# Patient Record
Sex: FEMALE | Race: WHITE | NOT HISPANIC OR LATINO | Employment: OTHER | URBAN - METROPOLITAN AREA
[De-identification: names, ages, dates, MRNs, and addresses within clinical notes are randomized per-mention and may not be internally consistent; named-entity substitution may affect disease eponyms.]

---

## 2017-03-05 ENCOUNTER — APPOINTMENT (OUTPATIENT)
Dept: CT IMAGING | Facility: HOSPITAL | Age: 77
End: 2017-03-05

## 2017-03-05 ENCOUNTER — HOSPITAL ENCOUNTER (EMERGENCY)
Facility: HOSPITAL | Age: 77
Discharge: HOME | End: 2017-03-06
Attending: EMERGENCY MEDICINE | Admitting: EMERGENCY MEDICINE

## 2017-03-05 ENCOUNTER — APPOINTMENT (OUTPATIENT)
Dept: GENERAL RADIOLOGY | Facility: HOSPITAL | Age: 77
End: 2017-03-05

## 2017-03-05 DIAGNOSIS — R41.82 ALTERED MENTAL STATUS, UNSPECIFIED: Primary | ICD-10-CM

## 2017-03-05 DIAGNOSIS — R53.83 FATIGUE, UNSPECIFIED TYPE: ICD-10-CM

## 2017-03-05 LAB
ALT SERPL W P-5'-P-CCNC: 41 U/L (ref 7–40)
APTT PPP: <24 SECONDS (ref 24–31)
AST SERPL-CCNC: 39 U/L (ref 0–33)
BASOPHILS # BLD AUTO: 0.03 10*3/MM3 (ref 0–0.2)
BASOPHILS NFR BLD AUTO: 0.5 % (ref 0–1)
BUN BLDA-MCNC: 21 MG/DL (ref 8–26)
CA-I BLDA-SCNC: 1.33 MMOL/L (ref 1.2–1.32)
CHLORIDE BLDA-SCNC: 103 MMOL/L (ref 98–109)
CO2 BLDA-SCNC: 28 MMOL/L (ref 24–29)
CREAT BLDA-MCNC: 0.9 MG/DL (ref 0.6–1.3)
D-LACTATE SERPL-SCNC: 0.6 MMOL/L (ref 0.5–2)
DEPRECATED RDW RBC AUTO: 46.5 FL (ref 37–54)
EOSINOPHIL # BLD AUTO: 0.11 10*3/MM3 (ref 0.1–0.3)
EOSINOPHIL NFR BLD AUTO: 1.7 % (ref 0–3)
ERYTHROCYTE [DISTWIDTH] IN BLOOD BY AUTOMATED COUNT: 13.8 % (ref 11.3–14.5)
GLUCOSE BLDC GLUCOMTR-MCNC: 107 MG/DL (ref 70–130)
HCT VFR BLD AUTO: 38.4 % (ref 34.5–44)
HCT VFR BLDA CALC: 38 % (ref 38–51)
HGB BLD-MCNC: 12.6 G/DL (ref 11.5–15.5)
HGB BLDA-MCNC: 12.9 G/DL (ref 12–17)
IMM GRANULOCYTES # BLD: 0.03 10*3/MM3 (ref 0–0.03)
IMM GRANULOCYTES NFR BLD: 0.5 % (ref 0–0.6)
INR PPP: 1 (ref 0.8–1.2)
LYMPHOCYTES # BLD AUTO: 1.87 10*3/MM3 (ref 0.6–4.8)
LYMPHOCYTES NFR BLD AUTO: 28.8 % (ref 24–44)
MCH RBC QN AUTO: 30.2 PG (ref 27–31)
MCHC RBC AUTO-ENTMCNC: 32.8 G/DL (ref 32–36)
MCV RBC AUTO: 92.1 FL (ref 80–99)
MONOCYTES # BLD AUTO: 0.38 10*3/MM3 (ref 0–1)
MONOCYTES NFR BLD AUTO: 5.9 % (ref 0–12)
NEUTROPHILS # BLD AUTO: 4.07 10*3/MM3 (ref 1.5–8.3)
NEUTROPHILS NFR BLD AUTO: 62.6 % (ref 41–71)
PLATELET # BLD AUTO: 56 10*3/MM3 (ref 150–450)
PMV BLD AUTO: 12.6 FL (ref 6–12)
POTASSIUM BLDA-SCNC: 3.9 MMOL/L (ref 3.5–4.9)
PROTHROMBIN TIME: 12.2 SECONDS (ref 12.8–15.2)
RBC # BLD AUTO: 4.17 10*6/MM3 (ref 3.89–5.14)
SODIUM BLDA-SCNC: 141 MMOL/L (ref 138–146)
TROPONIN I SERPL-MCNC: 0.01 NG/ML (ref 0–0.07)
WBC NRBC COR # BLD: 6.49 10*3/MM3 (ref 3.5–10.8)

## 2017-03-05 PROCEDURE — 70450 CT HEAD/BRAIN W/O DYE: CPT

## 2017-03-05 PROCEDURE — 83605 ASSAY OF LACTIC ACID: CPT | Performed by: EMERGENCY MEDICINE

## 2017-03-05 PROCEDURE — 85610 PROTHROMBIN TIME: CPT

## 2017-03-05 PROCEDURE — 99285 EMERGENCY DEPT VISIT HI MDM: CPT

## 2017-03-05 PROCEDURE — 84450 TRANSFERASE (AST) (SGOT): CPT | Performed by: EMERGENCY MEDICINE

## 2017-03-05 PROCEDURE — 71010 HC CHEST PA OR AP: CPT

## 2017-03-05 PROCEDURE — 0042T HC CT CEREBRAL PERFUSION W/WO CONTRAST: CPT

## 2017-03-05 PROCEDURE — 85014 HEMATOCRIT: CPT

## 2017-03-05 PROCEDURE — 80047 BASIC METABLC PNL IONIZED CA: CPT

## 2017-03-05 PROCEDURE — 85025 COMPLETE CBC W/AUTO DIFF WBC: CPT | Performed by: EMERGENCY MEDICINE

## 2017-03-05 PROCEDURE — 93005 ELECTROCARDIOGRAM TRACING: CPT | Performed by: EMERGENCY MEDICINE

## 2017-03-05 PROCEDURE — 85730 THROMBOPLASTIN TIME PARTIAL: CPT | Performed by: EMERGENCY MEDICINE

## 2017-03-05 PROCEDURE — 0 IOPAMIDOL PER 1 ML: Performed by: EMERGENCY MEDICINE

## 2017-03-05 PROCEDURE — 87040 BLOOD CULTURE FOR BACTERIA: CPT | Performed by: EMERGENCY MEDICINE

## 2017-03-05 PROCEDURE — 84460 ALANINE AMINO (ALT) (SGPT): CPT | Performed by: EMERGENCY MEDICINE

## 2017-03-05 PROCEDURE — 84484 ASSAY OF TROPONIN QUANT: CPT

## 2017-03-05 RX ORDER — SODIUM CHLORIDE 0.9 % (FLUSH) 0.9 %
10 SYRINGE (ML) INJECTION AS NEEDED
Status: DISCONTINUED | OUTPATIENT
Start: 2017-03-05 | End: 2017-03-06 | Stop reason: HOSPADM

## 2017-03-05 RX ADMIN — IOPAMIDOL 40 ML: 755 INJECTION, SOLUTION INTRAVENOUS at 22:26

## 2017-03-06 ENCOUNTER — APPOINTMENT (OUTPATIENT)
Dept: GENERAL RADIOLOGY | Facility: HOSPITAL | Age: 77
End: 2017-03-06

## 2017-03-06 ENCOUNTER — HOSPITAL ENCOUNTER (OUTPATIENT)
Facility: HOSPITAL | Age: 77
Setting detail: OBSERVATION
LOS: 1 days | Discharge: NURSING, REHAB OR LONG-TERM CARE | End: 2017-03-14
Attending: EMERGENCY MEDICINE | Admitting: INTERNAL MEDICINE

## 2017-03-06 ENCOUNTER — APPOINTMENT (OUTPATIENT)
Dept: MRI IMAGING | Facility: HOSPITAL | Age: 77
End: 2017-03-06

## 2017-03-06 VITALS
WEIGHT: 130 LBS | OXYGEN SATURATION: 95 % | DIASTOLIC BLOOD PRESSURE: 67 MMHG | TEMPERATURE: 98.5 F | RESPIRATION RATE: 16 BRPM | HEART RATE: 72 BPM | SYSTOLIC BLOOD PRESSURE: 150 MMHG | BODY MASS INDEX: 28.05 KG/M2 | HEIGHT: 57 IN

## 2017-03-06 DIAGNOSIS — Z78.9 IMPAIRED MOBILITY AND ADLS: ICD-10-CM

## 2017-03-06 DIAGNOSIS — R53.1 WEAKNESS: ICD-10-CM

## 2017-03-06 DIAGNOSIS — R62.7 FTT (FAILURE TO THRIVE) IN ADULT: ICD-10-CM

## 2017-03-06 DIAGNOSIS — R41.82 ALTERED MENTAL STATUS, UNSPECIFIED: Primary | ICD-10-CM

## 2017-03-06 DIAGNOSIS — Z74.09 IMPAIRED MOBILITY AND ADLS: ICD-10-CM

## 2017-03-06 DIAGNOSIS — Z74.09 IMPAIRED FUNCTIONAL MOBILITY, BALANCE, GAIT, AND ENDURANCE: ICD-10-CM

## 2017-03-06 LAB
ALBUMIN SERPL-MCNC: 4.4 G/DL (ref 3.2–4.8)
ALBUMIN/GLOB SERPL: 1.7 G/DL (ref 1.5–2.5)
ALP SERPL-CCNC: 76 U/L (ref 25–100)
ALT SERPL W P-5'-P-CCNC: 39 U/L (ref 7–40)
ANION GAP SERPL CALCULATED.3IONS-SCNC: 7 MMOL/L (ref 3–11)
AST SERPL-CCNC: 29 U/L (ref 0–33)
BACTERIA UR QL AUTO: ABNORMAL /HPF
BASOPHILS # BLD AUTO: 0.02 10*3/MM3 (ref 0–0.2)
BASOPHILS NFR BLD AUTO: 0.3 % (ref 0–1)
BILIRUB SERPL-MCNC: 0.2 MG/DL (ref 0.3–1.2)
BILIRUB UR QL STRIP: NEGATIVE
BUN BLD-MCNC: 16 MG/DL (ref 9–23)
BUN/CREAT SERPL: 22.9 (ref 7–25)
CALCIUM SPEC-SCNC: 10.9 MG/DL (ref 8.7–10.4)
CHLORIDE SERPL-SCNC: 104 MMOL/L (ref 99–109)
CLARITY UR: CLEAR
CO2 SERPL-SCNC: 28 MMOL/L (ref 20–31)
COLOR UR: YELLOW
CREAT BLD-MCNC: 0.7 MG/DL (ref 0.6–1.3)
DEPRECATED RDW RBC AUTO: 45.8 FL (ref 37–54)
EOSINOPHIL # BLD AUTO: 0.13 10*3/MM3 (ref 0.1–0.3)
EOSINOPHIL NFR BLD AUTO: 2.2 % (ref 0–3)
ERYTHROCYTE [DISTWIDTH] IN BLOOD BY AUTOMATED COUNT: 13.8 % (ref 11.3–14.5)
FLUAV AG NPH QL: NEGATIVE
FLUBV AG NPH QL IA: NEGATIVE
FOLATE SERPL-MCNC: 14.91 NG/ML (ref 3.2–20)
GFR SERPL CREATININE-BSD FRML MDRD: 81 ML/MIN/1.73
GLOBULIN UR ELPH-MCNC: 2.6 GM/DL
GLUCOSE BLD-MCNC: 100 MG/DL (ref 70–100)
GLUCOSE BLDC GLUCOMTR-MCNC: 106 MG/DL (ref 70–130)
GLUCOSE UR STRIP-MCNC: NEGATIVE MG/DL
HBA1C MFR BLD: 6.3 % (ref 4.8–5.6)
HCT VFR BLD AUTO: 36.6 % (ref 34.5–44)
HGB BLD-MCNC: 11.8 G/DL (ref 11.5–15.5)
HGB UR QL STRIP.AUTO: NEGATIVE
HOLD SPECIMEN: NORMAL
HYALINE CASTS UR QL AUTO: ABNORMAL /LPF
IMM GRANULOCYTES # BLD: 0.01 10*3/MM3 (ref 0–0.03)
IMM GRANULOCYTES NFR BLD: 0.2 % (ref 0–0.6)
KETONES UR QL STRIP: NEGATIVE
LEUKOCYTE ESTERASE UR QL STRIP.AUTO: NEGATIVE
LYMPHOCYTES # BLD AUTO: 1.81 10*3/MM3 (ref 0.6–4.8)
LYMPHOCYTES NFR BLD AUTO: 30.9 % (ref 24–44)
MAGNESIUM SERPL-MCNC: 2.1 MG/DL (ref 1.3–2.7)
MCH RBC QN AUTO: 29.4 PG (ref 27–31)
MCHC RBC AUTO-ENTMCNC: 32.2 G/DL (ref 32–36)
MCV RBC AUTO: 91.3 FL (ref 80–99)
MONOCYTES # BLD AUTO: 0.46 10*3/MM3 (ref 0–1)
MONOCYTES NFR BLD AUTO: 7.8 % (ref 0–12)
NEUTROPHILS # BLD AUTO: 3.43 10*3/MM3 (ref 1.5–8.3)
NEUTROPHILS NFR BLD AUTO: 58.6 % (ref 41–71)
NITRITE UR QL STRIP: NEGATIVE
PH UR STRIP.AUTO: 6 [PH] (ref 5–8)
PLATELET # BLD AUTO: 205 10*3/MM3 (ref 150–450)
PMV BLD AUTO: 11.5 FL (ref 6–12)
POTASSIUM BLD-SCNC: 3.8 MMOL/L (ref 3.5–5.5)
PROT SERPL-MCNC: 7 G/DL (ref 5.7–8.2)
PROT UR QL STRIP: ABNORMAL
RBC # BLD AUTO: 4.01 10*6/MM3 (ref 3.89–5.14)
RBC # UR: ABNORMAL /HPF
REF LAB TEST METHOD: ABNORMAL
SODIUM BLD-SCNC: 139 MMOL/L (ref 132–146)
SP GR UR STRIP: >=1.03 (ref 1–1.03)
SQUAMOUS #/AREA URNS HPF: ABNORMAL /HPF
TROPONIN I SERPL-MCNC: 0 NG/ML (ref 0–0.07)
TROPONIN I SERPL-MCNC: 0 NG/ML (ref 0–0.07)
TSH SERPL DL<=0.05 MIU/L-ACNC: 2.02 MIU/ML (ref 0.35–5.35)
UROBILINOGEN UR QL STRIP: ABNORMAL
VIT B12 BLD-MCNC: 585 PG/ML (ref 211–911)
WBC NRBC COR # BLD: 5.86 10*3/MM3 (ref 3.5–10.8)
WBC UR QL AUTO: ABNORMAL /HPF
WHOLE BLOOD HOLD SPECIMEN: NORMAL

## 2017-03-06 PROCEDURE — 81001 URINALYSIS AUTO W/SCOPE: CPT | Performed by: EMERGENCY MEDICINE

## 2017-03-06 PROCEDURE — 70553 MRI BRAIN STEM W/O & W/DYE: CPT

## 2017-03-06 PROCEDURE — 25010000002 HYDRALAZINE PER 20 MG: Performed by: NURSE PRACTITIONER

## 2017-03-06 PROCEDURE — 93005 ELECTROCARDIOGRAM TRACING: CPT | Performed by: EMERGENCY MEDICINE

## 2017-03-06 PROCEDURE — A9577 INJ MULTIHANCE: HCPCS | Performed by: EMERGENCY MEDICINE

## 2017-03-06 PROCEDURE — 87804 INFLUENZA ASSAY W/OPTIC: CPT | Performed by: EMERGENCY MEDICINE

## 2017-03-06 PROCEDURE — 82962 GLUCOSE BLOOD TEST: CPT

## 2017-03-06 PROCEDURE — 96376 TX/PRO/DX INJ SAME DRUG ADON: CPT

## 2017-03-06 PROCEDURE — 83036 HEMOGLOBIN GLYCOSYLATED A1C: CPT | Performed by: NURSE PRACTITIONER

## 2017-03-06 PROCEDURE — 99220 PR INITIAL OBSERVATION CARE/DAY 70 MINUTES: CPT | Performed by: INTERNAL MEDICINE

## 2017-03-06 PROCEDURE — 83735 ASSAY OF MAGNESIUM: CPT | Performed by: EMERGENCY MEDICINE

## 2017-03-06 PROCEDURE — 96374 THER/PROPH/DIAG INJ IV PUSH: CPT

## 2017-03-06 PROCEDURE — 96375 TX/PRO/DX INJ NEW DRUG ADDON: CPT

## 2017-03-06 PROCEDURE — 80053 COMPREHEN METABOLIC PANEL: CPT | Performed by: EMERGENCY MEDICINE

## 2017-03-06 PROCEDURE — 86592 SYPHILIS TEST NON-TREP QUAL: CPT | Performed by: INTERNAL MEDICINE

## 2017-03-06 PROCEDURE — G0378 HOSPITAL OBSERVATION PER HR: HCPCS

## 2017-03-06 PROCEDURE — 51702 INSERT TEMP BLADDER CATH: CPT

## 2017-03-06 PROCEDURE — 99285 EMERGENCY DEPT VISIT HI MDM: CPT

## 2017-03-06 PROCEDURE — 25010000002 HALOPERIDOL LACTATE PER 5 MG

## 2017-03-06 PROCEDURE — 82746 ASSAY OF FOLIC ACID SERUM: CPT | Performed by: INTERNAL MEDICINE

## 2017-03-06 PROCEDURE — 85025 COMPLETE CBC W/AUTO DIFF WBC: CPT | Performed by: EMERGENCY MEDICINE

## 2017-03-06 PROCEDURE — 0 GADOBENATE DIMEGLUMINE 529 MG/ML SOLUTION: Performed by: EMERGENCY MEDICINE

## 2017-03-06 PROCEDURE — 84443 ASSAY THYROID STIM HORMONE: CPT | Performed by: NURSE PRACTITIONER

## 2017-03-06 PROCEDURE — 82607 VITAMIN B-12: CPT | Performed by: INTERNAL MEDICINE

## 2017-03-06 PROCEDURE — 84484 ASSAY OF TROPONIN QUANT: CPT

## 2017-03-06 PROCEDURE — 93005 ELECTROCARDIOGRAM TRACING: CPT

## 2017-03-06 PROCEDURE — 25010000002 HALOPERIDOL LACTATE PER 5 MG: Performed by: HOSPITALIST

## 2017-03-06 PROCEDURE — 25010000002 HALOPERIDOL LACTATE PER 5 MG: Performed by: INTERNAL MEDICINE

## 2017-03-06 PROCEDURE — 25010000002 LORAZEPAM PER 2 MG: Performed by: INTERNAL MEDICINE

## 2017-03-06 PROCEDURE — 71010 HC CHEST PA OR AP: CPT

## 2017-03-06 RX ORDER — DICYCLOMINE HCL 20 MG
20 TABLET ORAL EVERY 6 HOURS
COMMUNITY
End: 2017-03-14 | Stop reason: HOSPADM

## 2017-03-06 RX ORDER — FERROUS SULFATE 325(65) MG
325 TABLET ORAL 2 TIMES DAILY
COMMUNITY

## 2017-03-06 RX ORDER — ACETAMINOPHEN 325 MG/1
650 TABLET ORAL EVERY 4 HOURS PRN
Status: DISCONTINUED | OUTPATIENT
Start: 2017-03-06 | End: 2017-03-14 | Stop reason: HOSPADM

## 2017-03-06 RX ORDER — HYDRALAZINE HYDROCHLORIDE 20 MG/ML
10 INJECTION INTRAMUSCULAR; INTRAVENOUS EVERY 6 HOURS PRN
Status: DISCONTINUED | OUTPATIENT
Start: 2017-03-06 | End: 2017-03-14 | Stop reason: HOSPADM

## 2017-03-06 RX ORDER — DILTIAZEM HYDROCHLORIDE 240 MG/1
240 CAPSULE, COATED, EXTENDED RELEASE ORAL NIGHTLY
Status: DISCONTINUED | OUTPATIENT
Start: 2017-03-06 | End: 2017-03-14 | Stop reason: HOSPADM

## 2017-03-06 RX ORDER — MIRTAZAPINE 15 MG/1
15 TABLET, FILM COATED ORAL NIGHTLY
COMMUNITY
End: 2017-03-14 | Stop reason: HOSPADM

## 2017-03-06 RX ORDER — POTASSIUM CHLORIDE 7.45 MG/ML
10 INJECTION INTRAVENOUS
Status: DISCONTINUED | OUTPATIENT
Start: 2017-03-06 | End: 2017-03-14 | Stop reason: HOSPADM

## 2017-03-06 RX ORDER — HALOPERIDOL 5 MG/ML
2 INJECTION INTRAMUSCULAR ONCE
Status: COMPLETED | OUTPATIENT
Start: 2017-03-06 | End: 2017-03-06

## 2017-03-06 RX ORDER — ONDANSETRON 2 MG/ML
4 INJECTION INTRAMUSCULAR; INTRAVENOUS EVERY 6 HOURS PRN
Status: DISCONTINUED | OUTPATIENT
Start: 2017-03-06 | End: 2017-03-14 | Stop reason: HOSPADM

## 2017-03-06 RX ORDER — LORAZEPAM 2 MG/ML
0.5 INJECTION INTRAMUSCULAR EVERY 6 HOURS PRN
Status: DISCONTINUED | OUTPATIENT
Start: 2017-03-06 | End: 2017-03-14 | Stop reason: HOSPADM

## 2017-03-06 RX ORDER — ATORVASTATIN CALCIUM 40 MG/1
80 TABLET, FILM COATED ORAL NIGHTLY
Status: DISCONTINUED | OUTPATIENT
Start: 2017-03-06 | End: 2017-03-14 | Stop reason: HOSPADM

## 2017-03-06 RX ORDER — TRAMADOL HYDROCHLORIDE 50 MG/1
50 TABLET ORAL 2 TIMES DAILY
COMMUNITY
End: 2017-03-14 | Stop reason: HOSPADM

## 2017-03-06 RX ORDER — SENNA AND DOCUSATE SODIUM 50; 8.6 MG/1; MG/1
2 TABLET, FILM COATED ORAL 2 TIMES DAILY PRN
Status: DISCONTINUED | OUTPATIENT
Start: 2017-03-06 | End: 2017-03-14 | Stop reason: HOSPADM

## 2017-03-06 RX ORDER — FUROSEMIDE 20 MG/1
20 TABLET ORAL EVERY OTHER DAY
COMMUNITY
End: 2017-03-14 | Stop reason: HOSPADM

## 2017-03-06 RX ORDER — FLUOXETINE HYDROCHLORIDE 20 MG/1
20 CAPSULE ORAL DAILY
Status: DISCONTINUED | OUTPATIENT
Start: 2017-03-07 | End: 2017-03-14 | Stop reason: HOSPADM

## 2017-03-06 RX ORDER — DOXAZOSIN MESYLATE 1 MG/1
2 TABLET ORAL NIGHTLY
Status: DISCONTINUED | OUTPATIENT
Start: 2017-03-06 | End: 2017-03-14 | Stop reason: HOSPADM

## 2017-03-06 RX ORDER — HALOPERIDOL 5 MG/ML
2 INJECTION INTRAMUSCULAR EVERY 6 HOURS PRN
Status: DISCONTINUED | OUTPATIENT
Start: 2017-03-06 | End: 2017-03-14 | Stop reason: HOSPADM

## 2017-03-06 RX ORDER — SODIUM CHLORIDE 0.9 % (FLUSH) 0.9 %
1-10 SYRINGE (ML) INJECTION AS NEEDED
Status: DISCONTINUED | OUTPATIENT
Start: 2017-03-06 | End: 2017-03-14 | Stop reason: HOSPADM

## 2017-03-06 RX ORDER — ATENOLOL 50 MG/1
50 TABLET ORAL EVERY 12 HOURS SCHEDULED
Status: DISCONTINUED | OUTPATIENT
Start: 2017-03-06 | End: 2017-03-14 | Stop reason: HOSPADM

## 2017-03-06 RX ORDER — CETIRIZINE HYDROCHLORIDE 10 MG/1
10 TABLET ORAL DAILY
COMMUNITY
End: 2017-03-14 | Stop reason: HOSPADM

## 2017-03-06 RX ORDER — ASPIRIN 325 MG
325 TABLET ORAL DAILY
Status: DISCONTINUED | OUTPATIENT
Start: 2017-03-07 | End: 2017-03-14 | Stop reason: HOSPADM

## 2017-03-06 RX ORDER — METHYLPHENIDATE HYDROCHLORIDE 5 MG/1
5 TABLET ORAL DAILY
Status: DISCONTINUED | OUTPATIENT
Start: 2017-03-07 | End: 2017-03-14 | Stop reason: HOSPADM

## 2017-03-06 RX ORDER — PANTOPRAZOLE SODIUM 40 MG/1
40 TABLET, DELAYED RELEASE ORAL DAILY
Status: DISCONTINUED | OUTPATIENT
Start: 2017-03-07 | End: 2017-03-14 | Stop reason: HOSPADM

## 2017-03-06 RX ORDER — CALCIUM CARBONATE 200(500)MG
2 TABLET,CHEWABLE ORAL 2 TIMES DAILY PRN
Status: DISCONTINUED | OUTPATIENT
Start: 2017-03-06 | End: 2017-03-14 | Stop reason: HOSPADM

## 2017-03-06 RX ORDER — ASPIRIN 300 MG/1
300 SUPPOSITORY RECTAL DAILY
Status: DISCONTINUED | OUTPATIENT
Start: 2017-03-07 | End: 2017-03-14 | Stop reason: HOSPADM

## 2017-03-06 RX ORDER — ATORVASTATIN CALCIUM 20 MG/1
20 TABLET, FILM COATED ORAL DAILY
COMMUNITY
End: 2017-03-14 | Stop reason: HOSPADM

## 2017-03-06 RX ORDER — ONDANSETRON 4 MG/1
4 TABLET, FILM COATED ORAL EVERY 6 HOURS PRN
Status: DISCONTINUED | OUTPATIENT
Start: 2017-03-06 | End: 2017-03-14 | Stop reason: HOSPADM

## 2017-03-06 RX ORDER — POTASSIUM CHLORIDE 750 MG/1
40 CAPSULE, EXTENDED RELEASE ORAL AS NEEDED
Status: DISCONTINUED | OUTPATIENT
Start: 2017-03-06 | End: 2017-03-14 | Stop reason: HOSPADM

## 2017-03-06 RX ORDER — CLOPIDOGREL BISULFATE 75 MG/1
75 TABLET ORAL DAILY
Status: DISCONTINUED | OUTPATIENT
Start: 2017-03-06 | End: 2017-03-14 | Stop reason: HOSPADM

## 2017-03-06 RX ORDER — FLUTICASONE PROPIONATE 50 MCG
2 SPRAY, SUSPENSION (ML) NASAL DAILY
Status: ON HOLD | COMMUNITY
End: 2020-04-04

## 2017-03-06 RX ORDER — MULTIVIT WITH MINERALS/LUTEIN
250 TABLET ORAL DAILY
COMMUNITY

## 2017-03-06 RX ORDER — DILTIAZEM HYDROCHLORIDE 300 MG/1
300 CAPSULE, COATED, EXTENDED RELEASE ORAL DAILY
COMMUNITY
End: 2017-03-14 | Stop reason: HOSPADM

## 2017-03-06 RX ORDER — POTASSIUM CHLORIDE 1.5 G/1.77G
40 POWDER, FOR SOLUTION ORAL AS NEEDED
Status: DISCONTINUED | OUTPATIENT
Start: 2017-03-06 | End: 2017-03-14 | Stop reason: HOSPADM

## 2017-03-06 RX ORDER — BISACODYL 10 MG
10 SUPPOSITORY, RECTAL RECTAL DAILY PRN
Status: DISCONTINUED | OUTPATIENT
Start: 2017-03-06 | End: 2017-03-14 | Stop reason: HOSPADM

## 2017-03-06 RX ORDER — DICLOFENAC SODIUM 75 MG/1
75 TABLET, DELAYED RELEASE ORAL 2 TIMES DAILY
COMMUNITY
End: 2017-03-14 | Stop reason: HOSPADM

## 2017-03-06 RX ORDER — SODIUM CHLORIDE 0.9 % (FLUSH) 0.9 %
10 SYRINGE (ML) INJECTION AS NEEDED
Status: DISCONTINUED | OUTPATIENT
Start: 2017-03-06 | End: 2017-03-06

## 2017-03-06 RX ADMIN — LORAZEPAM 0.5 MG: 2 INJECTION INTRAMUSCULAR; INTRAVENOUS at 20:18

## 2017-03-06 RX ADMIN — HYDRALAZINE HYDROCHLORIDE 10 MG: 20 INJECTION INTRAMUSCULAR; INTRAVENOUS at 18:19

## 2017-03-06 RX ADMIN — GADOBENATE DIMEGLUMINE 10 ML: 529 INJECTION, SOLUTION INTRAVENOUS at 01:04

## 2017-03-06 RX ADMIN — HALOPERIDOL LACTATE 2 MG: 5 INJECTION, SOLUTION INTRAMUSCULAR at 20:26

## 2017-03-06 RX ADMIN — HALOPERIDOL LACTATE 2 MG: 5 INJECTION, SOLUTION INTRAMUSCULAR at 21:18

## 2017-03-06 NOTE — ED PROVIDER NOTES
Subjective   Patient is a 77 y.o. female presenting with altered mental status.   History provided by:  Relative  History limited by:  Mental status change  Altered Mental Status   Presenting symptoms: behavior changes, confusion, disorientation and lethargy    Severity:  Severe  Most recent episode:  Today  Episode history:  Continuous  Duration:  2 days  Timing:  Constant  Progression:  Worsening  Chronicity:  New  Context: not alcohol use and not head injury    Context comment:  Was discharged from [facility] approx 2 weeks ago. Daughter states that there she was walking 600ft. daily and was oriented and was preforming ADLs. Reports that this confusion and weakness began 2 days ago .   Associated symptoms: agitation    Associated symptoms: no abdominal pain, no difficulty breathing, no fever and no vomiting      Has been getting home PT/OT since discharge from [facility]. Daughter states that the Physical Therapist commented on the drastic change she was in the strength and mentation of the pt.     Review of Systems   Unable to perform ROS: Mental status change   Constitutional: Negative for fever.   Gastrointestinal: Negative for abdominal pain and vomiting.   Psychiatric/Behavioral: Positive for agitation and confusion.       Past Medical History   Diagnosis Date   • Arthritis    • Carotid stenosis    • Cataracts, bilateral    • Coronary artery disease    • CVA (cerebral vascular accident)    • Hx of migraines    • Hypertension        Allergies   Allergen Reactions   • Hydrocodone Hives   • Oxycodone Hives   • Penicillins Hives   • Sulfa Antibiotics Hives   • Doxycycline    • Lyrica [Pregabalin]        Past Surgical History   Procedure Laterality Date   • Cholecystectomy     • Hysterectomy     • Abdominal surgery         Family History   Problem Relation Age of Onset   • Arthritis Mother    • Early death Mother    • Heart disease Father    • Hypertension Father    • Hyperlipidemia Father     • Diabetes Father    • Hypertension Daughter        Social History     Social History   • Marital status:      Spouse name: N/A   • Number of children: N/A   • Years of education: N/A     Social History Main Topics   • Smoking status: Former Smoker     Packs/day: 1.00     Years: 15.00     Quit date: 9/21/2016   • Smokeless tobacco: Never Used   • Alcohol use No   • Drug use: No   • Sexual activity: No     Other Topics Concern   • None     Social History Narrative           Objective   Physical Exam   Constitutional: She appears well-developed and well-nourished.   HENT:   Head: Normocephalic and atraumatic.   Right Ear: External ear normal.   Left Ear: External ear normal.   Mouth/Throat: Oropharynx is clear and moist.   Eyes: EOM are normal. Pupils are equal, round, and reactive to light.   Neck: Normal range of motion. Neck supple.   Cardiovascular: Normal rate and regular rhythm.    Pulmonary/Chest: Effort normal and breath sounds normal.   Abdominal: Soft. Bowel sounds are normal. She exhibits no distension. There is no tenderness. There is no guarding.   Lymphadenopathy:     She has no cervical adenopathy.   Neurological: She is alert. She is disoriented.   Skin: Skin is warm.   Psychiatric: She has a normal mood and affect. Her speech is normal and behavior is normal.   Vitals reviewed.      Procedures         ED Course  ED Course      Recent Results (from the past 24 hour(s))   POC CHEM 8    Collection Time: 03/05/17 10:23 PM   Result Value Ref Range    Glucose 107 70 - 130 mg/dL    BUN, Arterial 21 8 - 26 mg/dL    Creatinine 0.90 0.60 - 1.30 mg/dL    Sodium, Arterial 141 138 - 146 mmol/L    Potassium, Arterial 3.9 3.5 - 4.9 mmol/L    Chloride, Arterial 103 98 - 109 mmol/L    Total CO2 28 24 - 29 mmol/L    Hemoglobin 12.9 12.0 - 17.0 g/dL    Hematocrit 38 38 - 51 %    Ionized Calcium, Arterial 1.33 (H) 1.20 - 1.32 mmol/L   POC Protime / INR    Collection Time: 03/05/17 10:31 PM   Result Value Ref  Range    Protime 12.2 (L) 12.8 - 15.2 seconds    INR 1.0 0.8 - 1.2   aPTT    Collection Time: 03/05/17 10:34 PM   Result Value Ref Range    PTT <24.0 (L) 24.0 - 31.0 seconds   AST    Collection Time: 03/05/17 10:34 PM   Result Value Ref Range    AST (SGOT) 39 (H) 0 - 33 U/L   ALT    Collection Time: 03/05/17 10:34 PM   Result Value Ref Range    ALT (SGPT) 41 (H) 7 - 40 U/L   Light Blue Top    Collection Time: 03/05/17 10:34 PM   Result Value Ref Range    Extra Tube hold for add-on    Green Top (Gel)    Collection Time: 03/05/17 10:34 PM   Result Value Ref Range    Extra Tube Hold for add-ons.    Lavender Top    Collection Time: 03/05/17 10:34 PM   Result Value Ref Range    Extra Tube hold for add-on    Gold Top - SST    Collection Time: 03/05/17 10:34 PM   Result Value Ref Range    Extra Tube Hold for add-ons.    CBC Auto Differential    Collection Time: 03/05/17 10:34 PM   Result Value Ref Range    WBC 6.49 3.50 - 10.80 10*3/mm3    RBC 4.17 3.89 - 5.14 10*6/mm3    Hemoglobin 12.6 11.5 - 15.5 g/dL    Hematocrit 38.4 34.5 - 44.0 %    MCV 92.1 80.0 - 99.0 fL    MCH 30.2 27.0 - 31.0 pg    MCHC 32.8 32.0 - 36.0 g/dL    RDW 13.8 11.3 - 14.5 %    RDW-SD 46.5 37.0 - 54.0 fl    MPV 12.6 (H) 6.0 - 12.0 fL    Platelets 56 (L) 150 - 450 10*3/mm3    Neutrophil % 62.6 41.0 - 71.0 %    Lymphocyte % 28.8 24.0 - 44.0 %    Monocyte % 5.9 0.0 - 12.0 %    Eosinophil % 1.7 0.0 - 3.0 %    Basophil % 0.5 0.0 - 1.0 %    Immature Grans % 0.5 0.0 - 0.6 %    Neutrophils, Absolute 4.07 1.50 - 8.30 10*3/mm3    Lymphocytes, Absolute 1.87 0.60 - 4.80 10*3/mm3    Monocytes, Absolute 0.38 0.00 - 1.00 10*3/mm3    Eosinophils, Absolute 0.11 0.10 - 0.30 10*3/mm3    Basophils, Absolute 0.03 0.00 - 0.20 10*3/mm3    Immature Grans, Absolute 0.03 0.00 - 0.03 10*3/mm3   POC Troponin, Rapid    Collection Time: 03/05/17 10:34 PM   Result Value Ref Range    Troponin I 0.01 0.00 - 0.07 ng/mL   Blood Culture    Collection Time: 03/05/17 11:00 PM   Result Value  Ref Range    Blood Culture No growth at less than 24 hours    Blood Culture    Collection Time: 03/05/17 11:00 PM   Result Value Ref Range    Blood Culture No growth at less than 24 hours    Lactic Acid, Plasma    Collection Time: 03/05/17 11:00 PM   Result Value Ref Range    Lactate 0.6 0.5 - 2.0 mmol/L   Influenza Antigen    Collection Time: 03/06/17 12:08 AM   Result Value Ref Range    Influenza A Ag, EIA Negative Negative    Influenza B Ag, EIA Negative Negative   POC Glucose Fingerstick    Collection Time: 03/06/17  1:03 PM   Result Value Ref Range    Glucose 106 70 - 130 mg/dL   Comprehensive Metabolic Panel    Collection Time: 03/06/17  2:05 PM   Result Value Ref Range    Glucose 100 70 - 100 mg/dL    BUN 16 9 - 23 mg/dL    Creatinine 0.70 0.60 - 1.30 mg/dL    Sodium 139 132 - 146 mmol/L    Potassium 3.8 3.5 - 5.5 mmol/L    Chloride 104 99 - 109 mmol/L    CO2 28.0 20.0 - 31.0 mmol/L    Calcium 10.9 (H) 8.7 - 10.4 mg/dL    Total Protein 7.0 5.7 - 8.2 g/dL    Albumin 4.40 3.20 - 4.80 g/dL    ALT (SGPT) 39 7 - 40 U/L    AST (SGOT) 29 0 - 33 U/L    Alkaline Phosphatase 76 25 - 100 U/L    Total Bilirubin 0.2 (L) 0.3 - 1.2 mg/dL    eGFR Non African Amer 81 >60 mL/min/1.73    Globulin 2.6 gm/dL    A/G Ratio 1.7 1.5 - 2.5 g/dL    BUN/Creatinine Ratio 22.9 7.0 - 25.0    Anion Gap 7.0 3.0 - 11.0 mmol/L   Magnesium    Collection Time: 03/06/17  2:05 PM   Result Value Ref Range    Magnesium 2.1 1.3 - 2.7 mg/dL   CBC Auto Differential    Collection Time: 03/06/17  2:05 PM   Result Value Ref Range    WBC 5.86 3.50 - 10.80 10*3/mm3    RBC 4.01 3.89 - 5.14 10*6/mm3    Hemoglobin 11.8 11.5 - 15.5 g/dL    Hematocrit 36.6 34.5 - 44.0 %    MCV 91.3 80.0 - 99.0 fL    MCH 29.4 27.0 - 31.0 pg    MCHC 32.2 32.0 - 36.0 g/dL    RDW 13.8 11.3 - 14.5 %    RDW-SD 45.8 37.0 - 54.0 fl    MPV 11.5 6.0 - 12.0 fL    Platelets 205 150 - 450 10*3/mm3    Neutrophil % 58.6 41.0 - 71.0 %    Lymphocyte % 30.9 24.0 - 44.0 %    Monocyte % 7.8 0.0 -  12.0 %    Eosinophil % 2.2 0.0 - 3.0 %    Basophil % 0.3 0.0 - 1.0 %    Immature Grans % 0.2 0.0 - 0.6 %    Neutrophils, Absolute 3.43 1.50 - 8.30 10*3/mm3    Lymphocytes, Absolute 1.81 0.60 - 4.80 10*3/mm3    Monocytes, Absolute 0.46 0.00 - 1.00 10*3/mm3    Eosinophils, Absolute 0.13 0.10 - 0.30 10*3/mm3    Basophils, Absolute 0.02 0.00 - 0.20 10*3/mm3    Immature Grans, Absolute 0.01 0.00 - 0.03 10*3/mm3   POC Troponin, Rapid    Collection Time: 03/06/17  2:08 PM   Result Value Ref Range    Troponin I 0.00 0.00 - 0.07 ng/mL   Urinalysis With / Culture If Indicated    Collection Time: 03/06/17  2:16 PM   Result Value Ref Range    Color, UA Yellow Yellow, Straw    Appearance, UA Clear Clear    pH, UA 6.0 5.0 - 8.0    Specific Gravity, UA >=1.030 1.001 - 1.030    Glucose, UA Negative Negative    Ketones, UA Negative Negative    Bilirubin, UA Negative Negative    Blood, UA Negative Negative    Protein, UA 30 mg/dL (1+) (A) Negative    Leuk Esterase, UA Negative Negative    Nitrite, UA Negative Negative    Urobilinogen, UA 0.2 E.U./dL 0.2 - 1.0 E.U./dL   Urinalysis, Microscopic Only    Collection Time: 03/06/17  2:16 PM   Result Value Ref Range    RBC, UA 0-2 None Seen, 0-2 /HPF    WBC, UA 0-2 (A) None Seen /HPF    Bacteria, UA None Seen None Seen, Trace /HPF    Squamous Epithelial Cells, UA 0-2 None Seen, 0-2 /HPF    Hyaline Casts, UA 0-6 0 - 6 /LPF    Methodology Automated Microscopy    POC Troponin, Rapid    Collection Time: 03/06/17  4:15 PM   Result Value Ref Range    Troponin I 0.00 0.00 - 0.07 ng/mL     Note: In addition to lab results from this visit, the labs listed above may include labs taken at another facility or during a different encounter within the last 24 hours. Please correlate lab times with ED admission and discharge times for further clarification of the services performed during this visit.    XR Chest 1 View   Preliminary Result   Chronic changes seen within the lung fields with no evidence   of  "acute parenchymal disease.       D:  03/06/2017   E:  03/06/2017                    Vitals:    03/06/17 1239 03/06/17 1330 03/06/17 1400 03/06/17 1430   BP: (!) 200/84 153/65 159/76 173/73   BP Location: Left arm      Patient Position: Sitting      Pulse: 67 69 80 62   Resp: 20      Temp: 97.7 °F (36.5 °C)      TempSrc: Oral      SpO2: 96% 93% 93% 93%   Weight: 130 lb (59 kg)      Height: 57.01\" (144.8 cm)        Medications   sodium chloride 0.9 % flush 10 mL (not administered)     ECG/EMG Results (last 24 hours)     Procedure Component Value Units Date/Time    ECG 12 Lead [[id number]] Collected:  03/06/17 1258     Updated:  03/06/17 1359    Narrative:       Test Reason : Weak/Dizzy/AMS protocol  Blood Pressure : **/** mmHG  Vent. Rate : 064 BPM     Atrial Rate : 064 BPM     P-R Int : 150 ms          QRS Dur : 084 ms      QT Int : 380 ms       P-R-T Axes : 019 -22 178 degrees     QTc Int : 392 ms    Normal sinus rhythm  Left ventricular hypertrophy with repolarization abnormality  Abnormal ECG  When compared with ECG of 05-MAR-2017 22:40,  No significant change was found  Confirmed by GAYLE BAUTISTA MD ([id number]) on 3/6/2017 1:59:41 PM    Referred By:  EDMD           Confirmed By:GAYLE BAUTISTA MD    ECG 12 Lead [[id number]] Collected:  03/06/17 1612     Updated:  03/06/17 1612        Spoke with Dr. Guerra who will admit the pt to telemetry. Daughter agrees with the admission.               MDM    Final diagnoses:   Altered mental status, unspecified   Weakness   FTT (failure to thrive) in adult            Lesly Geller, GABRIELLE  03/06/17 1647       GABRIELLE Post  03/06/17 1703    "

## 2017-03-06 NOTE — ED PROVIDER NOTES
Subjective   HPI Comments: 77 y.o. female presents to the ED w/ c/o difficulty of speech starting about 2 hours PTA. Pt seen here for CVA in September with residual deficits. She was placed in a 100-day rehabilitation program, discharged home 2 weeks ago. Family tells me that, over the last week, they have noticed a gradual an progressive decline, and note specifically that the patient is having difficulty performing the tasks taught to her during rehab. Tonight around 2100 the family noticed that the patient was unable to speak to them, prompting them to call EMS. On EMS evaluation they report possible facial droop and right-sided deficit.    Pt is not on blood thinners.    Patient is a 77 y.o. female presenting with neurologic complaint.   History provided by:  EMS personnel  Neurologic Problem   The patient's primary symptoms include an altered mental status, focal weakness, slurred speech and weakness. This is a new problem. The current episode started today (2100). The problem is unchanged. There was right-sided focality noted. Associated symptoms include confusion. Past treatments include nothing. Her past medical history is significant for a CVA.       Review of Systems   Neurological: Positive for focal weakness, facial asymmetry, speech difficulty and weakness.   Psychiatric/Behavioral: Positive for confusion.   All other systems reviewed and are negative.      Past Medical History   Diagnosis Date   • Arthritis    • Carotid stenosis    • Cataracts, bilateral    • Coronary artery disease    • CVA (cerebral vascular accident)    • Hx of migraines    • Hypertension        Allergies   Allergen Reactions   • Hydrocodone Hives   • Oxycodone Hives   • Penicillins Hives   • Sulfa Antibiotics Hives   • Doxycycline    • Lyrica [Pregabalin]        Past Surgical History   Procedure Laterality Date   • Cholecystectomy     • Hysterectomy     • Abdominal surgery         Family History   Problem Relation Age of Onset   •  Arthritis Mother    • Early death Mother    • Heart disease Father    • Hypertension Father    • Hyperlipidemia Father    • Diabetes Father    • Hypertension Daughter        Social History     Social History   • Marital status:      Spouse name: N/A   • Number of children: N/A   • Years of education: N/A     Social History Main Topics   • Smoking status: Former Smoker     Packs/day: 1.00     Years: 15.00     Quit date: 9/21/2016   • Smokeless tobacco: Never Used   • Alcohol use No   • Drug use: No   • Sexual activity: No     Other Topics Concern   • None     Social History Narrative         Objective   Physical Exam   Constitutional: She is oriented to person, place, and time. She appears well-developed and well-nourished.  Non-toxic appearance. No distress.   HENT:   Head: Normocephalic and atraumatic.   Right Ear: External ear normal.   Left Ear: External ear normal.   Nose: Nose normal.   Eyes: Conjunctivae, EOM and lids are normal. Pupils are equal, round, and reactive to light.   Neck: Normal range of motion. Neck supple. No tracheal deviation present.   Cardiovascular: Normal rate, regular rhythm and normal heart sounds.  Exam reveals no gallop, no friction rub and no decreased pulses.    No murmur heard.  Pulmonary/Chest: Effort normal and breath sounds normal. No respiratory distress. She has no decreased breath sounds. She has no wheezes. She has no rhonchi. She has no rales. She exhibits no tenderness.   Abdominal: Soft. Normal appearance and bowel sounds are normal. There is no tenderness. There is no rebound and no guarding.   Musculoskeletal: Normal range of motion. She exhibits no edema or deformity.   Lymphadenopathy:     She has no cervical adenopathy.   Neurological: She is alert and oriented to person, place, and time. She has normal strength. No cranial nerve deficit or sensory deficit. Coordination normal.   Alert, A&Ox3 on repeat evaluation. Speaking without difficulty. Words seem  well-formed. No dysarthria, or dysphagia, GCS 15.   Skin: Skin is warm and dry. No rash noted. She is not diaphoretic.   Psychiatric: She has a normal mood and affect. Her speech is normal and behavior is normal. Judgment and thought content normal. Cognition and memory are normal.   Nursing note and vitals reviewed.      Procedures         ED Course  ED Course       Recent Results (from the past 24 hour(s))   POC CHEM 8    Collection Time: 03/05/17 10:23 PM   Result Value Ref Range    Glucose 107 70 - 130 mg/dL    BUN, Arterial 21 8 - 26 mg/dL    Creatinine 0.90 0.60 - 1.30 mg/dL    Sodium, Arterial 141 138 - 146 mmol/L    Potassium, Arterial 3.9 3.5 - 4.9 mmol/L    Chloride, Arterial 103 98 - 109 mmol/L    Total CO2 28 24 - 29 mmol/L    Hemoglobin 12.9 12.0 - 17.0 g/dL    Hematocrit 38 38 - 51 %    Ionized Calcium, Arterial 1.33 (H) 1.20 - 1.32 mmol/L   POC Protime / INR    Collection Time: 03/05/17 10:31 PM   Result Value Ref Range    Protime 12.2 (L) 12.8 - 15.2 seconds    INR 1.0 0.8 - 1.2   aPTT    Collection Time: 03/05/17 10:34 PM   Result Value Ref Range    PTT <24.0 (L) 24.0 - 31.0 seconds   AST    Collection Time: 03/05/17 10:34 PM   Result Value Ref Range    AST (SGOT) 39 (H) 0 - 33 U/L   ALT    Collection Time: 03/05/17 10:34 PM   Result Value Ref Range    ALT (SGPT) 41 (H) 7 - 40 U/L   Light Blue Top    Collection Time: 03/05/17 10:34 PM   Result Value Ref Range    Extra Tube hold for add-on    Green Top (Gel)    Collection Time: 03/05/17 10:34 PM   Result Value Ref Range    Extra Tube Hold for add-ons.    Lavender Top    Collection Time: 03/05/17 10:34 PM   Result Value Ref Range    Extra Tube hold for add-on    Gold Top - SST    Collection Time: 03/05/17 10:34 PM   Result Value Ref Range    Extra Tube Hold for add-ons.    CBC Auto Differential    Collection Time: 03/05/17 10:34 PM   Result Value Ref Range    WBC 6.49 3.50 - 10.80 10*3/mm3    RBC 4.17 3.89 - 5.14 10*6/mm3    Hemoglobin 12.6 11.5 -  15.5 g/dL    Hematocrit 38.4 34.5 - 44.0 %    MCV 92.1 80.0 - 99.0 fL    MCH 30.2 27.0 - 31.0 pg    MCHC 32.8 32.0 - 36.0 g/dL    RDW 13.8 11.3 - 14.5 %    RDW-SD 46.5 37.0 - 54.0 fl    MPV 12.6 (H) 6.0 - 12.0 fL    Platelets 56 (L) 150 - 450 10*3/mm3    Neutrophil % 62.6 41.0 - 71.0 %    Lymphocyte % 28.8 24.0 - 44.0 %    Monocyte % 5.9 0.0 - 12.0 %    Eosinophil % 1.7 0.0 - 3.0 %    Basophil % 0.5 0.0 - 1.0 %    Immature Grans % 0.5 0.0 - 0.6 %    Neutrophils, Absolute 4.07 1.50 - 8.30 10*3/mm3    Lymphocytes, Absolute 1.87 0.60 - 4.80 10*3/mm3    Monocytes, Absolute 0.38 0.00 - 1.00 10*3/mm3    Eosinophils, Absolute 0.11 0.10 - 0.30 10*3/mm3    Basophils, Absolute 0.03 0.00 - 0.20 10*3/mm3    Immature Grans, Absolute 0.03 0.00 - 0.03 10*3/mm3   POC Troponin, Rapid    Collection Time: 03/05/17 10:34 PM   Result Value Ref Range    Troponin I 0.01 0.00 - 0.07 ng/mL   Lactic Acid, Plasma    Collection Time: 03/05/17 11:00 PM   Result Value Ref Range    Lactate 0.6 0.5 - 2.0 mmol/L   Influenza Antigen    Collection Time: 03/06/17 12:08 AM   Result Value Ref Range    Influenza A Ag, EIA Negative Negative    Influenza B Ag, EIA Negative Negative     Note: In addition to lab results from this visit, the labs listed above may include labs taken at another facility or during a different encounter within the last 24 hours. Please correlate lab times with ED admission and discharge times for further clarification of the services performed during this visit.    MRI Brain With & Without Contrast   Final Result   Abnormal     Presumed microvascular changes in the supratentorial white matter, with    multiple old deep gray and white matter lacunar infarcts.     Atrophic changes.     No acute infarction, acute hemorrhage, enhancing lesion or extra-axial    collection.            THIS DOCUMENT HAS BEEN ELECTRONICALLY SIGNED BY KALIE CHILDRESS MD      CT Cerebral Perfusion With & Without Contrast   Final Result   Abnormal   No  perfusion evidence of acute ischemia or infarction.      Presumed microvascular changes in the supratentorial white matter, with small    old deep gray lacunar infarcts.      No acute hemorrhage or extra-axial collection.      THIS DOCUMENT HAS BEEN ELECTRONICALLY SIGNED BY KALIE CHILDRESS MD      CT Head Without Contrast Stroke Protocol   Final Result   Abnormal      1. No acute findings.      2. Non-acute findings are described above.         THIS DOCUMENT HAS BEEN ELECTRONICALLY SIGNED BY LG WARD MD      XR Chest 1 View    (Results Pending)     Vitals:    03/05/17 2345 03/06/17 0015 03/06/17 0140 03/06/17 0230   BP: 174/66 175/96  150/67   BP Location:       Patient Position:       Pulse: 79 72     Resp:       Temp:       TempSrc:       SpO2: 93% 96% 97% 95%   Weight:       Height:         Medications   sodium chloride 0.9 % flush 10 mL (not administered)   iopamidol (ISOVUE-370) 76 % injection 50 mL (40 mL Intravenous Given 3/5/17 2226)   gadobenate dimeglumine (MULTIHANCE) injection 10 mL (10 mL Intravenous Given 3/6/17 0104)     ECG/EMG Results (last 24 hours)     ** No results found for the last 24 hours. **                      MDM  Number of Diagnoses or Management Options  Altered mental status, unspecified: new and requires workup  Fatigue, unspecified type: new and requires workup  Diagnosis management comments: All symptoms resolved on re-evaluation at 0000. The family tells me that the patient is speaking better now, but that her speech does seem to be louder and coarser than usual.    CT head and CT perfusion of head showed no acute abnormalities.     Due to sudden onset of loss of speech, that has now resolved, TIA is a concern.     MRI of brain performed and no acute abnormalities identified.     Patient is afebrile with normal WBC count. No source of infection identified for slowly progressing weakness/loss of function over last week. Patient refuses to give urine, and refuses a urinary cath, and  is awake and alert with normal mentation, GCS 15 on repeat evaluation.     Had long discussion with patient and daughter about admission.     Patient refused admission, daughter desire patient to be admitted, but due to patients insistance did agree to take patient home.     Advised patient to return with any further concerns.        Amount and/or Complexity of Data Reviewed  Clinical lab tests: reviewed and ordered  Tests in the radiology section of CPT®: ordered and reviewed  Obtain history from someone other than the patient: yes  Review and summarize past medical records: yes  Independent visualization of images, tracings, or specimens: yes    Risk of Complications, Morbidity, and/or Mortality  Presenting problems: high  Diagnostic procedures: high  Management options: high    Critical Care  Total time providing critical care: 30-74 minutes      Final diagnoses:   Altered mental status, unspecified   Fatigue, unspecified type       Documentation assistance provided by pilar Hull.  Information recorded by the pilar was done at my direction and has been verified and validated by me.     Edu Hull  03/05/17 2220       Edu Hull  03/06/17 0012       Keon Red MD  03/06/17 [id number]

## 2017-03-06 NOTE — ED NOTES
Pt in rm with family member yelling out wants to go home. Pt stated wants to go home and pulling off bp cuff. Tried calming Pt down but Pt just kept stating wants to go home. Family member at bedside. Dr. Red alerted to what was going on.      Ana Ozuna  03/06/17 4928

## 2017-03-06 NOTE — H&P
[facility] Medicine Services  HISTORY AND PHYSICAL    Primary Care Physician: No Known Provider  Subjective   Chief Complaint:  confusion    History of Present Illness:   Ms Walker is a 77-year-old female who presented to [facility] ED on 3/6/17 with altered mental status and increased weakness that started 2 days ago according to the patient's daughter.  Patient was discharged from [facility] approximately 2 weeks ago and was walking 600 feet a day and was oriented and performing her own ADLs.  Patient daughter states she has no other complaints at this time.  Patient is agitated, but can be caused easily.  All labs are negative, x-rays are within normal limits, but patient will be admitted to the hospitalist service for further evaluation and treatment.    Review of Systems   Unable to assess due to mental status change    Past Medical History   Diagnosis Date   • Arthritis    • Carotid stenosis    • Cataracts, bilateral    • Coronary artery disease    • CVA (cerebral vascular accident)    • Hx of migraines    • Hypertension      Past Surgical History   Procedure Laterality Date   • Cholecystectomy     • Hysterectomy     • Abdominal surgery       Family History   Problem Relation Age of Onset   • Arthritis Mother    • Early death Mother    • Heart disease Father    • Hypertension Father    • Hyperlipidemia Father    • Diabetes Father    • Hypertension Daughter      Social History     Social History   • Marital status:      Spouse name: N/A   • Number of children: N/A   • Years of education: N/A     Occupational History   • Not on file.     Social History Main Topics   • Smoking status: Former Smoker     Packs/day: 1.00     Years: 15.00     Quit date: 9/21/2016   • Smokeless tobacco: Never Used   • Alcohol use No   • Drug use: No   • Sexual activity: No     Other Topics Concern   • Not on file     Social History Narrative     Medications:  Current Facility-Administered  "Medications on File Prior to Encounter   Medication   • [COMPLETED] gadobenate dimeglumine (MULTIHANCE) injection 10 mL   • [COMPLETED] iopamidol (ISOVUE-370) 76 % injection 50 mL   • [DISCONTINUED] sodium chloride 0.9 % flush 10 mL     Current Outpatient Prescriptions on File Prior to Encounter   Medication Sig   • ALPRAZolam (XANAX) 0.5 MG tablet Take 0.25 mg by mouth 2 (Two) Times a Day As Needed for anxiety.   • atenolol (TENORMIN) 50 MG tablet Take 50 mg by mouth 2 (Two) Times a Day.   • clopidogrel (PLAVIX) 75 MG tablet Take 75 mg by mouth Daily.   • doxazosin (CARDURA) 2 MG tablet Take 2 mg by mouth Every Night.   • magnesium oxide (MAGOX) 400 (241.3 MG) MG tablet tablet Take 400 mg by mouth 2 (Two) Times a Day.   • pantoprazole (PROTONIX) 40 MG EC tablet Take 40 mg by mouth Daily.   • potassium chloride (K-DUR) 10 MEQ CR tablet Take 10 mEq by mouth Daily.   • [DISCONTINUED] atorvastatin (LIPITOR) 80 MG tablet Take 1 tablet by mouth Every Night.   • [DISCONTINUED] diltiazem CD (CARDIZEM CD) 240 MG 24 hr capsule Take 240 mg by mouth Daily.   • [DISCONTINUED] FLUoxetine (PROzac) 20 MG capsule Take 20 mg by mouth Daily.   • [DISCONTINUED] methylphenidate (RITALIN) 5 MG tablet Take 5 mg by mouth Daily.     Allergies:  Allergies   Allergen Reactions   • Hydrocodone Hives   • Oxycodone Hives   • Penicillins Hives   • Sulfa Antibiotics Hives   • Doxycycline    • Lyrica [Pregabalin]      Objective   Physical Exam:  Vital Signs:   Visit Vitals   • /73   • Pulse 62   • Temp 97.7 °F (36.5 °C) (Oral)   • Resp 20   • Ht 57.01\" (144.8 cm)   • Wt 130 lb (59 kg)   • LMP  (LMP Unknown)   • SpO2 93%   • BMI 28.12 kg/m2     Physical Exam   Constitutional: She appears well-developed and well-nourished. No distress.   HENT:   Head: Normocephalic and atraumatic.   Mouth/Throat: Oropharynx is clear and moist.   Eyes: Conjunctivae and EOM are normal. Pupils are equal, round, and reactive to light. No scleral icterus.   Neck: " Normal range of motion. Neck supple. No JVD present. No tracheal deviation present.   Cardiovascular: Normal rate, regular rhythm, normal heart sounds and intact distal pulses.  Exam reveals no gallop and no friction rub.    No murmur heard.  Pulmonary/Chest: Effort normal and breath sounds normal. No stridor. No respiratory distress. She has no wheezes. She has no rales. She exhibits no tenderness.   Abdominal: Soft. Bowel sounds are normal. She exhibits no distension. There is no tenderness. There is no rebound and no guarding.   Musculoskeletal: Normal range of motion.   No E/C/C   Neurological: She is alert.   Oriented to person, place (hospital) and year.  Speech is clear, follows all commands but slowly, recent remote memory intact. Lower extremities weak but equal bilaterally.  Able to move legs, but cannot lift off of bed.  Left arm weaker than right. Mild left facial droop.     Skin: Skin is warm and dry. No rash noted. She is not diaphoretic. No erythema. There is pallor.   Psychiatric: She has a normal mood and affect. Her behavior is normal. Judgment and thought content normal.   Pleasant and cooperative   Nursing note and vitals reviewed.    Results Reviewed:    Results from last 7 days  Lab Units 03/06/17  1405   WBC 10*3/mm3 5.86   HEMOGLOBIN g/dL 11.8   PLATELETS 10*3/mm3 205       Results from last 7 days  Lab Units 03/06/17  1405   SODIUM mmol/L 139   POTASSIUM mmol/L 3.8   TOTAL CO2 mmol/L 28.0   CREATININE mg/dL 0.70   GLUCOSE mg/dL 100   CALCIUM mg/dL 10.9*     I have personally reviewed and interpreted available lab data, radiology studies and ECG obtained at time of admission.   Assessment / Plan   Problem List:   Principal Problem:    Altered mental status, unspecified  Active Problems:    Hypertension    Carotid stenosis    Cerebrovascular accident (CVA)    Assessment&Plan  -Admit Dr Guerra/Telemetry OBS/Stable  -Neurology consult  -TSH and A1c pending  -NIHSS Qshift  -Neuro checks every 4  hours  -IV hydralazine PRN  -Home meds    DVT prophylaxis:  SHABBIR/SCDs  Code Status:  Full  Admission Status: Patient will be admitted to (LEXOBS or LEXINPT)     Brianda MarcialmarcoGABRIELLE 03/06/17 5:01 PM      Brief Attending Note       I have seen and examined the patient, performing an independent face-to-face diagnostic evaluation with plan of care reviewed and developed with the advanced practice clinician (APC).      Brief Summary Statement/HPI:   77 year old female brought in by EMS for AMS. Patient was seen early this am and found to be negative for CVA, however patient continued to do poorly at home which caused her family to have her brought back in this afternoon. No family at bedside. Patient slightly agitate, but answers some questions. No complaints.    Attending Physical Exam:  Gen-no acute distress, comfortable  HEENT-atraumatic, normocephalic, PERRLA, EOMI, moist mucous membranes present  CV-RRR, S1 S2 normal, no m/r/g  Resp-CTAB, no wheezes or rales; normal respiratory effort  Abd-soft, NT, ND, +BS; no rebound or guarding  Ext-no edema or clubbing  Skin-warm, dry, no rashes or lesions noted  Neuro-Alert and oriented to year. Moves all extremities.  Psych-slightly agitated but pleasant.    CT head personally reviewed and negative for acute disease. Agree with interpretation.    Brief Assessment/Plan :    AMS  --No obvious cause. Wonder if this is hypertensive encephalopathy? Vs progression of some underlying dementia.  --BP control with IV hydralazine.  --TSH, B12, folate, RPR  --Neuro consult in am.    See above for further detailed assessment and plan developed with APC which I have reviewed and/or edited.    I believe this patient meets OBSERVATION status, however if further evaluation or treatment plans warrant, status may change.  Based upon current information, I predict patient's care encounter to be less than or equal to 2 midnights.      Jaimie Guerra II, DO  03/06/17  6:33 PM

## 2017-03-07 PROBLEM — I16.1 HYPERTENSIVE EMERGENCY: Status: ACTIVE | Noted: 2017-03-07

## 2017-03-07 LAB
ANION GAP SERPL CALCULATED.3IONS-SCNC: 10 MMOL/L (ref 3–11)
ARTICHOKE IGE QN: 90 MG/DL (ref 0–130)
BUN BLD-MCNC: 14 MG/DL (ref 9–23)
BUN/CREAT SERPL: 23.3 (ref 7–25)
C DIFF TOX GENS STL QL NAA+PROBE: NOT DETECTED
CALCIUM SPEC-SCNC: 10.7 MG/DL (ref 8.7–10.4)
CHLORIDE SERPL-SCNC: 104 MMOL/L (ref 99–109)
CHOLEST SERPL-MCNC: 164 MG/DL (ref 0–200)
CO2 SERPL-SCNC: 25 MMOL/L (ref 20–31)
CREAT BLD-MCNC: 0.6 MG/DL (ref 0.6–1.3)
DEPRECATED RDW RBC AUTO: 48.3 FL (ref 37–54)
ERYTHROCYTE [DISTWIDTH] IN BLOOD BY AUTOMATED COUNT: 14.2 % (ref 11.3–14.5)
GFR SERPL CREATININE-BSD FRML MDRD: 97 ML/MIN/1.73
GLUCOSE BLD-MCNC: 122 MG/DL (ref 70–100)
GLUCOSE BLDC GLUCOMTR-MCNC: 116 MG/DL (ref 70–130)
GLUCOSE BLDC GLUCOMTR-MCNC: 136 MG/DL (ref 70–130)
HCT VFR BLD AUTO: 38.7 % (ref 34.5–44)
HDLC SERPL-MCNC: 40 MG/DL (ref 40–60)
HGB BLD-MCNC: 12.5 G/DL (ref 11.5–15.5)
MCH RBC QN AUTO: 29.8 PG (ref 27–31)
MCHC RBC AUTO-ENTMCNC: 32.3 G/DL (ref 32–36)
MCV RBC AUTO: 92.4 FL (ref 80–99)
PLATELET # BLD AUTO: 254 10*3/MM3 (ref 150–450)
PMV BLD AUTO: 12.4 FL (ref 6–12)
POTASSIUM BLD-SCNC: 3.6 MMOL/L (ref 3.5–5.5)
POTASSIUM BLD-SCNC: 4.3 MMOL/L (ref 3.5–5.5)
RBC # BLD AUTO: 4.19 10*6/MM3 (ref 3.89–5.14)
SODIUM BLD-SCNC: 139 MMOL/L (ref 132–146)
TRIGL SERPL-MCNC: 150 MG/DL (ref 0–150)
WBC NRBC COR # BLD: 10.23 10*3/MM3 (ref 3.5–10.8)

## 2017-03-07 PROCEDURE — 97530 THERAPEUTIC ACTIVITIES: CPT

## 2017-03-07 PROCEDURE — 80048 BASIC METABOLIC PNL TOTAL CA: CPT | Performed by: NURSE PRACTITIONER

## 2017-03-07 PROCEDURE — G8978 MOBILITY CURRENT STATUS: HCPCS

## 2017-03-07 PROCEDURE — 84132 ASSAY OF SERUM POTASSIUM: CPT | Performed by: FAMILY MEDICINE

## 2017-03-07 PROCEDURE — 25010000002 LORAZEPAM PER 2 MG: Performed by: INTERNAL MEDICINE

## 2017-03-07 PROCEDURE — G8988 SELF CARE GOAL STATUS: HCPCS

## 2017-03-07 PROCEDURE — 82962 GLUCOSE BLOOD TEST: CPT

## 2017-03-07 PROCEDURE — 85027 COMPLETE CBC AUTOMATED: CPT | Performed by: NURSE PRACTITIONER

## 2017-03-07 PROCEDURE — 99225 PR SBSQ OBSERVATION CARE/DAY 25 MINUTES: CPT | Performed by: FAMILY MEDICINE

## 2017-03-07 PROCEDURE — G9163 LANG EXPRESS GOAL STATUS: HCPCS

## 2017-03-07 PROCEDURE — G8987 SELF CARE CURRENT STATUS: HCPCS

## 2017-03-07 PROCEDURE — 25010000002 HALOPERIDOL LACTATE PER 5 MG: Performed by: INTERNAL MEDICINE

## 2017-03-07 PROCEDURE — G8979 MOBILITY GOAL STATUS: HCPCS

## 2017-03-07 PROCEDURE — 25010000002 HALOPERIDOL LACTATE PER 5 MG

## 2017-03-07 PROCEDURE — 97166 OT EVAL MOD COMPLEX 45 MIN: CPT

## 2017-03-07 PROCEDURE — 97162 PT EVAL MOD COMPLEX 30 MIN: CPT

## 2017-03-07 PROCEDURE — G0378 HOSPITAL OBSERVATION PER HR: HCPCS

## 2017-03-07 PROCEDURE — 97110 THERAPEUTIC EXERCISES: CPT

## 2017-03-07 PROCEDURE — 96376 TX/PRO/DX INJ SAME DRUG ADON: CPT

## 2017-03-07 PROCEDURE — 99204 OFFICE O/P NEW MOD 45 MIN: CPT | Performed by: PSYCHIATRY & NEUROLOGY

## 2017-03-07 PROCEDURE — 80061 LIPID PANEL: CPT | Performed by: NURSE PRACTITIONER

## 2017-03-07 PROCEDURE — G9162 LANG EXPRESS CURRENT STATUS: HCPCS

## 2017-03-07 PROCEDURE — 87493 C DIFF AMPLIFIED PROBE: CPT | Performed by: FAMILY MEDICINE

## 2017-03-07 PROCEDURE — 92523 SPEECH SOUND LANG COMPREHEN: CPT

## 2017-03-07 PROCEDURE — 25010000002 HYDRALAZINE PER 20 MG: Performed by: NURSE PRACTITIONER

## 2017-03-07 RX ORDER — RIVASTIGMINE 4.6 MG/24H
1 PATCH, EXTENDED RELEASE TRANSDERMAL DAILY
Status: DISCONTINUED | OUTPATIENT
Start: 2017-03-07 | End: 2017-03-14 | Stop reason: HOSPADM

## 2017-03-07 RX ADMIN — PANTOPRAZOLE SODIUM 40 MG: 40 TABLET, DELAYED RELEASE ORAL at 08:23

## 2017-03-07 RX ADMIN — ATENOLOL 50 MG: 50 TABLET ORAL at 08:22

## 2017-03-07 RX ADMIN — METHYLPHENIDATE HYDROCHLORIDE 5 MG: 5 TABLET ORAL at 08:23

## 2017-03-07 RX ADMIN — LORAZEPAM 0.5 MG: 2 INJECTION INTRAMUSCULAR; INTRAVENOUS at 04:42

## 2017-03-07 RX ADMIN — CLOPIDOGREL BISULFATE 75 MG: 75 TABLET, FILM COATED ORAL at 08:22

## 2017-03-07 RX ADMIN — ASPIRIN 325 MG ORAL TABLET 325 MG: 325 PILL ORAL at 15:54

## 2017-03-07 RX ADMIN — HYDRALAZINE HYDROCHLORIDE 10 MG: 20 INJECTION INTRAMUSCULAR; INTRAVENOUS at 00:26

## 2017-03-07 RX ADMIN — ATORVASTATIN CALCIUM 80 MG: 40 TABLET, FILM COATED ORAL at 20:16

## 2017-03-07 RX ADMIN — FLUOXETINE HYDROCHLORIDE 20 MG: 20 CAPSULE ORAL at 08:23

## 2017-03-07 RX ADMIN — DILTIAZEM HYDROCHLORIDE 240 MG: 240 CAPSULE, COATED, EXTENDED RELEASE ORAL at 20:16

## 2017-03-07 RX ADMIN — Medication 400 MG: at 18:00

## 2017-03-07 RX ADMIN — POTASSIUM CHLORIDE 40 MEQ: 750 CAPSULE, EXTENDED RELEASE ORAL at 08:22

## 2017-03-07 RX ADMIN — DOXAZOSIN MESYLATE 2 MG: 1 TABLET ORAL at 20:16

## 2017-03-07 RX ADMIN — POTASSIUM CHLORIDE 40 MEQ: 750 CAPSULE, EXTENDED RELEASE ORAL at 12:35

## 2017-03-07 RX ADMIN — RIVASTIGMINE TRANSDERMAL SYSTEM 1 PATCH: 4.6 PATCH, EXTENDED RELEASE TRANSDERMAL at 15:54

## 2017-03-07 RX ADMIN — HYDRALAZINE HYDROCHLORIDE 10 MG: 20 INJECTION INTRAMUSCULAR; INTRAVENOUS at 05:03

## 2017-03-07 RX ADMIN — Medication 400 MG: at 08:22

## 2017-03-07 RX ADMIN — Medication 10 ML: at 08:22

## 2017-03-07 RX ADMIN — LORAZEPAM 0.5 MG: 2 INJECTION INTRAMUSCULAR; INTRAVENOUS at 12:44

## 2017-03-07 RX ADMIN — HALOPERIDOL LACTATE 2 MG: 5 INJECTION, SOLUTION INTRAMUSCULAR at 18:06

## 2017-03-07 RX ADMIN — ATENOLOL 50 MG: 50 TABLET ORAL at 20:16

## 2017-03-07 RX ADMIN — HYDRALAZINE HYDROCHLORIDE 10 MG: 20 INJECTION INTRAMUSCULAR; INTRAVENOUS at 12:42

## 2017-03-07 NOTE — PROGRESS NOTES
Continued Stay Note  [facility]     Patient Name: Loni Walker  MRN: [id number]  Today's Date: 3/7/2017    Admit Date: 3/6/2017          Discharge Plan       03/07/17 1505    Case Management/Social Work Plan    Plan Spoke with Mr. Walker's daughter Mitali López and explained the process of applying for Medicaid after Mrs. Walker were accepted to a skilled nursing home and in the nursing home for 30 days.  Social explained the information that is required to be taken to the Medicaid office.    Patient/Family In Agreement With Plan yes      03/07/17 1402    Case Management/Social Work Plan    Plan LTC    Patient/Family In Agreement With Plan yes    Additional Comments Spoke at length with daughter, Maribel (POA) regarding discharge plan.  Pt lives with daughter and was recently discharged from [facility] approximately 16 days ago.  Pt has declined since discharge from [facility], pt was at the skilled facility for 100 days.  Pt is mostly dependent with all ADL's and was currently HH services with [facility].  CM informed daughter that patient is currently here under observation status and due to not having a 3 midnight inpatient qualifying stay, patient could not transfer to a skilled facility.  Daughter wants to look for long term placement and apply patient for Medicaid.  CM explained Medicaid application process to daughter and she verbalized understanding.  CM will begin seeking long term placement in [place] and surrounding [facility], Medicaid pending.  SW consulted to discuss Medicaid options with daughter.  CM will continue to follow for discharge placement needs.        03/07/17 1401    Case Management/Social Work Plan    Plan Social work spoke with Mitali López, [phone]     Patient/Family In Agreement With Plan yes              Discharge Codes     None        Expected Discharge Date and Time     Expected Discharge Date Expected Discharge Time    Mar 9, 2017             RAMONE Guillen

## 2017-03-07 NOTE — PLAN OF CARE
Problem: Patient Care Overview (Adult)  Goal: Plan of Care Review  Outcome: Ongoing (interventions implemented as appropriate)    03/07/17 1344   Coping/Psychosocial Response Interventions   Plan Of Care Reviewed With patient   Outcome Evaluation   Outcome Summary/Follow up Plan Cog/comm eval this am: Patient with expresssive and receptive language difficulties at this time. rec therapy to address          Problem: Inpatient SLP  Goal: Expressive-Patient will improve expressive language skills to allow optimal participation in care  Outcome: Ongoing (interventions implemented as appropriate)    03/07/17 1344   Expressive- Optimal Participation in Care   Expressive Optimal Participation in Care- SLP, Date Established 03/07/17   Expressive Optimal Participation in Care- SLP, Time to Achieve by discharge   Expressive Optimal Participation in Care- SLP, Activity Level Patient will improve word retrieval skills;Patient will improve ability to construct phrase and sentence level responses;Patient will improve connected speech to express thoughts       Goal: Receptive Language-Patient will improve receptive language skills to allow optimal participation in care  Outcome: Ongoing (interventions implemented as appropriate)    03/07/17 1344   Receptive Language- Optimal Participation in Care   Receptive Language Optimal Participation in Care- SLP, Date Established 03/07/17   Receptive Language Optimal Participation in Care- SLP, Time to Achieve by discharge   Receptive Language Optimal Participation in Care- SLP, Activity Level Patient will improve ability to comprehend words/phrases/sentences;Patient will improve ability to follow directions;Patient will improve ability to comprehend questions

## 2017-03-07 NOTE — PLAN OF CARE
Problem: Inpatient Physical Therapy  Goal: Bed Mobility Goal LTG- PT  Outcome: Ongoing (interventions implemented as appropriate)    03/07/17 1008   Bed Mobility PT LTG   Bed Mobility PT LTG, Time to Achieve 2 wks   Bed Mobility PT LTG, Activity Type all bed mobility   Bed Mobility PT LTG, [place] Level minimum assist (75% patient effort)       Goal: Transfer Training Goal 1 LTG- PT  Outcome: Ongoing (interventions implemented as appropriate)    03/07/17 1008   Transfer Training PT LTG   Transfer Training PT LTG, Time to Achieve 2 wks   Transfer Training PT LTG, Activity Type all transfers   Transfer Training PT LTG, [place] Level minimum assist (75% patient effort)   Transfer Training PT LTG, Assist Device walker, rolling       Goal: Gait Training Goal LTG- PT  Outcome: Ongoing (interventions implemented as appropriate)    03/07/17 1008   Gait Training PT LTG   Gait Training Goal PT LTG, Time to Achieve 2 wks   Gait Training Goal PT LTG, [place] Level moderate assist (50% patient effort)   Gait Training Goal PT LTG, Assist Device walker, rolling   Gait Training Goal PT LTG, Distance to Achieve 150

## 2017-03-07 NOTE — PROGRESS NOTES
Acute Care - Occupational Therapy Initial Evaluation  [facility]     Patient Name: Loni Walker  : 1940  MRN: [id number]  Today's Date: 3/7/2017  Onset of Illness/Injury or Date of Surgery Date: 17  Date of Referral to OT: 17  Referring Physician: GABRIELLE FOUNTAIN    Admit Date: 3/6/2017       ICD-10-CM ICD-9-CM   1. Altered mental status, unspecified R41.82 780.97   2. Weakness R53.1 780.79   3. FTT (failure to thrive) in adult R62.7 783.7   4. Impaired mobility and ADLs Z74.09 799.89   5. Impaired functional mobility, balance, gait, and endurance Z74.09 V49.89     Patient Active Problem List   Diagnosis   • Hypertension   • CVA (cerebral vascular accident)   • Carotid stenosis   • Constipation   • Cerebrovascular accident (CVA)   • Altered mental status, unspecified     Past Medical History   Diagnosis Date   • Arthritis    • Carotid stenosis    • Cataracts, bilateral    • Coronary artery disease    • CVA (cerebral vascular accident)    • Hx of migraines    • Hypertension      Past Surgical History   Procedure Laterality Date   • Cholecystectomy     • Hysterectomy     • Abdominal surgery            OT ASSESSMENT FLOWSHEET (last 72 hours)      OT Evaluation       17 0822 17 0821 17 1841          Rehab Evaluation    Document Type evaluation  -CD evaluation  -AN       Subjective Information no complaints;agree to therapy  -CD no complaints;agree to therapy  -AN       Symptoms Noted During/After Treatment --   Noted eyes to be red, watering and glassy- nsg notified.   -CD        General Information    Patient Profile Review yes  -CD yes  -AN       Onset of Illness/Injury or Date of Surgery Date 17  -CD 17  -AN       Referring Physician GABRIELLE FOUNTAIN  -CD GABRIELLE Fountain  -AN       General Observations PT SUPINE UPON ARRIVAL ON RA. NSG IN GIVING MEDS. DTR PRESENT.   -CD Pt supine in bed getting meds from nursing, Daughter present  -AN       Pertinent History Of Current Problem  Pt. admitted with speech deficits, R sided weakness, AMS. Pt home 2 wks from SNF rehab following stroke rehab from September..Daughter reports progressive decline, fall, increase residual R weakness. MRI, CT (-) for acute injury.  -CD Pt. admitted with speech deficits, R sided weakness, AMS. Pt home 2 wks from SNF rehab following stroke rehab from September..Daughter reports progressive decline, fall, increase residual R weakness. MRI, CT (-) for acute injury.  -AN       Precautions/Limitations fall precautions   CONFUSION. NEEDS EXIT ALARM.   -CD fall precautions   exit alarm; confusion  -AN       Prior Level of Function independent:;feeding;min assist:;all household mobility;bed mobility;transfer;gait  -CD independent:;feeding;min assist:;all household mobility;transfer;bed mobility;grooming;dressing;bathing;dependent:;home management;driving   daughter arranges meds  -AN       Equipment Currently Used at Home wheelchair;shower chair;walker, rolling  -CD wheelchair;shower chair  -AN shower chair;wheelchair  -EB      Plans/Goals Discussed With patient and family;agreed upon  -CD patient and family;agreed upon  -AN       Risks Reviewed patient and family:;LOB;dizziness;increased discomfort;change in vital signs  -CD patient and family:;LOB;dizziness;increased discomfort;change in vital signs  -AN       Benefits Reviewed patient and family:;improve function;increase independence;increase strength;increase balance;increase knowledge  -CD improve function;patient and family:;increase independence;increase strength;increase balance  -AN       Barriers to Rehab previous functional deficit;cognitive status;medically complex  -CD cognitive status;previous functional deficit;medically complex  -AN       Living Environment    Lives With child(rolando), adult   DAUGHTER  -CD child(rolando), adult   daugher  -AN child(rolando), adult  -EB      Living Arrangements house  -CD house  -AN house  -EB      Home Accessibility bed and bath on  same level  -CD        Stair Railings at Home none  -CD none  -AN none  -EB      Type of Financial/Environmental Concern   none  -EB      Transportation Available   car  -EB      Living Environment Comment LIVES WITH DTR WHO ASSISTS WITH CARE.   -CD  DTR LIVES WITH PT  -EB      Clinical Impression    Date of Referral to OT  03/06/17  -AN       OT Diagnosis  Decreased ADL I  -AN       Impairments Found (describe specific impairments)  gait, locomotion, and balance;motor function;muscle performance  -AN       Patient/Family Goals Statement  Pt and family agreed to OT; daughter reports she does not know if she can continue to care for pt 24/7 daily  -AN       Criteria for Skilled Therapeutic Interventions Met  yes;treatment indicated  -AN       Rehab Potential  good, to achieve stated therapy goals  -AN       Therapy Frequency  daily  -AN       Anticipated Equipment Needs At Discharge  --   TBD  -AN       Anticipated Discharge Disposition  skilled nursing facility  -AN       Functional Level Prior    Ambulation   3-->assistive equipment and person  -EB      Transferring   3-->assistive equipment and person  -EB      Toileting   4-->completely dependent  -EB      Bathing   2-->assistive person  -EB      Dressing   2-->assistive person  -EB      Eating   2-->assistive person  -EB      Communication   0-->understands/communicates without difficulty  -EB      Swallowing   0-->swallows foods/liquids without difficulty  -EB      Prior Functional Level Comment   16  -EB      Vital Signs    Pre Systolic BP Rehab --   PER O.T.   -  -AN       Pre Treatment Diastolic BP  67  -AN       Intra Systolic BP Rehab  177  -AN       Intra Treatment Diastolic BP  91  -AN       Post Systolic BP Rehab  155  -AN       Post Treatment Diastolic BP  82  -AN       Pretreatment Heart Rate (beats/min)  118  -AN       Intratreatment Heart Rate (beats/min)  120  -AN       Posttreatment Heart Rate (beats/min)  93  -AN       Pre Patient Position   Supine  -AN       Intra Patient Position  Sitting  -AN       Post Patient Position  Sitting  -AN       Pain Assessment    Pain Assessment Pierce-Vieyra FACES  -CD Pierce-Baker FACES  -AN       Pierce-Vieyra FACES Pain Rating 2  -CD 2  -AN       Pain Location Leg  -CD Leg  -AN       Pain Orientation Right  -CD Right  -AN       Vision Assessment/Intervention    Visual Impairment unable/difficult to assess   PT WEARS GLASSES.   -CD unable/difficult to assess   wears glasses  -AN       Cognitive Assessment/Intervention    Current Cognitive/Communication Assessment impaired  -CD impaired  -AN       Orientation Status oriented to;person  -CD oriented to;person  -AN       Follows Commands/Answers Questions 50% of the time;needs cueing;needs increased time;needs repetition  -CD 50% of the time;needs repetition;needs increased time;needs cueing;75% of the time  -AN       Personal Safety moderate impairment;decreased awareness, need for assist;decreased awareness, need for safety;decreased insight to deficits  -CD moderate impairment;decreased insight to deficits  -AN       Personal Safety Interventions fall prevention program maintained;elopement precautions initiated;gait belt;muscle strengthening facilitated;nonskid shoes/slippers when out of bed;supervised activity  -CD fall prevention program maintained  -AN       Short/Long Term Memory moderate impairment, short term memory  -CD moderate impairment, short term memory  -AN       ROM (Range of Motion)    General ROM lower extremity range of motion deficits identified   HYPERTONICITY- REQUIRES AAROM FOR FULL AVAILABLE RANGE.   -CD upper extremity range of motion deficits identified  -AN       General ROM Detail  shoulder flex AROM R to 90,AAROM over 140; L UE and R elbow, wrist, hand WFL; cues to flex R hand  -AN       MMT (Manual Muscle Testing)    General MMT Assessment lower extremity strength deficits identified   UNABLE TO FOLLOW COMMANDS FOR MMT- GROSSLY 2-3/5 R, 3-4/5 L.   -CD        General MMT Assessment Detail  --   Difficult to test with cognitive deficits; R hand shoulder 4  -AN       Muscle Tone Assessment    Muscle Tone Assessment Bilateral Lower Extremities   hypertonic, R >L.   -CD        Bed Mobility, Assessment/Treatment    Bed Mobility, Assistive Device head of bed elevated  -CD head of bed elevated  -AN       Bed Mobility, Scoot/Bridge, [place] moderate assist (50% patient effort);verbal cues required  -CD moderate assist (50% patient effort)  -AN       Bed Mob, Supine to Sit, [place]  moderate assist (50% patient effort);2 person assist required  -AN       Transfer Assessment/Treatment    Transfers, Sit-Stand [place] moderate assist (50% patient effort);2 person assist required;verbal cues required  -CD moderate assist (50% patient effort);2 person assist required;verbal cues required  -AN       Transfers, Stand-Sit [place] moderate assist (50% patient effort);2 person assist required;verbal cues required  -CD moderate assist (50% patient effort);2 person assist required;verbal cues required  -AN       Transfers, Sit-Stand-Sit, Assist Device rolling walker  -CD        Toilet Transfer, [place] moderate assist (50% patient effort);2 person assist required;verbal cues required  -CD moderate assist (50% patient effort);2 person assist required  -AN       Toilet Transfer, Assistive Device bedside commode without drop arms  -CD bedside commode without drop arms  -AN       Transfer, Safety Issues sequencing ability decreased;step length decreased;weight-shifting ability decreased;balance decreased during turns  -CD sequencing ability decreased;balance decreased during turns;step length decreased;weight-shifting ability decreased  -AN       Transfer, Impairments ROM decreased;strength decreased;impaired balance;coordination impaired  -CD ROM decreased;decreased flexibility;strength decreased;impaired balance;coordination impaired  -AN       Functional  Mobility    Functional Mobility- Ind. Level  moderate assist (50% patient effort);2 person assist required  -AN       Functional Mobility-Distance (Feet)  5  -AN       Functional Mobility- Safety Issues  sequencing ability decreased;balance decreased during turns;weight-shifting ability decreased;loses balance backward  -AN       Upper Body Dressing Assessment/Training    UB Dressing Assess/Train, Clothing Type  donning:;hospital gown  -AN       UB Dressing Assess/Train, Position  sitting  -AN       UB Dressing Assess/Train, [place]  moderate assist (50% patient effort)  -AN       UB Dressing Assess/Train, Impairments  ROM decreased;strength decreased;impaired balance  -AN       Lower Body Dressing Assessment/Training    LB Dressing Assess/Train, Clothing Type  donning:;socks  -AN       LB Dressing Assess/Train, Position  sitting  -AN       LB Dressing Assess/Train, [place]  maximum assist (25% patient effort)  -AN       Toileting Assessment/Training    Toileting Assess/Train, Assistive Device  bedside commode  -AN       Toileting Assess/Train, Position  sitting  -AN       Toileting Assess/Train, Comment  max assist hygiene, clothing mgmt  -AN       Grooming Assessment/Training    Grooming Assess/Train, Position  sitting  -AN       Grooming Assess/Train, Indepen Level  verbal cues required;moderate assist (50% patient effort)  -AN       Grooming Assess/Train, Comment  comb hair  -AN       Motor Skills/Interventions    Additional Documentation  Balance Skills Training (Group);Fine Motor Coordination Training (Group);Gross Motor Coordination Training (Group)  -AN       Balance Skills Training    Sitting-Level of Assistance  Close supervision  -AN       Sitting-Balance Support  Left upper extremity supported  -AN       Sitting-Balance Activities  Forward lean;Reaching for objects  -AN       Sitting # of Minutes  8  -AN       Standing-Level of Assistance  Moderate assistance;x2  -AN       Static Standing  Balance Support  Right upper extremity supported;Left upper extremity supported  -AN       Standing-Balance Activities  Weight Shift R-L  -AN       Standing Balance # of Minutes  1  -AN       Gross Motor Coordination Training    Gross Motor Skill, Impairments Detail  impaired  -AN       Fine Motor Coordination Training    Opposition  Right:;Left:;impaired  -AN       Positioning and Restraints    Pre-Treatment Position in bed  -CD in bed  -AN       Post Treatment Position chair  -CD chair  -AN       In Chair reclined;call light within reach;notified nsg;encouraged to call for assist;exit alarm on;with family/caregiver;legs elevated;RUE elevated;LUE elevated  -CD notified nsg;reclined;call light within reach;encouraged to call for assist;exit alarm on;with family/caregiver  -AN         User Key  (r) = Recorded By, (t) = Taken By, (c) = Cosigned By    Initials Name Effective Dates    CD Sheri Aleman, PT 06/19/15 -     KAR Mullen, OT 06/22/15 -     ELÍAS Petersen, LPN 03/14/16 -            Occupational Therapy Education     Title: PT OT SLP Therapies (Done)     Topic: Occupational Therapy (Done)     Point: ADL training (Done)    Description: Instruct learner(s) on proper safety adaptation and remediation techniques during self care or transfers.   Instruct in proper use of assistive devices.    Learning Progress Summary    Learner Readiness Method Response Comment Documented by Status   Patient Acceptance E VU,NR Educated pt and fm on POC, need for assist with ADl's at this time. AN 03/07/17 1018 Done   Family Acceptance E VU,NR Educated pt and fm on POC, need for assist with ADl's at this time. AN 03/07/17 1018 Done               Point: Home exercise program (Done)    Description: Instruct learner(s) on appropriate technique for monitoring, assisting and/or progressing therapeutic exercises/activities.    Learning Progress Summary    Learner Readiness Method Response Comment Documented by Status   Patient  Acceptance E VU,NR Educated pt and fm on POC, need for assist with ADl's at this time. AN 03/07/17 1018 Done   Family Acceptance E VU,NR Educated pt and fm on POC, need for assist with ADl's at this time. AN 03/07/17 1018 Done                      User Key     Initials Effective Dates Name Provider Type Discipline    AN 06/22/15 -  Shauna Mullen, OT Occupational Therapist OT                  OT Recommendation and Plan  Anticipated Equipment Needs At Discharge:  (TBD)  Anticipated Discharge Disposition: skilled nursing facility  Therapy Frequency: daily  Plan of Care Review  Plan Of Care Reviewed With: patient, daughter  Outcome Summary/Follow up Plan: Continue OT to address deficits with functional ROM/coordination, ADL I and balance/txfrs. Recommend SNF at discharge and long tern planning due to daughter's full responsibility for pt.          OT Goals       03/07/17 1019          Bed Mobility OT LTG    Bed Mobility OT LTG, Date Established 03/07/17  -AN      Bed Mobility OT LTG, Time to Achieve 1 wk  -AN      Bed Mobility OT LTG, Activity Type supine to sit/sit to supine  -AN      Bed Mobility OT LTG, [place] Level minimum assist (75% patient effort)  -AN      Transfer Training 2 OT STG    Transfer Training 2 OT STG, Date Established 03/07/17  -AN      Transfer Training 2 OT STG, Time to Achieve 1 wk  -AN      Transfer Training 2 OT STG, Activity Type toilet  -AN      Transfer Training 2 OT STG, [place] Level minimum assist (75% patient effort)  -AN      Toileting OT LTG    Toileting Goal OT LTG, Date Established 03/07/17  -AN      Toileting Goal OT LTG, Time to Achieve 1 wk  -AN      Toileting Goal OT LTG, [place] Level minimum assist (75% patient effort)  -AN      Coordination OT LTG    Coordination OT LTG, Date Established 03/07/17  -AN      Coordination OT LTG, Time to Achieve 1 wk  -AN      Coordination OT LTG, Activity Type FM task;GM task  -AN      Coordination OT LTG, [place] Level  standby assist  -AN        User Key  (r) = Recorded By, (t) = Taken By, (c) = Cosigned By    Initials Name Provider Type    AN Shauna Mullen OT Occupational Therapist                Outcome Measures       03/07/17 0822 03/07/17 0821       How much help from another person do you currently need...    Turning from your back to your side while in flat bed without using bedrails? 2  -CD      Moving from lying on back to sitting on the side of a flat bed without bedrails? 2  -CD      Moving to and from a bed to a chair (including a wheelchair)? 2  -CD      Standing up from a chair using your arms (e.g., wheelchair, bedside chair)? 2  -CD      Climbing 3-5 steps with a railing? 1  -CD      To walk in hospital room? 2  -CD      AM-PAC 6 Clicks Score 11  -CD      How much help from another is currently needed...    Putting on and taking off regular lower body clothing?  2  -AN     Bathing (including washing, rinsing, and drying)  2  -AN     Toileting (which includes using toilet bed pan or urinal)  2  -AN     Putting on and taking off regular upper body clothing  2  -AN     Taking care of personal grooming (such as brushing teeth)  2  -AN     Eating meals  2  -AN     Score  12  -AN     Modified [place] Scale    Modified [place] Scale  4 - Moderately severe disability.  Unable to walk without assistance, and unable to attend to own bodily needs without assistance.  -AN     Functional Assessment    Outcome Measure Options AM-PAC 6 Clicks Basic Mobility (PT)  -CD AM-PAC 6 Clicks Daily Activity (OT);Modified [place]  -AN       User Key  (r) = Recorded By, (t) = Taken By, (c) = Cosigned By    Initials Name Provider Type    MELISSA Aleman, PT Physical Therapist    KAR Mullen OT Occupational Therapist          Time Calculation:   OT Start Time: 0821    Therapy Charges for Today     Code Description Service Date Service Provider Modifiers Qty    [id number]  OT SELFCARE CURRENT 3/7/2017 Shauna Mullen OT GO, CL 1     [id number]  OT SELFCARE PROJECTED 3/7/2017 Shauna Mullen OT GO, CK 1    06170299307 HC OT THER SUPP EA 15 MIN 3/7/2017 Shauna Mullen OT GO 1    [id number] HC OT THERAPEUTIC ACT EA 15 MIN 3/7/2017 Shauna Mullen OT GO 1    [id number]  OT EVAL MOD COMPLEXITY 4 3/7/2017 Shauna Mullen OT GO 1          OT G-codes  OT Functional Scales Options: AM-PAC 6 Clicks Daily Activity (OT)  Score: 12  Functional Limitation: Self care  Self Care Current Status (): At least 60 percent but less than 80 percent impaired, limited or restricted  Self Care Goal Status (): At least 40 percent but less than 60 percent impaired, limited or restricted    hSauna Mullen OT  3/7/2017

## 2017-03-07 NOTE — PROGRESS NOTES
Acute Care - Speech Language Pathology Initial Evaluation  [facility]   Cognitive-Communication Evaluation       Patient Name: Loni Walker  : 1940  MRN: [id number]  Today's Date: 3/7/2017  Onset of Illness/Injury or Date of Surgery Date: 17  Date of Referral to SLP: 17         Admit Date: 3/6/2017     Visit Dx:    ICD-10-CM ICD-9-CM   1. Altered mental status, unspecified R41.82 780.97   2. Weakness R53.1 780.79   3. FTT (failure to thrive) in adult R62.7 783.7   4. Impaired mobility and ADLs Z74.09 799.89   5. Impaired functional mobility, balance, gait, and endurance Z74.09 V49.89     Patient Active Problem List   Diagnosis   • Hypertension   • CVA (cerebral vascular accident)   • Carotid stenosis   • Constipation   • Cerebrovascular accident (CVA)   • Altered mental status, unspecified     Past Medical History   Diagnosis Date   • Arthritis    • Carotid stenosis    • Cataracts, bilateral    • Coronary artery disease    • CVA (cerebral vascular accident)    • Hx of migraines    • Hypertension      Past Surgical History   Procedure Laterality Date   • Cholecystectomy     • Hysterectomy     • Abdominal surgery            SLP EVALUATION (last 72 hours)      SLP Evaluation       17 1030                Rehab Evaluation    Document Type evaluation  -AV        Subjective Information no complaints  -AV        General Information    Patient Profile Review yes  -AV        Onset of Illness/Injury 17  -AV        Subjective Patient Observations alert, cooperative  -AV        Pertinent History Of Current Problem CVA, HTN, CAD, chronic changes on chest x-ray   -AV        Current Diet Limitations thin liquids;regular solid  -AV        Precautions/Limitations, Vision WFL with corrective lenses  -AV        Precautions/Limitations, Hearing other (see comments)   functional for eval purposes  -AV        Prior Level of Function- Communication functional in all spheres  -AV        Prior Level of  Function- Swallowing no diet consistency restrictions  -AV        Plans/Goals Discussed With patient  -AV        Barriers to Rehab none identified  -AV        Living Environment    Lives With other (see comments)   family  -AV        Living Arrangements house  -AV        Clinical Impression    Date of Referral to SLP 03/06/17  -AV        SLP Diagnosis expressive and receptive difficulties   -AV        Prognosis good   -AV        Functional Level At Time Of Evaluation impaired  -AV        Patient's Goals For Discharge return home  -AV        Criteria for Skilled Therapeutic Interventions Met skilled criteria for speech language intervention met  -AV        Rehab Potential good, to achieve stated therapy goals  -AV        Therapy Frequency 5 times/wk  -AV        Anticipated Discharge Disposition home with assist  -AV        Pain Assessment    Pain Assessment No/denies pain  -AV        Cognitive Assessment/Intervention    Current Cognitive/Communication Assessment impaired  -AV        Orientation Status oriented to;person  -AV        Follows Commands/Answers Questions 25% of the time;able to follow single-step instructions;needs cueing  -AV        Short/Long Term Memory unable/difficult to assess  -AV        Auditory Comprehen Assess/Intervention    Auditory Comprehension mild impairment;moderate impairment  -AV        Auditory Comprehension Comment able to follow a few simple one step commands with cues   -AV        Auditory Comprehen Assess/Intervention    Able to Identify Objects moderate impairment  -AV        Answers Yes/No Questions successful, basic questions  -AV        Able to Follow Commands success, 1 step commands  -AV        Verbal Expression Assess/Intervention    Conversational Speech moderate impairment  -AV        Improve ability to comprehend words/phrases/sentences    Improve ability to comprehend words/phrases/sentences through: match objects, field of;match pictures, field of;identify objects,  field of;identify pictures, field of;80%  -AV        Status: Improve ability to comprehend words/phrases/sentences New  -AV        Ability to Comprehend Words/Phrases/Sentences Progress continue to address  -AV        Improve ability to follow directions    Improve ability to follow directions: one-step directions with objects;one-step directions without objects;demonstrate function of object;80%  -AV        Status: Improve ability to follow directions New  -AV        Ability to Follow Directions Progress continue to address  -AV        Improve ability to comprehend questions    Improve ability to comprehend questions: complex yes/no questions;simple general questions;80%  -AV        Status: Improve ability to comprehend questions New  -AV        Ability to Comprehend Questions Progress continue to address  -AV        Improve word retrieval skills    Improve word retrieval skills by: naming an object/pic;completing open ended structured sentence;completing open ended unstructured sentence;answer WH question with one word;80%  -AV        Status: Improve word retrieval skills New  -AV        Word Retrieval Skills Progress continue to address  -AV        Improve ability to construct phrase and sentence level responses    Improve ability to construct phrase and sentence level responses by: answering question with phrase;constructing a sentence with a key word;80%  -AV        Status: Improve ability to construct phrase and sentence level responses New  -AV        Constructing Phrase and Sentence Level Responses Progress continue to address  -AV          User Key  (r) = Recorded By, (t) = Taken By, (c) = Cosigned By    Initials Name Effective Dates    AV Emilia Coleman MS CCC-SLP 03/14/16 -            EDUCATION  The patient has been educated in the following areas:   Cognitive Impairment Communication Impairment.    SLP Recommendation and Plan  SLP Diagnosis: expressive and receptive difficulties   Prognosis: good    Rehab Potential: good, to achieve stated therapy goals  Criteria for Skilled Therapeutic Interventions Met: skilled criteria for speech language intervention met  Anticipated Discharge Disposition: home with assist     Therapy Frequency: 5 times/wk          Plan of Care Review  Plan Of Care Reviewed With: patient  Outcome Summary/Follow up Plan: Cog/comm eval this am:  Patient with expresssive and receptive language difficulties at this time.  rec therapy to address           IP SLP Goals       03/07/17 1344          Receptive Language- Optimal Participation in Care    Receptive Language Optimal Participation in Care- SLP, Date Established 03/07/17  -AV      Receptive Language Optimal Participation in Care- SLP, Time to Achieve by discharge  -AV      Receptive Language Optimal Participation in Care- SLP, Activity Level Patient will improve ability to comprehend words/phrases/sentences;Patient will improve ability to follow directions;Patient will improve ability to comprehend questions  -AV      Expressive- Optimal Participation in Care    Expressive Optimal Participation in Care- SLP, Date Established 03/07/17  -AV      Expressive Optimal Participation in Care- SLP, Time to Achieve by discharge  -AV      Expressive Optimal Participation in Care- SLP, Activity Level Patient will improve word retrieval skills;Patient will improve ability to construct phrase and sentence level responses;Patient will improve connected speech to express thoughts  -AV        User Key  (r) = Recorded By, (t) = Taken By, (c) = Cosigned By    Initials Name Provider Type    EVA Coleman MS CCC-SLP Speech and Language Pathologist              Time Calculation:         Time Calculation- SLP       03/07/17 1345          Time Calculation- SLP    SLP Start Time 1030  -AV      SLP Received On 03/07/17  -AV        User Key  (r) = Recorded By, (t) = Taken By, (c) = Cosigned By    Initials Name Provider Type    EVA Coleman MS  CCC-SLP Speech and Language Pathologist          Therapy Charges for Today     Code Description Service Date Service Provider Modifiers Qty    40386648697 HC ST SPOKEN LANG EXPRESS CURRENT 3/7/2017 Emilia Coleman MS CCC-SLP HONG, CK 1    41673522678 HC ST SPOKEN LANG EXPRESS PROJECTED 3/7/2017 Emilia Coleman MS CCC-DEREJE PITTS, CH 1    76145502089 HC ST EVAL SPEECH AND PROD W LANG  3 3/7/2017 Emilia Coleman MS CCC-SLP GN 1              SLP G-Codes  Functional Limitations: Spoken language expressive  Spoken Language Expression Current Status (): At least 40 percent but less than 60 percent impaired, limited or restricted  Spoken Language Expression Goal Status (): 0 percent impaired, limited or restricted      Emilia Coleman MS CCC-SLP  3/7/2017

## 2017-03-07 NOTE — CONSULTS
Diabetes Education    Patient Name:  oLni Walker  YOB: 1940  MRN: [id number]  Admit Date:  3/6/2017        Patient does not meet criteria for diabetes education order. Therefore, patient was not seen by diabetes education.       Electronically signed by:  Neha Jackson RN  03/07/17 8:08 AM

## 2017-03-07 NOTE — DISCHARGE PLACEMENT REQUEST
"Ivana Mcdonald, RN Case Manager [phone]    Seeking female long term Medicaid pending bed        Jimbo Walker (77 y.o. Female)     Date of Birth Social Security Number Address Home Phone MRN    1940  [id number] [id number] [phone] [id number]    Judaism Marital Status          None        Admission Date Admission Type Admitting Provider Attending Provider Department, Room/Bed    3/6/17 Emergency Maikol Arteaga MD Bansal, Pankaj, MD [facility] 4H, S483/1    Discharge Date Discharge Disposition Discharge Destination                      Attending Provider: Maikol Arteaga MD     Allergies:  Hydrocodone, Oxycodone, Penicillins, Sulfa Antibiotics, Doxycycline, Lyrica [Pregabalin]    Isolation:  None   Infection:  None   Code Status:  FULL    Ht:  57.01\" (144.8 cm)   Wt:  130 lb (59 kg)    Admission Cmt:  None   Principal Problem:  Altered mental status, unspecified [R41.82]                 Active Insurance as of 3/6/2017     Primary Coverage     Payor Plan Insurance Group Employer/Plan Group    MEDICARE MEDICARE A & B      Payor Plan Address Payor Plan Phone Number Effective From Effective To    [address] [phone] 1/1/2004     [place] [address]       Subscriber Name Subscriber Birth Date Member ID       JIMBO WALKER 1940 [id number]           Secondary Coverage     Payor Plan Insurance Group Employer/Plan Group    AARP MED SUPP AARP HEALTH CARE OPTIONS      Payor Plan Address Payor Plan Phone Number Effective From Effective To    [facility] [phone] 1/1/2016     [address]       [place] [address]       Subscriber Name Subscriber Birth Date Member ID       JIMBO WALKER 1940 [id number]                 Emergency Contacts      (Rel.) Home Phone Work Phone Mobile Phone    Maribel López (Daughter) -- -- [phone]               History & Physical      Jaimie Guerra II, DO at 3/6/2017  4:50 PM      "         [facility] Medicine Services  HISTORY AND PHYSICAL    Primary Care Physician: No Known Provider  Subjective   Chief Complaint:  confusion    History of Present Illness:   Ms Walker is a 77-year-old female who presented to [facility] ED on 3/6/17 with altered mental status and increased weakness that started 2 days ago according to the patient's daughter.  Patient was discharged from [facility] approximately 2 weeks ago and was walking 600 feet a day and was oriented and performing her own ADLs.  Patient daughter states she has no other complaints at this time.  Patient is agitated, but can be caused easily.  All labs are negative, x-rays are within normal limits, but patient will be admitted to the hospitalist service for further evaluation and treatment.    Review of Systems   Unable to assess due to mental status change    Past Medical History   Diagnosis Date   • Arthritis    • Carotid stenosis    • Cataracts, bilateral    • Coronary artery disease    • CVA (cerebral vascular accident)    • Hx of migraines    • Hypertension      Past Surgical History   Procedure Laterality Date   • Cholecystectomy     • Hysterectomy     • Abdominal surgery       Family History   Problem Relation Age of Onset   • Arthritis Mother    • Early death Mother    • Heart disease Father    • Hypertension Father    • Hyperlipidemia Father    • Diabetes Father    • Hypertension Daughter      Social History     Social History   • Marital status:      Spouse name: N/A   • Number of children: N/A   • Years of education: N/A     Occupational History   • Not on file.     Social History Main Topics   • Smoking status: Former Smoker     Packs/day: 1.00     Years: 15.00     Quit date: 9/21/2016   • Smokeless tobacco: Never Used   • Alcohol use No   • Drug use: No   • Sexual activity: No     Other Topics Concern   • Not on file     Social History Narrative     Medications:  Current Facility-Administered  "Medications on File Prior to Encounter   Medication   • [COMPLETED] gadobenate dimeglumine (MULTIHANCE) injection 10 mL   • [COMPLETED] iopamidol (ISOVUE-370) 76 % injection 50 mL   • [DISCONTINUED] sodium chloride 0.9 % flush 10 mL     Current Outpatient Prescriptions on File Prior to Encounter   Medication Sig   • ALPRAZolam (XANAX) 0.5 MG tablet Take 0.25 mg by mouth 2 (Two) Times a Day As Needed for anxiety.   • atenolol (TENORMIN) 50 MG tablet Take 50 mg by mouth 2 (Two) Times a Day.   • clopidogrel (PLAVIX) 75 MG tablet Take 75 mg by mouth Daily.   • doxazosin (CARDURA) 2 MG tablet Take 2 mg by mouth Every Night.   • magnesium oxide (MAGOX) 400 (241.3 MG) MG tablet tablet Take 400 mg by mouth 2 (Two) Times a Day.   • pantoprazole (PROTONIX) 40 MG EC tablet Take 40 mg by mouth Daily.   • potassium chloride (K-DUR) 10 MEQ CR tablet Take 10 mEq by mouth Daily.   • [DISCONTINUED] atorvastatin (LIPITOR) 80 MG tablet Take 1 tablet by mouth Every Night.   • [DISCONTINUED] diltiazem CD (CARDIZEM CD) 240 MG 24 hr capsule Take 240 mg by mouth Daily.   • [DISCONTINUED] FLUoxetine (PROzac) 20 MG capsule Take 20 mg by mouth Daily.   • [DISCONTINUED] methylphenidate (RITALIN) 5 MG tablet Take 5 mg by mouth Daily.     Allergies:  Allergies   Allergen Reactions   • Hydrocodone Hives   • Oxycodone Hives   • Penicillins Hives   • Sulfa Antibiotics Hives   • Doxycycline    • Lyrica [Pregabalin]      Objective   Physical Exam:  Vital Signs:   Visit Vitals   • /73   • Pulse 62   • Temp 97.7 °F (36.5 °C) (Oral)   • Resp 20   • Ht 57.01\" (144.8 cm)   • Wt 130 lb (59 kg)   • LMP  (LMP Unknown)   • SpO2 93%   • BMI 28.12 kg/m2     Physical Exam   Constitutional: She appears well-developed and well-nourished. No distress.   HENT:   Head: Normocephalic and atraumatic.   Mouth/Throat: Oropharynx is clear and moist.   Eyes: Conjunctivae and EOM are normal. Pupils are equal, round, and reactive to light. No scleral icterus.   Neck: " Normal range of motion. Neck supple. No JVD present. No tracheal deviation present.   Cardiovascular: Normal rate, regular rhythm, normal heart sounds and intact distal pulses.  Exam reveals no gallop and no friction rub.    No murmur heard.  Pulmonary/Chest: Effort normal and breath sounds normal. No stridor. No respiratory distress. She has no wheezes. She has no rales. She exhibits no tenderness.   Abdominal: Soft. Bowel sounds are normal. She exhibits no distension. There is no tenderness. There is no rebound and no guarding.   Musculoskeletal: Normal range of motion.   No E/C/C   Neurological: She is alert.   Oriented to person, place (hospital) and year.  Speech is clear, follows all commands but slowly, recent remote memory intact. Lower extremities weak but equal bilaterally.  Able to move legs, but cannot lift off of bed.  Left arm weaker than right. Mild left facial droop.     Skin: Skin is warm and dry. No rash noted. She is not diaphoretic. No erythema. There is pallor.   Psychiatric: She has a normal mood and affect. Her behavior is normal. Judgment and thought content normal.   Pleasant and cooperative   Nursing note and vitals reviewed.    Results Reviewed:    Results from last 7 days  Lab Units 03/06/17  1405   WBC 10*3/mm3 5.86   HEMOGLOBIN g/dL 11.8   PLATELETS 10*3/mm3 205       Results from last 7 days  Lab Units 03/06/17  1405   SODIUM mmol/L 139   POTASSIUM mmol/L 3.8   TOTAL CO2 mmol/L 28.0   CREATININE mg/dL 0.70   GLUCOSE mg/dL 100   CALCIUM mg/dL 10.9*     I have personally reviewed and interpreted available lab data, radiology studies and ECG obtained at time of admission.   Assessment / Plan   Problem List:   Principal Problem:    Altered mental status, unspecified  Active Problems:    Hypertension    Carotid stenosis    Cerebrovascular accident (CVA)    Assessment&Plan  -Admit Dr Guerra/Telemetry OBS/Stable  -Neurology consult  -TSH and A1c pending  -NIHSS Qshift  -Neuro checks every 4  hours  -IV hydralazine PRN  -Home meds    DVT prophylaxis:  SHABBIR/SCDs  Code Status:  Full  Admission Status: Patient will be admitted to (LEXOBS or LEXINPT)     Brianda MarcialmarcoGABRIELLE 03/06/17 5:01 PM      Brief Attending Note       I have seen and examined the patient, performing an independent face-to-face diagnostic evaluation with plan of care reviewed and developed with the advanced practice clinician (APC).      Brief Summary Statement/HPI:   77 year old female brought in by EMS for AMS. Patient was seen early this am and found to be negative for CVA, however patient continued to do poorly at home which caused her family to have her brought back in this afternoon. No family at bedside. Patient slightly agitate, but answers some questions. No complaints.    Attending Physical Exam:  Gen-no acute distress, comfortable  HEENT-atraumatic, normocephalic, PERRLA, EOMI, moist mucous membranes present  CV-RRR, S1 S2 normal, no m/r/g  Resp-CTAB, no wheezes or rales; normal respiratory effort  Abd-soft, NT, ND, +BS; no rebound or guarding  Ext-no edema or clubbing  Skin-warm, dry, no rashes or lesions noted  Neuro-Alert and oriented to year. Moves all extremities.  Psych-slightly agitated but pleasant.    CT head personally reviewed and negative for acute disease. Agree with interpretation.    Brief Assessment/Plan :    AMS  --No obvious cause. Wonder if this is hypertensive encephalopathy? Vs progression of some underlying dementia.  --BP control with IV hydralazine.  --TSH, B12, folate, RPR  --Neuro consult in am.    See above for further detailed assessment and plan developed with APC which I have reviewed and/or edited.    I believe this patient meets OBSERVATION status, however if further evaluation or treatment plans warrant, status may change.  Based upon current information, I predict patient's care encounter to be less than or equal to 2 midnights.      Jaimie Guerra II, DO  03/06/17  6:33 PM           Electronically signed by Jaimie Guerra II, DO at 3/6/2017  6:36 PM           Emergency Department Notes      Lesly Geller, APRN at 3/6/2017  4:33 PM          Subjective   Patient is a 77 y.o. female presenting with altered mental status.   History provided by:  Relative  History limited by:  Mental status change  Altered Mental Status   Presenting symptoms: behavior changes, confusion, disorientation and lethargy    Severity:  Severe  Most recent episode:  Today  Episode history:  Continuous  Duration:  2 days  Timing:  Constant  Progression:  Worsening  Chronicity:  New  Context: not alcohol use and not head injury    Context comment:  Was discharged from [facility] approx 2 weeks ago. Daughter states that there she was walking 600ft. daily and was oriented and was preforming ADLs. Reports that this confusion and weakness began 2 days ago .   Associated symptoms: agitation    Associated symptoms: no abdominal pain, no difficulty breathing, no fever and no vomiting    been getting home PT/OT since discharge from [facility]. Daughter states that the Physical Therapist commented on the drastic change she was in the strength and mentation of the pt.     Review of Systems   Unable to perform ROS: Mental status change   Constitutional: Negative for fever.   Gastrointestinal: Negative for abdominal pain and vomiting.   Psychiatric/Behavioral: Positive for agitation and confusion.       Past Medical History   Diagnosis Date   • Arthritis    • Carotid stenosis    • Cataracts, bilateral    • Coronary artery disease    • CVA (cerebral vascular accident)    • Hx of migraines    • Hypertension        Allergies   Allergen Reactions   • Hydrocodone Hives   • Oxycodone Hives   • Penicillins Hives   • Sulfa Antibiotics Hives   • Doxycycline    • Lyrica [Pregabalin]        Past Surgical History   Procedure Laterality Date   • Cholecystectomy     • Hysterectomy     • Abdominal surgery         Family History    Problem Relation Age of Onset   • Arthritis Mother    • Early death Mother    • Heart disease Father    • Hypertension Father    • Hyperlipidemia Father    • Diabetes Father    • Hypertension Daughter        Social History     Social History   • Marital status:      Spouse name: N/A   • Number of children: N/A   • Years of education: N/A     Social History Main Topics   • Smoking status: Former Smoker     Packs/day: 1.00     Years: 15.00     Quit date: 9/21/2016   • Smokeless tobacco: Never Used   • Alcohol use No   • Drug use: No   • Sexual activity: No     Other Topics Concern   • None     Social History Narrative           Objective   Physical Exam   Constitutional: She appears well-developed and well-nourished.   HENT:   Head: Normocephalic and atraumatic.   Right Ear: External ear normal.   Left Ear: External ear normal.   Mouth/Throat: Oropharynx is clear and moist.   Eyes: EOM are normal. Pupils are equal, round, and reactive to light.   Neck: Normal range of motion. Neck supple.   Cardiovascular: Normal rate and regular rhythm.    Pulmonary/Chest: Effort normal and breath sounds normal.   Abdominal: Soft. Bowel sounds are normal. She exhibits no distension. There is no tenderness. There is no guarding.   Lymphadenopathy:     She has no cervical adenopathy.   Neurological: She is alert. She is disoriented.   Skin: Skin is warm.   Psychiatric: She has a normal mood and affect. Her speech is normal and behavior is normal. reviewed.      Procedures        ED Course  ED Course      Recent Results (from the past 24 hour(s))   POC CHEM 8    Collection Time: 03/05/17 10:23 PM   Result Value Ref Range    Glucose 107 70 - 130 mg/dL    BUN, Arterial 21 8 - 26 mg/dL    Creatinine 0.90 0.60 - 1.30 mg/dL    Sodium, Arterial 141 138 - 146 mmol/L    Potassium, Arterial 3.9 3.5 - 4.9 mmol/L    Chloride, Arterial 103 98 - 109 mmol/L    Total CO2 28 24 - 29 mmol/L    Hemoglobin 12.9 12.0 - 17.0 g/dL    Hematocrit 38  38 - 51 %    Ionized Calcium, Arterial 1.33 (H) 1.20 - 1.32 mmol/L   POC Protime / INR    Collection Time: 03/05/17 10:31 PM   Result Value Ref Range    Protime 12.2 (L) 12.8 - 15.2 seconds    INR 1.0 0.8 - 1.2   aPTT    Collection Time: 03/05/17 10:34 PM   Result Value Ref Range    PTT <24.0 (L) 24.0 - 31.0 seconds   AST    Collection Time: 03/05/17 10:34 PM   Result Value Ref Range    AST (SGOT) 39 (H) 0 - 33 U/L   ALT    Collection Time: 03/05/17 10:34 PM   Result Value Ref Range    ALT (SGPT) 41 (H) 7 - 40 U/L   Light Blue Top    Collection Time: 03/05/17 10:34 PM   Result Value Ref Range    Extra Tube hold for add-on    Green Top (Gel)    Collection Time: 03/05/17 10:34 PM   Result Value Ref Range    Extra Tube Hold for add-ons.    Lavender Top    Collection Time: 03/05/17 10:34 PM   Result Value Ref Range    Extra Tube hold for add-on    Gold Top - SST    Collection Time: 03/05/17 10:34 PM   Result Value Ref Range    Extra Tube Hold for add-ons.    CBC Auto Differential    Collection Time: 03/05/17 10:34 PM   Result Value Ref Range    WBC 6.49 3.50 - 10.80 10*3/mm3    RBC 4.17 3.89 - 5.14 10*6/mm3    Hemoglobin 12.6 11.5 - 15.5 g/dL    Hematocrit 38.4 34.5 - 44.0 %    MCV 92.1 80.0 - 99.0 fL    MCH 30.2 27.0 - 31.0 pg    MCHC 32.8 32.0 - 36.0 g/dL    RDW 13.8 11.3 - 14.5 %    RDW-SD 46.5 37.0 - 54.0 fl    MPV 12.6 (H) 6.0 - 12.0 fL    Platelets 56 (L) 150 - 450 10*3/mm3    Neutrophil % 62.6 41.0 - 71.0 %    Lymphocyte % 28.8 24.0 - 44.0 %    Monocyte % 5.9 0.0 - 12.0 %    Eosinophil % 1.7 0.0 - 3.0 %    Basophil % 0.5 0.0 - 1.0 %    Immature Grans % 0.5 0.0 - 0.6 %    Neutrophils, Absolute 4.07 1.50 - 8.30 10*3/mm3    Lymphocytes, Absolute 1.87 0.60 - 4.80 10*3/mm3    Monocytes, Absolute 0.38 0.00 - 1.00 10*3/mm3    Eosinophils, Absolute 0.11 0.10 - 0.30 10*3/mm3    Basophils, Absolute 0.03 0.00 - 0.20 10*3/mm3    Immature Grans, Absolute 0.03 0.00 - 0.03 10*3/mm3   POC Troponin, Rapid    Collection Time:  03/05/17 10:34 PM   Result Value Ref Range    Troponin I 0.01 0.00 - 0.07 ng/mL   Blood Culture    Collection Time: 03/05/17 11:00 PM   Result Value Ref Range    Blood Culture No growth at less than 24 hours    Blood Culture    Collection Time: 03/05/17 11:00 PM   Result Value Ref Range    Blood Culture No growth at less than 24 hours    Lactic Acid, Plasma    Collection Time: 03/05/17 11:00 PM   Result Value Ref Range    Lactate 0.6 0.5 - 2.0 mmol/L   Influenza Antigen    Collection Time: 03/06/17 12:08 AM   Result Value Ref Range    Influenza A Ag, EIA Negative Negative    Influenza B Ag, EIA Negative Negative   POC Glucose Fingerstick    Collection Time: 03/06/17  1:03 PM   Result Value Ref Range    Glucose 106 70 - 130 mg/dL   Comprehensive Metabolic Panel    Collection Time: 03/06/17  2:05 PM   Result Value Ref Range    Glucose 100 70 - 100 mg/dL    BUN 16 9 - 23 mg/dL    Creatinine 0.70 0.60 - 1.30 mg/dL    Sodium 139 132 - 146 mmol/L    Potassium 3.8 3.5 - 5.5 mmol/L    Chloride 104 99 - 109 mmol/L    CO2 28.0 20.0 - 31.0 mmol/L    Calcium 10.9 (H) 8.7 - 10.4 mg/dL    Total Protein 7.0 5.7 - 8.2 g/dL    Albumin 4.40 3.20 - 4.80 g/dL    ALT (SGPT) 39 7 - 40 U/L    AST (SGOT) 29 0 - 33 U/L    Alkaline Phosphatase 76 25 - 100 U/L    Total Bilirubin 0.2 (L) 0.3 - 1.2 mg/dL    eGFR Non African Amer 81 >60 mL/min/1.73    Globulin 2.6 gm/dL    A/G Ratio 1.7 1.5 - 2.5 g/dL    BUN/Creatinine Ratio 22.9 7.0 - 25.0    Anion Gap 7.0 3.0 - 11.0 mmol/L   Magnesium    Collection Time: 03/06/17  2:05 PM   Result Value Ref Range    Magnesium 2.1 1.3 - 2.7 mg/dL   CBC Auto Differential    Collection Time: 03/06/17  2:05 PM   Result Value Ref Range    WBC 5.86 3.50 - 10.80 10*3/mm3    RBC 4.01 3.89 - 5.14 10*6/mm3    Hemoglobin 11.8 11.5 - 15.5 g/dL    Hematocrit 36.6 34.5 - 44.0 %    MCV 91.3 80.0 - 99.0 fL    MCH 29.4 27.0 - 31.0 pg    MCHC 32.2 32.0 - 36.0 g/dL    RDW 13.8 11.3 - 14.5 %    RDW-SD 45.8 37.0 - 54.0 fl    MPV  11.5 6.0 - 12.0 fL    Platelets 205 150 - 450 10*3/mm3    Neutrophil % 58.6 41.0 - 71.0 %    Lymphocyte % 30.9 24.0 - 44.0 %    Monocyte % 7.8 0.0 - 12.0 %    Eosinophil % 2.2 0.0 - 3.0 %    Basophil % 0.3 0.0 - 1.0 %    Immature Grans % 0.2 0.0 - 0.6 %    Neutrophils, Absolute 3.43 1.50 - 8.30 10*3/mm3    Lymphocytes, Absolute 1.81 0.60 - 4.80 10*3/mm3    Monocytes, Absolute 0.46 0.00 - 1.00 10*3/mm3    Eosinophils, Absolute 0.13 0.10 - 0.30 10*3/mm3    Basophils, Absolute 0.02 0.00 - 0.20 10*3/mm3    Immature Grans, Absolute 0.01 0.00 - 0.03 10*3/mm3   POC Troponin, Rapid    Collection Time: 03/06/17  2:08 PM   Result Value Ref Range    Troponin I 0.00 0.00 - 0.07 ng/mL   Urinalysis With / Culture If Indicated    Collection Time: 03/06/17  2:16 PM   Result Value Ref Range    Color, UA Yellow Yellow, Straw    Appearance, UA Clear Clear    pH, UA 6.0 5.0 - 8.0    Specific Gravity, UA >=1.030 1.001 - 1.030    Glucose, UA Negative Negative    Ketones, UA Negative Negative    Bilirubin, UA Negative Negative    Blood, UA Negative Negative    Protein, UA 30 mg/dL (1+) (A) Negative    Leuk Esterase, UA Negative Negative    Nitrite, UA Negative Negative    Urobilinogen, UA 0.2 E.U./dL 0.2 - 1.0 E.U./dL   Urinalysis, Microscopic Only    Collection Time: 03/06/17  2:16 PM   Result Value Ref Range    RBC, UA 0-2 None Seen, 0-2 /HPF    WBC, UA 0-2 (A) None Seen /HPF    Bacteria, UA None Seen None Seen, Trace /HPF    Squamous Epithelial Cells, UA 0-2 None Seen, 0-2 /HPF    Hyaline Casts, UA 0-6 0 - 6 /LPF    Methodology Automated Microscopy    POC Troponin, Rapid    Collection Time: 03/06/17  4:15 PM   Result Value Ref Range    Troponin I 0.00 0.00 - 0.07 ng/mL     Note: In addition to lab results from this visit, the labs listed above may include labs taken at another facility or during a different encounter within the last 24 hours. Please correlate lab times with ED admission and discharge times for further clarification of  "the services performed during this visit.    XR Chest 1 View   Preliminary Result   Chronic changes seen within the lung fields with no evidence   of acute parenchymal disease.       D:  03/06/2017   E:  03/06/2017                    Vitals:    03/06/17 1239 03/06/17 1330 03/06/17 1400 03/06/17 1430   BP: (!) 200/84 153/65 159/76 173/73   BP Location: Left arm      Patient Position: Sitting      Pulse: 67 69 80 62   Resp: 20      Temp: 97.7 °F (36.5 °C)      TempSrc: Oral      SpO2: 96% 93% 93% 93%   Weight: 130 lb (59 kg)      Height: 57.01\" (144.8 cm)        Medications   sodium chloride 0.9 % flush 10 mL (not administered)     ECG/EMG Results (last 24 hours)     Procedure Component Value Units Date/Time    ECG 12 Lead [[id number]] Collected:  03/06/17 1258     Updated:  03/06/17 1359    Narrative:       Test Reason : Weak/Dizzy/AMS protocol  Blood Pressure : **/** mmHG  Vent. Rate : 064 BPM     Atrial Rate : 064 BPM     P-R Int : 150 ms          QRS Dur : 084 ms      QT Int : 380 ms       P-R-T Axes : 019 -22 178 degrees     QTc Int : 392 ms    Normal sinus rhythm  Left ventricular hypertrophy with repolarization abnormality  Abnormal ECG  When compared with ECG of 05-MAR-2017 22:40,  No significant change was found  Confirmed by GAYLE BAUTISTA MD ([id number]) on 3/6/2017 1:59:41 PM    Referred By:  EDMD           Confirmed By:GAYLE BAUTISTA MD    ECG 12 Lead [[id number]] Collected:  03/06/17 1612     Updated:  03/06/17 1612        Spoke with Dr. Guerra who will admit the pt to telemetry. Daughter agrees with the admission.               MDM    Final diagnoses:   Altered mental status, unspecified   Weakness   FTT (failure to thrive) in adult           GABRIELLE Post  03/06/17 1647      GABRIELLE Post  03/06/17 1703       Electronically signed by GABRIELLE Post at 3/6/2017  5:03 PM        Hospital Medications (active)       Dose Frequency Start End    acetaminophen (TYLENOL) tablet 650 mg 650 mg Every 4 Hours " "PRN 3/6/2017     Sig - Route: Take 2 tablets by mouth Every 4 (Four) Hours As Needed for mild pain (1-3). - Oral    aspirin suppository 300 mg 300 mg Daily 3/7/2017     Sig - Route: Insert 1 suppository into the rectum Daily. - Rectal    Linked Group 1:  \"Or\" Linked Group Details        aspirin tablet 325 mg 325 mg Daily 3/7/2017     Sig - Route: Take 1 tablet by mouth Daily. - Oral    Linked Group 1:  \"Or\" Linked Group Details        atenolol (TENORMIN) tablet 50 mg 50 mg Every 12 Hours Scheduled 3/6/2017     Sig - Route: Take 1 tablet by mouth Every 12 (Twelve) Hours. - Oral    atorvastatin (LIPITOR) tablet 80 mg 80 mg Nightly 3/6/2017     Sig - Route: Take 2 tablets by mouth Every Night. - Oral    bisacodyl (DULCOLAX) suppository 10 mg 10 mg Daily PRN 3/6/2017     Sig - Route: Insert 1 suppository into the rectum Daily As Needed for constipation. - Rectal    calcium carbonate (TUMS) chewable tablet 500 mg (200 mg elemental) 2 tablet 2 Times Daily PRN 3/6/2017     Sig - Route: Chew 1,000 mg 2 (Two) Times a Day As Needed for heartburn. - Oral    clopidogrel (PLAVIX) tablet 75 mg 75 mg Daily 3/6/2017     Sig - Route: Take 1 tablet by mouth Daily. - Oral    diltiaZEM CD (CARDIZEM CD) 24 hr capsule 240 mg 240 mg Nightly 3/6/2017     Sig - Route: Take 1 capsule by mouth Every Night. - Oral    doxazosin (CARDURA) tablet 2 mg 2 mg Nightly 3/6/2017     Sig - Route: Take 2 tablets by mouth Every Night. - Oral    FLUoxetine (PROzac) capsule 20 mg 20 mg Daily 3/7/2017     Sig - Route: Take 1 capsule by mouth Daily. - Oral    haloperidol lactate (HALDOL) injection 2 mg 2 mg Every 6 Hours PRN 3/6/2017     Sig - Route: Infuse 0.4 mL into a venous catheter Every 6 (Six) Hours As Needed for agitation. - Intravenous    haloperidol lactate (HALDOL) injection 2 mg 2 mg Once 3/6/2017 3/6/2017    Sig - Route: Infuse 0.4 mL into a venous catheter 1 (One) Time. - Intravenous    hydrALAZINE (APRESOLINE) injection 10 mg 10 mg Every 6 " "Hours PRN 3/6/2017     Sig - Route: Infuse 0.5 mL into a venous catheter Every 6 (Six) Hours As Needed for high blood pressure. - Intravenous    LORazepam (ATIVAN) injection 0.5 mg 0.5 mg Every 6 Hours PRN 3/6/2017 3/16/2017    Sig - Route: Infuse 0.25 mL into a venous catheter Every 6 (Six) Hours As Needed for Anxiety. - Intravenous    magnesium oxide (MAGOX) tablet 400 mg 400 mg 2 Times Daily 3/6/2017     Sig - Route: Take 1 tablet by mouth 2 (Two) Times a Day. - Oral    magnesium sulfate 6 g in dextrose (D5W) 5 % 250 mL infusion 6 g As Needed 3/6/2017     Sig - Route: Infuse 6 g into a venous catheter As Needed (Mg 1.1-1.5 mg/dL). - Intravenous    Linked Group 2:  \"Or\" Linked Group Details        magnesium sulfate 8 g in dextrose (D5W) 5 % 250 mL infusion 8 g As Needed 3/6/2017     Sig - Route: Infuse 8 g into a venous catheter As Needed (Mg less than or equal to 1 mg/dL). - Intravenous    Linked Group 2:  \"Or\" Linked Group Details        magnesium sulfate in D5W 1g/100mL (PREMIX) IVPB 1 g 1 g As Needed 3/6/2017     Sig - Route: Infuse 100 mL into a venous catheter As Needed (Mg less than or equal 1 mg/dL). - Intravenous    Linked Group 2:  \"Or\" Linked Group Details        magnesium sulfate in D5W 1g/100mL (PREMIX) IVPB 1 g 1 g As Needed 3/6/2017     Sig - Route: Infuse 100 mL into a venous catheter As Needed (magnesium sulfate 1 g in D5W 100 mL IVPB - Mg 1.6 - 1.9 mg/dL). - Intravenous    Linked Group 2:  \"Or\" Linked Group Details        methylphenidate (RITALIN) tablet 5 mg 5 mg Daily 3/7/2017     Sig - Route: Take 1 tablet by mouth Daily. - Oral    ondansetron (ZOFRAN) injection 4 mg 4 mg Every 6 Hours PRN 3/6/2017     Sig - Route: Infuse 2 mL into a venous catheter Every 6 (Six) Hours As Needed for Nausea or Vomiting. - Intravenous    Linked Group 3:  \"Or\" Linked Group Details        ondansetron (ZOFRAN) tablet 4 mg 4 mg Every 6 Hours PRN 3/6/2017     Sig - Route: Take 1 tablet by mouth Every 6 (Six) Hours As " "Needed for Nausea or Vomiting. - Oral    Linked Group 3:  \"Or\" Linked Group Details        pantoprazole (PROTONIX) EC tablet 40 mg 40 mg Daily 3/7/2017     Sig - Route: Take 1 tablet by mouth Daily. - Oral    potassium chloride (KLOR-CON) packet 40 mEq 40 mEq As Needed 3/6/2017     Sig - Route: Take 40 mEq by mouth As Needed (potassium replacement, see admin instructions). - Oral    Linked Group 4:  \"Or\" Linked Group Details        potassium chloride (MICRO-K) CR capsule 40 mEq 40 mEq As Needed 3/6/2017     Sig - Route: Take 4 capsules by mouth As Needed (potassium replacement.  see admin instructions). - Oral    Linked Group 4:  \"Or\" Linked Group Details        potassium chloride 10 mEq in 100 mL IVPB 10 mEq Every 1 Hour PRN 3/6/2017     Sig - Route: Infuse 100 mL into a venous catheter Every 1 (One) Hour As Needed (potassium protocol PERIPHERAL - see admin instructions). - Intravenous    Linked Group 4:  \"Or\" Linked Group Details        rivastigmine (EXELON) 4.6 MG/24HR patch 1 patch 1 patch Daily 3/7/2017     Sig - Route: Place 1 patch on the skin Daily. - Transdermal    sennosides-docusate sodium (SENOKOT-S) 8.6-50 MG tablet 2 tablet 2 tablet 2 Times Daily PRN 3/6/2017     Sig - Route: Take 2 tablets by mouth 2 (Two) Times a Day As Needed for constipation. - Oral    sodium chloride 0.9 % flush 1-10 mL 1-10 mL As Needed 3/6/2017     Sig - Route: Infuse 1-10 mL into a venous catheter As Needed for line care. - Intravenous    sodium chloride 0.9 % flush 10 mL (Discontinued) 10 mL As Needed 3/6/2017 3/6/2017    Sig - Route: Infuse 10 mL into a venous catheter As Needed for line care. - Intravenous    Cosign for Ordering: Accepted by Wilmer Chan MD on 3/6/2017  4:25 PM          Operative/Procedure Notes (all)     No notes of this type exist for this encounter.        Physician Progress Notes (all)     No notes of this type exist for this encounter.           Consult Notes (all)      Jaimie Holguin MD at " 3/7/2017 12:22 PM  Version 1 of 1     Consult Orders:    1. Inpatient Consult to Neurology [[id number]] ordered by GABRIELLE Winter at 03/06/17 1650                Neurology    Referring Provider: GABRIELLE Winter    Reason for Consultation: encephalopathy      Chief complaint: Encephalopathy    Subjective .     History of present illness:  Ms. Walker is a pleasant 77-year-old female with past medical history significant for hypertension, CAD, and reported prior stroke who is admitted to the hospitalist service for confusion.  The history is obtained from chart review his family was not present at baseline and the patient is somewhat of a poor historian.  Per notes her symptoms began about 2 days prior to presentation where she has had gradually worsening confusion and increased generalized weakness.  She is a resident of [facility] and apparently has had difficulty performing her ADLs and ambulating.  On arrival to the ED here she was found to be extremely hypertensive with systolic BP's in the 200s.  BP has improved somewhat this morning but still elevated.  The patient was agitated on presentation she reports feeling much better this morning.    Review of Systems: Positive for confusion.  Otherwise unable to fully obtain given mental status    History  Past Medical History   Diagnosis Date   • Arthritis    • Carotid stenosis    • Cataracts, bilateral    • Coronary artery disease    • CVA (cerebral vascular accident)    • Hx of migraines    • Hypertension    ,   Past Surgical History   Procedure Laterality Date   • Cholecystectomy     • Hysterectomy     • Abdominal surgery     ,   Family History   Problem Relation Age of Onset   • Arthritis Mother    • Early death Mother    • Heart disease Father    • Hypertension Father    • Hyperlipidemia Father    • Diabetes Father    • Hypertension Daughter    ,   Social History   Substance Use Topics   • Smoking status: Former Smoker     Packs/day: 1.00      Years: 15.00     Quit date: 9/21/2016   • Smokeless tobacco: Never Used   • Alcohol use No   ,   Prescriptions Prior to Admission   Medication Sig Dispense Refill Last Dose   • atorvastatin (LIPITOR) 20 MG tablet Take 20 mg by mouth Daily.      • cetirizine (zyrTEC) 10 MG tablet Take 10 mg by mouth Daily.      • diclofenac (VOLTAREN) 75 MG EC tablet Take 75 mg by mouth 2 (Two) Times a Day.      • dicyclomine (BENTYL) 20 MG tablet Take 20 mg by mouth Every 6 (Six) Hours.      • diltiaZEM CD (CARDIZEM CD) 300 MG 24 hr capsule Take 300 mg by mouth Daily.      • ferrous sulfate 325 (65 FE) MG tablet Take 325 mg by mouth 2 (Two) Times a Day.      • fluticasone (FLONASE) 50 MCG/ACT nasal spray 2 sprays into each nostril Daily.      • furosemide (LASIX) 20 MG tablet Take 20 mg by mouth Every Other Day.      • mirtazapine (REMERON) 15 MG tablet Take 15 mg by mouth Every Night.      • Multiple Vitamins-Minerals (MULTIVITAMIN PO) Take 1 tablet by mouth Daily.      • sertraline (ZOLOFT) 50 MG tablet Take 50 mg by mouth Daily.      • traMADol (ULTRAM) 50 MG tablet Take 50 mg by mouth 2 (Two) Times a Day.      • vitamin C (ASCORBIC ACID) 250 MG tablet Take 250 mg by mouth Daily.      • ALPRAZolam (XANAX) 0.5 MG tablet Take 0.25 mg by mouth 2 (Two) Times a Day As Needed for anxiety.      • atenolol (TENORMIN) 50 MG tablet Take 50 mg by mouth 2 (Two) Times a Day.      • clopidogrel (PLAVIX) 75 MG tablet Take 75 mg by mouth Daily.      • doxazosin (CARDURA) 2 MG tablet Take 2 mg by mouth Every Night.      • magnesium oxide (MAGOX) 400 (241.3 MG) MG tablet tablet Take 400 mg by mouth 2 (Two) Times a Day.      • pantoprazole (PROTONIX) 40 MG EC tablet Take 40 mg by mouth Daily.      • potassium chloride (K-DUR) 10 MEQ CR tablet Take 10 mEq by mouth Daily.       and Allergies:  Hydrocodone; Oxycodone; Penicillins; Sulfa antibiotics; Doxycycline; and Lyrica [pregabalin]    Objective     Vital Signs   Blood pressure 169/69, pulse 92,  "temperature 98.7 °F (37.1 °C), temperature source Oral, resp. rate 16, height 57.01\" (144.8 cm), weight 130 lb (59 kg), SpO2 95 %, not currently breastfeeding.    Physical Exam:      Gen: Sitting up in bedside chair with eyes open. In NAD. Appears stated age   Eyes: PERRL, conjuntivae/lids normal   ENT: External canals normal bilaterally. Oropharynx normal.   Neck: Supple. No thyroid enlargement noted   Respiratory: CTA bilaterally. Respirations unlabored   CV: RRR, S1 and S2 nml. Radial pulses 2+ bilaterally.    Skin: No rashes noted on exposed skin. Normal tugor.   MSK: Normal bulk and tone. Nml ROM     Neurologic:   Mental status: Awake, alert, disoriented.  She thought the year was 1977 and that she was currently in a \"condominium.\"  Follows commands. Speech fluent.    CN: PERRL, EOM intact, sensation intact in upper/mid/lower face bilaterally, facial movements symmetric, hearing intact to finger rub bilaterally, palate elevates symmetrically, tongue movements and SCMs strong bilaterally    Motor: Strength full (5/5) throughout in BUE and BLE    Reflexes: 2+ and symmetric throughout   Sensory: Intact to LT throughout   Coordination: No dysmetria noted   Gait: Not tested        Results Reviewed:   reviewed  MRI brain personally reviewed.  No evidence of acute infarct seen.                 Assessment&#47;Plan     1.  Encephalopathy = differential includes hypertensive encephalopathy given her extremely high BPM presentation, versus other metabolic cause leading to delirium.  There is also some concern for possible underlying dementia.  Continue BP correction and adjust meds as necessary.  Continue supportive care and medical workup per primary team.  Follow-up metabolic labs.  We will start trial of Exelon patch for possible underlying dementia.  Recommend low-dose Seroquel at bedtime if needed for agitation.        Jaimie Holguin MD  03/07/17  12:22 PM               Electronically signed by Jaimie Holguin, " MD at 3/7/2017 12:27 PM           Physical Therapy Notes (most recent note)      Sheri Aleman, PT at 3/7/2017 10:15 AM  Version 1 of 1         Acute Care - Physical Therapy Initial Evaluation  [facility]     Patient Name: Loni Walker  : 1940  MRN: [id number]  Today's Date: 3/7/2017   Onset of Illness/Injury or Date of Surgery Date: 17  Date of Referral to PT: 17  Referring Physician: GABRIELLE GASPAR      Admit Date: 3/6/2017     Visit Dx:    ICD-10-CM ICD-9-CM   1. Altered mental status, unspecified R41.82 780.97   2. Weakness R53.1 780.79   3. FTT (failure to thrive) in adult R62.7 783.7   4. Impaired mobility and ADLs Z74.09 799.89   5. Impaired functional mobility, balance, gait, and endurance Z74.09 V49.89     Patient Active Problem List   Diagnosis   • Hypertension   • CVA (cerebral vascular accident)   • Carotid stenosis   • Constipation   • Cerebrovascular accident (CVA)   • Altered mental status, unspecified     Past Medical History   Diagnosis Date   • Arthritis    • Carotid stenosis    • Cataracts, bilateral    • Coronary artery disease    • CVA (cerebral vascular accident)    • Hx of migraines    • Hypertension      Past Surgical History   Procedure Laterality Date   • Cholecystectomy     • Hysterectomy     • Abdominal surgery            PT ASSESSMENT (last 72 hours)      PT Evaluation       17 0822 17 0821    Rehab Evaluation    Document Type evaluation  -CD evaluation  -AN    Subjective Information no complaints;agree to therapy  -CD no complaints;agree to therapy  -AN    Symptoms Noted During/After Treatment --   Noted eyes to be red, watering and glassy- nsg notified.   -CD     General Information    Patient Profile Review yes  -CD yes  -AN    Onset of Illness/Injury or Date of Surgery Date 17  -CD 17  -AN    Referring Physician GABRIELLE GASPAR  -GABRIELLE Mtz  -KAR    General Observations PT SUPINE UPON ARRIVAL ON RA. NSG IN GIVING MEDS. DTR PRESENT.   -CD Pt  supine in bed getting meds from nursing, Daughter present  -AN    Pertinent History Of Current Problem Pt. admitted with speech deficits, R sided weakness, AMS. Pt home 2 wks from SNF rehab following stroke rehab from September..Daughter reports progressive decline, fall, increase residual R weakness. MRI, CT (-) for acute injury.  -CD Pt. admitted with speech deficits, R sided weakness, AMS. Pt home 2 wks from SNF rehab following stroke rehab from September..Daughter reports progressive decline, fall, increase residual R weakness. MRI, CT (-) for acute injury.  -AN    Precautions/Limitations fall precautions   CONFUSION. NEEDS EXIT ALARM.   -CD fall precautions   exit alarm; confusion  -AN    Prior Level of Function independent:;feeding;min assist:;all household mobility;bed mobility;transfer;gait  -CD independent:;feeding;min assist:;all household mobility;transfer;bed mobility;grooming;dressing;bathing;dependent:;home management;driving   daughter arranges meds  -AN    Equipment Currently Used at Home wheelchair;shower chair;walker, rolling  -CD wheelchair;shower chair  -AN    Plans/Goals Discussed With patient and family;agreed upon  -CD patient and family;agreed upon  -AN    Risks Reviewed patient and family:;LOB;dizziness;increased discomfort;change in vital signs  -CD patient and family:;LOB;dizziness;increased discomfort;change in vital signs  -AN    Benefits Reviewed patient and family:;improve function;increase independence;increase strength;increase balance;increase knowledge  -CD improve function;patient and family:;increase independence;increase strength;increase balance  -AN    Barriers to Rehab previous functional deficit;cognitive status;medically complex  -CD cognitive status;previous functional deficit;medically complex  -AN    Living Environment    Lives With child(rolando), adult   DAUGHTER  -CD child(rolando), adult   daugher  -AN    Living Arrangements house  -CD house  -AN    Home Accessibility bed  and bath on same level  -CD     Stair Railings at Home none  -CD none  -AN    Living Environment Comment LIVES WITH DTR WHO ASSISTS WITH CARE.   -CD     Clinical Impression    Date of Referral to PT 03/06/17  -CD     PT Diagnosis IMPAIRED FUNCTIONAL MOIBLITY.   -CD     Patient/Family Goals Statement DID NOT STATE.   -CD     Criteria for Skilled Therapeutic Interventions Met yes  -CD     Rehab Potential good, to achieve stated therapy goals  -CD     Vital Signs    Pre Systolic BP Rehab --   PER O.T.   -  -AN    Pre Treatment Diastolic BP  67  -AN    Intra Systolic BP Rehab  177  -AN    Intra Treatment Diastolic BP  91  -AN    Post Systolic BP Rehab  155  -AN    Post Treatment Diastolic BP  82  -AN    Pretreatment Heart Rate (beats/min)  118  -AN    Intratreatment Heart Rate (beats/min)  120  -AN    Posttreatment Heart Rate (beats/min)  93  -AN    Pre Patient Position  Supine  -AN    Intra Patient Position  Sitting  -AN    Post Patient Position  Sitting  -AN    Pain Assessment    Pain Assessment Pierce-Vieyra FACES  -CD Pierce-Baker FACES  -AN    Pierce-Vieyra FACES Pain Rating 2  -CD 2  -AN    Pain Location Leg  -CD Leg  -AN    Pain Orientation Right  -CD Right  -AN    Vision Assessment/Intervention    Visual Impairment unable/difficult to assess   PT WEARS GLASSES.   -CD unable/difficult to assess   wears glasses  -AN    Cognitive Assessment/Intervention    Current Cognitive/Communication Assessment impaired  -CD impaired  -AN    Orientation Status oriented to;person  -CD oriented to;person  -AN    Follows Commands/Answers Questions 50% of the time;needs cueing;needs increased time;needs repetition  -CD 50% of the time;needs repetition;needs increased time;needs cueing;75% of the time  -AN    Personal Safety moderate impairment;decreased awareness, need for assist;decreased awareness, need for safety;decreased insight to deficits  -CD moderate impairment;decreased insight to deficits  -AN    Personal Safety  Interventions fall prevention program maintained;elopement precautions initiated;gait belt;muscle strengthening facilitated;nonskid shoes/slippers when out of bed;supervised activity  -CD fall prevention program maintained  -AN    Short/Long Term Memory moderate impairment, short term memory  -CD moderate impairment, short term memory  -AN    ROM (Range of Motion)    General ROM lower extremity range of motion deficits identified   HYPERTONICITY- REQUIRES AAROM FOR FULL AVAILABLE RANGE.   -CD upper extremity range of motion deficits identified  -AN    General ROM Detail  shoulder flex AROM R to 90,AAROM over 140; L UE and R elbow, wrist, hand WFL; cues to flex R hand  -AN    MMT (Manual Muscle Testing)    General MMT Assessment lower extremity strength deficits identified   UNABLE TO FOLLOW COMMANDS FOR MMT- GROSSLY 2-3/5 R, 3-4/5 L.  -CD     General MMT Assessment Detail  --   Difficult to test with cognitive deficits; R hand shoulder 4  -AN    Muscle Tone Assessment    Muscle Tone Assessment Bilateral Lower Extremities   hypertonic, R >L.   -CD     Bed Mobility, Assessment/Treatment    Bed Mobility, Assistive Device head of bed elevated  -CD head of bed elevated  -AN    Bed Mobility, Scoot/Bridge, [place] moderate assist (50% patient effort);verbal cues required  -CD moderate assist (50% patient effort)  -AN    Bed Mob, Supine to Sit, [place]  moderate assist (50% patient effort);2 person assist required  -AN    Transfer Assessment/Treatment    Transfers, Sit-Stand [place] moderate assist (50% patient effort);2 person assist required;verbal cues required  -CD moderate assist (50% patient effort);2 person assist required;verbal cues required  -AN    Transfers, Stand-Sit [place] moderate assist (50% patient effort);2 person assist required;verbal cues required  -CD moderate assist (50% patient effort);2 person assist required;verbal cues required  -AN    Transfers, Sit-Stand-Sit, Assist Device  rolling walker  -CD     Toilet Transfer, [place] moderate assist (50% patient effort);2 person assist required;verbal cues required  -CD moderate assist (50% patient effort);2 person assist required  -AN    Toilet Transfer, Assistive Device bedside commode without drop arms  -CD bedside commode without drop arms  -AN    Transfer, Safety Issues sequencing ability decreased;step length decreased;weight-shifting ability decreased;balance decreased during turns  -CD sequencing ability decreased;balance decreased during turns;step length decreased;weight-shifting ability decreased  -AN    Transfer, Impairments ROM decreased;strength decreased;impaired balance;coordination impaired  -CD ROM decreased;decreased flexibility;strength decreased;impaired balance;coordination impaired  -AN    Gait Assessment/Treatment    Gait, [place] Level maximum assist (25% patient effort);2 person assist required  -CD     Gait, Assistive Device rolling walker  -CD     Gait, Distance (Feet) 6  -CD     Gait, Gait Deviations leans to the right;step length decreased;alan decreased   R LE ADDUCTED AND INVERTED WITH EXTENSOR TONE.   -CD     Gait, Impairments strength decreased;impaired balance;coordination impaired;ROM decreased;muscle tone abnormal  -CD     Motor Skills/Interventions    Additional Documentation  Balance Skills Training (Group);Fine Motor Coordination Training (Group);Gross Motor Coordination Training (Group)  -AN    Balance Skills Training    Sitting-Level of Assistance  Close supervision  -AN    Sitting-Balance Support  Left upper extremity supported  -AN    Sitting-Balance Activities  Forward lean;Reaching for objects  -AN    Sitting # of Minutes  8  -AN    Standing-Level of Assistance  Moderate assistance;x2  -AN    Static Standing Balance Support  Right upper extremity supported;Left upper extremity supported  -AN    Standing-Balance Activities  Weight Shift R-L  -AN    Standing Balance # of Minutes  1  -AN     Fine Motor Coordination Training    Opposition  Right:;Left:;impaired  -AN    Gross Motor Coordination Training    Gross Motor Skill, Impairments Detail  impaired  -AN    Positioning and Restraints    Pre-Treatment Position in bed  -CD in bed  -AN    Post Treatment Position chair  -CD chair  -AN    In Chair reclined;call light within reach;notified nsg;encouraged to call for assist;exit alarm on;with family/caregiver;legs elevated;RUE elevated;LUE elevated  -CD notified nsg;reclined;call light within reach;encouraged to call for assist;exit alarm on;with family/caregiver  -AN      03/06/17 1841       General Information    Equipment Currently Used at Home shower chair;wheelchair  -EB     Living Environment    Lives With child(rolando), adult  -EB     Living Arrangements house  -EB     Stair Railings at Home none  -EB     Type of Financial/Environmental Concern none  -EB     Transportation Available car  -EB     Living Environment Comment DTR LIVES WITH PT  -EB       User Key  (r) = Recorded By, (t) = Taken By, (c) = Cosigned By    Initials Name Provider Type    MELISSA Aleman, PT Physical Therapist    KAR Mullen, OT Occupational Therapist    ELÍAS Petersen, LPN Licensed Nurse          Physical Therapy Education     Title: PT OT SLP Therapies (Done)     Topic: Physical Therapy (Done)     Point: Mobility training (Done)    Learning Progress Summary    Learner Readiness Method Response Comment Documented by Status   Patient Acceptance E VU,NR BENEFITS OF OOB ACTIVITY, SAFETY WITH MOBILITY, POSITIONING, D/C PLANNING. CD 03/07/17 1007 Done               Point: Home exercise program (Done)    Learning Progress Summary    Learner Readiness Method Response Comment Documented by Status   Patient Acceptance E VU,NR BENEFITS OF OOB ACTIVITY, SAFETY WITH MOBILITY, POSITIONING, D/C PLANNING. CD 03/07/17 1007 Done               Point: Body mechanics (Done)    Learning Progress Summary    Learner Readiness Method  Response Comment Documented by Status   Patient Acceptance E VU,NR BENEFITS OF OOB ACTIVITY, SAFETY WITH MOBILITY, POSITIONING, D/C PLANNING.  03/07/17 1007 Done               Point: Precautions (Done)    Learning Progress Summary    Learner Readiness Method Response Comment Documented by Status   Patient Acceptance E VU,NR BENEFITS OF OOB ACTIVITY, SAFETY WITH MOBILITY, POSITIONING, D/C PLANNING.  03/07/17 1007 Done                      User Key     Initials Effective Dates Name Provider Type Discipline     06/19/15 -  Sheri Aleman, PT Physical Therapist PT                PT Recommendation and Plan  Anticipated Discharge Disposition: skilled nursing facility  PT Frequency: daily  Plan of Care Review  Plan Of Care Reviewed With: patient, daughter  Outcome Summary/Follow up Plan: PT PRESENTS WITH DECLINE IN FUNCTIONAL MOBILITY. WAS AMBULATING PTA WITH R WALKER AND CGA  X 600 FEET. CURRENTLY REQUIRES MAX ASSIST OF 2 TO AMBULATE 6 FEET. PT WITH R SIDED WEAKNESS, HYPERTONICITY  RLE >L LE. PT CONFUSED AND FOLLOWING COMMANDS 50%. WILL NEED SNF FOR REHAB AT D/C PRIOR TO HOME.           IP PT Goals       03/07/17 1008          Bed Mobility PT LTG    Bed Mobility PT LTG, Time to Achieve 2 wks  -CD      Bed Mobility PT LTG, Activity Type all bed mobility  -CD      Bed Mobility PT LTG, [place] Level minimum assist (75% patient effort)  -CD      Transfer Training PT LTG    Transfer Training PT LTG, Time to Achieve 2 wks  -CD      Transfer Training PT LTG, Activity Type all transfers  -CD      Transfer Training PT LTG, [place] Level minimum assist (75% patient effort)  -CD      Transfer Training PT LTG, Assist Device walker, rolling  -CD      Gait Training PT LTG    Gait Training Goal PT LTG, Time to Achieve 2 wks  -CD      Gait Training Goal PT LTG, [place] Level moderate assist (50% patient effort)  -CD      Gait Training Goal PT LTG, Assist Device walker, rolling  -CD      Gait Training Goal PT LTG,  Distance to Achieve 150  -CD        User Key  (r) = Recorded By, (t) = Taken By, (c) = Cosigned By    Initials Name Provider Type    MELISSA Aleman PT Physical Therapist                Outcome Measures       03/07/17 0822          How much help from another person do you currently need...    Turning from your back to your side while in flat bed without using bedrails? 2  -CD      Moving from lying on back to sitting on the side of a flat bed without bedrails? 2  -CD      Moving to and from a bed to a chair (including a wheelchair)? 2  -CD      Standing up from a chair using your arms (e.g., wheelchair, bedside chair)? 2  -CD      Climbing 3-5 steps with a railing? 1  -CD      To walk in hospital room? 2  -CD      AM-PAC 6 Clicks Score 11  -CD      Functional Assessment    Outcome Measure Options AM-PAC 6 Clicks Basic Mobility (PT)  -CD        User Key  (r) = Recorded By, (t) = Taken By, (c) = Cosigned By    Initials Name Provider Type    MELISSA Aleman PT Physical Therapist           Time Calculation:         PT Charges       03/07/17 1013          Time Calculation    PT Received On 03/07/17  -      PT Goal Re-Cert Due Date 03/17/17  -      Time Calculation- PT    Total Timed Code Minutes- PT 10 minute(s)  -CD        User Key  (r) = Recorded By, (t) = Taken By, (c) = Cosigned By    Initials Name Provider Type    MELISSA Aleman PT Physical Therapist          Therapy Charges for Today     Code Description Service Date Service Provider Modifiers Qty    04209451900 HC PT EVAL MOD COMPLEXITY 4 3/7/2017 Sheri Aleman, PT GP 1    98371079061 HC PT THER PROC EA 15 MIN 3/7/2017 Sheri Aleman, PT GP 1          PT G-Codes  Outcome Measure Options: AM-PAC 6 Clicks Basic Mobility (PT)      Sheri Aleman PT  3/7/2017          Electronically signed by Sheri Aleman PT at 3/7/2017 10:15 AM           Occupational Therapy Notes (most recent note)      Shauna Mullen, OT at 3/7/2017 10:23 AM  Version 1 of 1          Acute Care - Occupational Therapy Initial Evaluation  [facility]     Patient Name: Loni Walker  : 1940  MRN: [id number]  Today's Date: 3/7/2017  Onset of Illness/Injury or Date of Surgery Date: 17  Date of Referral to OT: 17  Referring Physician: GABRIELLE FOUNTAIN    Admit Date: 3/6/2017       ICD-10-CM ICD-9-CM   1. Altered mental status, unspecified R41.82 780.97   2. Weakness R53.1 780.79   3. FTT (failure to thrive) in adult R62.7 783.7   4. Impaired mobility and ADLs Z74.09 799.89   5. Impaired functional mobility, balance, gait, and endurance Z74.09 V49.89     Patient Active Problem List   Diagnosis   • Hypertension   • CVA (cerebral vascular accident)   • Carotid stenosis   • Constipation   • Cerebrovascular accident (CVA)   • Altered mental status, unspecified     Past Medical History   Diagnosis Date   • Arthritis    • Carotid stenosis    • Cataracts, bilateral    • Coronary artery disease    • CVA (cerebral vascular accident)    • Hx of migraines    • Hypertension      Past Surgical History   Procedure Laterality Date   • Cholecystectomy     • Hysterectomy     • Abdominal surgery            OT ASSESSMENT FLOWSHEET (last 72 hours)      OT Evaluation       17 0822 17 0821 17 1841          Rehab Evaluation    Document Type evaluation  -CD evaluation  -AN       Subjective Information no complaints;agree to therapy  -CD no complaints;agree to therapy  -AN       Symptoms Noted During/After Treatment --   Noted eyes to be red, watering and glassy- nsg notified.   -CD        General Information    Patient Profile Review yes  -CD yes  -AN       Onset of Illness/Injury or Date of Surgery Date 17  -CD 17  -AN       Referring Physician GABRIELLE FOUNTAIN  -CD GABRIELLE Fountain  -AN       General Observations PT SUPINE UPON ARRIVAL ON RA. NSG IN GIVING MEDS. DTR PRESENT.   -CD Pt supine in bed getting meds from nursing, Daughter present  -AN       Pertinent History Of Current  Problem Pt. admitted with speech deficits, R sided weakness, AMS. Pt home 2 wks from SNF rehab following stroke rehab from September..Daughter reports progressive decline, fall, increase residual R weakness. MRI, CT (-) for acute injury.  -CD Pt. admitted with speech deficits, R sided weakness, AMS. Pt home 2 wks from SNF rehab following stroke rehab from September..Daughter reports progressive decline, fall, increase residual R weakness. MRI, CT (-) for acute injury.  -AN       Precautions/Limitations fall precautions   CONFUSION. NEEDS EXIT ALARM.   -CD fall precautions   exit alarm; confusion  -AN       Prior Level of Function independent:;feeding;min assist:;all household mobility;bed mobility;transfer;gait  -CD independent:;feeding;min assist:;all household mobility;transfer;bed mobility;grooming;dressing;bathing;dependent:;home management;driving   daughter arranges meds  -AN       Equipment Currently Used at Home wheelchair;shower chair;walker, rolling  -CD wheelchair;shower chair  -AN shower chair;wheelchair  -EB      Plans/Goals Discussed With patient and family;agreed upon  -CD patient and family;agreed upon  -AN       Risks Reviewed patient and family:;LOB;dizziness;increased discomfort;change in vital signs  -CD patient and family:;LOB;dizziness;increased discomfort;change in vital signs  -AN       Benefits Reviewed patient and family:;improve function;increase independence;increase strength;increase balance;increase knowledge  -CD improve function;patient and family:;increase independence;increase strength;increase balance  -AN       Barriers to Rehab previous functional deficit;cognitive status;medically complex  -CD cognitive status;previous functional deficit;medically complex  -AN       Living Environment    Lives With child(rolando), adult   DAUGHTER  -CD child(rolando), adult   daugher  -AN child(rolando), adult  -EB      Living Arrangements house  -CD house  -AN house  -EB      Home Accessibility bed and  bath on same level  -CD        Stair Railings at Home none  -CD none  -AN none  -EB      Type of Financial/Environmental Concern   none  -EB      Transportation Available   car  -EB      Living Environment Comment LIVES WITH DTR WHO ASSISTS WITH CARE.   -CD  DTR LIVES WITH PT  -EB      Clinical Impression    Date of Referral to OT  03/06/17  -AN       OT Diagnosis  Decreased ADL I  -AN       Impairments Found (describe specific impairments)  gait, locomotion, and balance;motor function;muscle performance  -AN       Patient/Family Goals Statement  Pt and family agreed to OT; daughter reports she does not know if she can continue to care for pt 24/7 daily  -AN       Criteria for Skilled Therapeutic Interventions Met  yes;treatment indicated  -AN       Rehab Potential  good, to achieve stated therapy goals  -AN       Therapy Frequency  daily  -AN       Anticipated Equipment Needs At Discharge  --   TBD  -AN       Anticipated Discharge Disposition  skilled nursing facility  -AN       Functional Level Prior    Ambulation   3-->assistive equipment and person  -EB      Transferring   3-->assistive equipment and person  -EB      Toileting   4-->completely dependent  -EB      Bathing   2-->assistive person  -EB      Dressing   2-->assistive person  -EB      Eating   2-->assistive person  -EB      Communication   0-->understands/communicates without difficulty  -EB      Swallowing   0-->swallows foods/liquids without difficulty  -EB      Prior Functional Level Comment   16  -EB      Vital Signs    Pre Systolic BP Rehab --   PER O.T.   -  -AN       Pre Treatment Diastolic BP  67  -AN       Intra Systolic BP Rehab  177  -AN       Intra Treatment Diastolic BP  91  -AN       Post Systolic BP Rehab  155  -AN       Post Treatment Diastolic BP  82  -AN       Pretreatment Heart Rate (beats/min)  118  -AN       Intratreatment Heart Rate (beats/min)  120  -AN       Posttreatment Heart Rate (beats/min)  93  -AN       Pre Patient  Position  Supine  -AN       Intra Patient Position  Sitting  -AN       Post Patient Position  Sitting  -AN       Pain Assessment    Pain Assessment Pierce-Vieyra FACES  -CD Pierce-Baker FACES  -AN       Pierce-Vieyra FACES Pain Rating 2  -CD 2  -AN       Pain Location Leg  -CD Leg  -AN       Pain Orientation Right  -CD Right  -AN       Vision Assessment/Intervention    Visual Impairment unable/difficult to assess   PT WEARS GLASSES.   -CD unable/difficult to assess   wears glasses  -AN       Cognitive Assessment/Intervention    Current Cognitive/Communication Assessment impaired  -CD impaired  -AN       Orientation Status oriented to;person  -CD oriented to;person  -AN       Follows Commands/Answers Questions 50% of the time;needs cueing;needs increased time;needs repetition  -CD 50% of the time;needs repetition;needs increased time;needs cueing;75% of the time  -AN       Personal Safety moderate impairment;decreased awareness, need for assist;decreased awareness, need for safety;decreased insight to deficits  -CD moderate impairment;decreased insight to deficits  -AN       Personal Safety Interventions fall prevention program maintained;elopement precautions initiated;gait belt;muscle strengthening facilitated;nonskid shoes/slippers when out of bed;supervised activity  -CD fall prevention program maintained  -AN       Short/Long Term Memory moderate impairment, short term memory  -CD moderate impairment, short term memory  -AN       ROM (Range of Motion)    General ROM lower extremity range of motion deficits identified   HYPERTONICITY- REQUIRES AAROM FOR FULL AVAILABLE RANGE.   -CD upper extremity range of motion deficits identified  -AN       General ROM Detail  shoulder flex AROM R to 90,AAROM over 140; L UE and R elbow, wrist, hand WFL; cues to flex R hand  -AN       MMT (Manual Muscle Testing)    General MMT Assessment lower extremity strength deficits identified   UNABLE TO FOLLOW COMMANDS FOR MMT- GROSSLY 2-3/5 R,  3-4/5 L.  -CD        General MMT Assessment Detail  --   Difficult to test with cognitive deficits; R hand shoulder 4  -AN       Muscle Tone Assessment    Muscle Tone Assessment Bilateral Lower Extremities   hypertonic, R >L.   -CD        Bed Mobility, Assessment/Treatment    Bed Mobility, Assistive Device head of bed elevated  -CD head of bed elevated  -AN       Bed Mobility, Scoot/Bridge, [place] moderate assist (50% patient effort);verbal cues required  -CD moderate assist (50% patient effort)  -AN       Bed Mob, Supine to Sit, [place]  moderate assist (50% patient effort);2 person assist required  -AN       Transfer Assessment/Treatment    Transfers, Sit-Stand [place] moderate assist (50% patient effort);2 person assist required;verbal cues required  -CD moderate assist (50% patient effort);2 person assist required;verbal cues required  -AN       Transfers, Stand-Sit [place] moderate assist (50% patient effort);2 person assist required;verbal cues required  -CD moderate assist (50% patient effort);2 person assist required;verbal cues required  -AN       Transfers, Sit-Stand-Sit, Assist Device rolling walker  -CD        Toilet Transfer, [place] moderate assist (50% patient effort);2 person assist required;verbal cues required  -CD moderate assist (50% patient effort);2 person assist required  -AN       Toilet Transfer, Assistive Device bedside commode without drop arms  -CD bedside commode without drop arms  -AN       Transfer, Safety Issues sequencing ability decreased;step length decreased;weight-shifting ability decreased;balance decreased during turns  -CD sequencing ability decreased;balance decreased during turns;step length decreased;weight-shifting ability decreased  -AN       Transfer, Impairments ROM decreased;strength decreased;impaired balance;coordination impaired  -CD ROM decreased;decreased flexibility;strength decreased;impaired balance;coordination impaired  -AN        Functional Mobility    Functional Mobility- Ind. Level  moderate assist (50% patient effort);2 person assist required  -AN       Functional Mobility-Distance (Feet)  5  -AN       Functional Mobility- Safety Issues  sequencing ability decreased;balance decreased during turns;weight-shifting ability decreased;loses balance backward  -AN       Upper Body Dressing Assessment/Training    UB Dressing Assess/Train, Clothing Type  donning:;hospital gown  -AN       UB Dressing Assess/Train, Position  sitting  -AN       UB Dressing Assess/Train, [place]  moderate assist (50% patient effort)  -AN       UB Dressing Assess/Train, Impairments  ROM decreased;strength decreased;impaired balance  -AN       Lower Body Dressing Assessment/Training    LB Dressing Assess/Train, Clothing Type  donning:;socks  -AN       LB Dressing Assess/Train, Position  sitting  -AN       LB Dressing Assess/Train, [place]  maximum assist (25% patient effort)  -AN       Toileting Assessment/Training    Toileting Assess/Train, Assistive Device  bedside commode  -AN       Toileting Assess/Train, Position  sitting  -AN       Toileting Assess/Train, Comment  max assist hygiene, clothing mgmt  -AN       Grooming Assessment/Training    Grooming Assess/Train, Position  sitting  -AN       Grooming Assess/Train, Indepen Level  verbal cues required;moderate assist (50% patient effort)  -AN       Grooming Assess/Train, Comment  comb hair  -AN       Motor Skills/Interventions    Additional Documentation  Balance Skills Training (Group);Fine Motor Coordination Training (Group);Gross Motor Coordination Training (Group)  -AN       Balance Skills Training    Sitting-Level of Assistance  Close supervision  -AN       Sitting-Balance Support  Left upper extremity supported  -AN       Sitting-Balance Activities  Forward lean;Reaching for objects  -AN       Sitting # of Minutes  8  -AN       Standing-Level of Assistance  Moderate assistance;x2  -AN       Static  Standing Balance Support  Right upper extremity supported;Left upper extremity supported  -AN       Standing-Balance Activities  Weight Shift R-L  -AN       Standing Balance # of Minutes  1  -AN       Gross Motor Coordination Training    Gross Motor Skill, Impairments Detail  impaired  -AN       Fine Motor Coordination Training    Opposition  Right:;Left:;impaired  -AN       Positioning and Restraints    Pre-Treatment Position in bed  -CD in bed  -AN       Post Treatment Position chair  -CD chair  -AN       In Chair reclined;call light within reach;notified nsg;encouraged to call for assist;exit alarm on;with family/caregiver;legs elevated;RUE elevated;LUE elevated  -CD notified nsg;reclined;call light within reach;encouraged to call for assist;exit alarm on;with family/caregiver  -AN         User Key  (r) = Recorded By, (t) = Taken By, (c) = Cosigned By    Initials Name Effective Dates    CD Sheri Aleman, PT 06/19/15 -     KAR Mullen, OT 06/22/15 -     ELÍAS Petersen, LPN 03/14/16 -            Occupational Therapy Education     Title: PT OT SLP Therapies (Done)     Topic: Occupational Therapy (Done)     Point: ADL training (Done)    Description: Instruct learner(s) on proper safety adaptation and remediation techniques during self care or transfers.   Instruct in proper use of assistive devices.    Learning Progress Summary    Learner Readiness Method Response Comment Documented by Status   Patient Acceptance E VU,NR Educated pt and fm on POC, need for assist with ADl's at this time. AN 03/07/17 1018 Done   Family Acceptance E VU,NR Educated pt and fm on POC, need for assist with ADl's at this time. AN 03/07/17 1018 Done               Point: Home exercise program (Done)    Description: Instruct learner(s) on appropriate technique for monitoring, assisting and/or progressing therapeutic exercises/activities.    Learning Progress Summary    Learner Readiness Method Response Comment Documented by Status    Patient Acceptance E VU,NR Educated pt and fm on POC, need for assist with ADl's at this time. AN 03/07/17 1018 Done   Family Acceptance E VU,NR Educated pt and fm on POC, need for assist with ADl's at this time. AN 03/07/17 1018 Done                      User Key     Initials Effective Dates Name Provider Type Discipline    AN 06/22/15 -  Shauna Mullen, OT Occupational Therapist OT                  OT Recommendation and Plan  Anticipated Equipment Needs At Discharge:  (TBD)  Anticipated Discharge Disposition: skilled nursing facility  Therapy Frequency: daily  Plan of Care Review  Plan Of Care Reviewed With: patient, daughter  Outcome Summary/Follow up Plan: Continue OT to address deficits with functional ROM/coordination, ADL I and balance/txfrs. Recommend SNF at discharge and long tern planning due to daughter's full responsibility for pt.          OT Goals       03/07/17 1019          Bed Mobility OT LTG    Bed Mobility OT LTG, Date Established 03/07/17  -AN      Bed Mobility OT LTG, Time to Achieve 1 wk  -AN      Bed Mobility OT LTG, Activity Type supine to sit/sit to supine  -AN      Bed Mobility OT LTG, [place] Level minimum assist (75% patient effort)  -AN      Transfer Training 2 OT STG    Transfer Training 2 OT STG, Date Established 03/07/17  -AN      Transfer Training 2 OT STG, Time to Achieve 1 wk  -AN      Transfer Training 2 OT STG, Activity Type toilet  -AN      Transfer Training 2 OT STG, [place] Level minimum assist (75% patient effort)  -AN      Toileting OT LTG    Toileting Goal OT LTG, Date Established 03/07/17  -AN      Toileting Goal OT LTG, Time to Achieve 1 wk  -AN      Toileting Goal OT LTG, [place] Level minimum assist (75% patient effort)  -AN      Coordination OT LTG    Coordination OT LTG, Date Established 03/07/17  -AN      Coordination OT LTG, Time to Achieve 1 wk  -AN      Coordination OT LTG, Activity Type FM task;GM task  -AN      Coordination OT LTG, [place]  Level standby assist  -AN        User Key  (r) = Recorded By, (t) = Taken By, (c) = Cosigned By    Initials Name Provider Type    AN Shauna Mullen OT Occupational Therapist                Outcome Measures       03/07/17 0822 03/07/17 0821       How much help from another person do you currently need...    Turning from your back to your side while in flat bed without using bedrails? 2  -CD      Moving from lying on back to sitting on the side of a flat bed without bedrails? 2  -CD      Moving to and from a bed to a chair (including a wheelchair)? 2  -CD      Standing up from a chair using your arms (e.g., wheelchair, bedside chair)? 2  -CD      Climbing 3-5 steps with a railing? 1  -CD      To walk in hospital room? 2  -CD      AM-PAC 6 Clicks Score 11  -CD      How much help from another is currently needed...    Putting on and taking off regular lower body clothing?  2  -AN     Bathing (including washing, rinsing, and drying)  2  -AN     Toileting (which includes using toilet bed pan or urinal)  2  -AN     Putting on and taking off regular upper body clothing  2  -AN     Taking care of personal grooming (such as brushing teeth)  2  -AN     Eating meals  2  -AN     Score  12  -AN     Modified Hanson Scale    Modified Hanson Scale  4 - Moderately severe disability.  Unable to walk without assistance, and unable to attend to own bodily needs without assistance.  -AN     Functional Assessment    Outcome Measure Options AM-PAC 6 Clicks Basic Mobility (PT)  -CD AM-PAC 6 Clicks Daily Activity (OT);Modified Hanson  -AN       User Key  (r) = Recorded By, (t) = Taken By, (c) = Cosigned By    Initials Name Provider Type    MELISSA Aleman, PT Physical Therapist    KAR Mullen OT Occupational Therapist          Time Calculation:   OT Start Time: 0821    Therapy Charges for Today     Code Description Service Date Service Provider Modifiers Qty    96512714416  OT SELFCARE CURRENT 3/7/2017 Shauna Mullen OT GO, CL 1     84887739763 HC OT SELFCARE PROJECTED 3/7/2017 Shauna Mullen OT GO, CK 1    31660856752 HC OT THER SUPP EA 15 MIN 3/7/2017 Shauna Mullen OT GO 1    05839813489 HC OT THERAPEUTIC ACT EA 15 MIN 3/7/2017 Shauna Mullen OT GO 1    45660353148 HC OT EVAL MOD COMPLEXITY 4 3/7/2017 Shauna Mullen OT GO 1          OT G-codes  OT Functional Scales Options: AM-PAC 6 Clicks Daily Activity (OT)  Score: 12  Functional Limitation: Self care  Self Care Current Status (): At least 60 percent but less than 80 percent impaired, limited or restricted  Self Care Goal Status (): At least 40 percent but less than 60 percent impaired, limited or restricted    Shauna Mullen OT  3/7/2017     Electronically signed by Shauna Mullen OT at 3/7/2017 10:24 AM

## 2017-03-07 NOTE — PLAN OF CARE
Problem: Patient Care Overview (Adult)  Goal: Plan of Care Review  Outcome: Ongoing (interventions implemented as appropriate)    03/07/17 1019   Coping/Psychosocial Response Interventions   Plan Of Care Reviewed With patient;daughter   Outcome Evaluation   Outcome Summary/Follow up Plan Continue OT to address deficits with functional ROM/coordination, ADL I and balance/txfrs. Recommend SNF at discharge and long tern planning due to daughter's full responsibility for pt.         Problem: Inpatient Occupational Therapy  Goal: Bed Mobility Goal LTG- OT  Outcome: Ongoing (interventions implemented as appropriate)    03/07/17 1019   Bed Mobility OT LTG   Bed Mobility OT LTG, Date Established 03/07/17   Bed Mobility OT LTG, Time to Achieve 1 wk   Bed Mobility OT LTG, Activity Type supine to sit/sit to supine   Bed Mobility OT LTG, [place] Level minimum assist (75% patient effort)       Goal: Transfer Training Goal 2 STG- OT  Outcome: Ongoing (interventions implemented as appropriate)    03/07/17 1019   Transfer Training 2 OT STG   Transfer Training 2 OT STG, Date Established 03/07/17   Transfer Training 2 OT STG, Time to Achieve 1 wk   Transfer Training 2 OT STG, Activity Type toilet   Transfer Training 2 OT STG, [place] Level minimum assist (75% patient effort)       Goal: Toileting Goal LTG- OT  Outcome: Ongoing (interventions implemented as appropriate)    03/07/17 1019   Toileting OT LTG   Toileting Goal OT LTG, Date Established 03/07/17   Toileting Goal OT LTG, Time to Achieve 1 wk   Toileting Goal OT LTG, [place] Level minimum assist (75% patient effort)       Goal: Coordination Goal LTG- OT  Outcome: Ongoing (interventions implemented as appropriate)    03/07/17 1019   Coordination OT LTG   Coordination OT LTG, Date Established 03/07/17   Coordination OT LTG, Time to Achieve 1 wk   Coordination OT LTG, Activity Type FM task;GM task   Coordination OT LTG, [place] Level standby assist

## 2017-03-07 NOTE — PLAN OF CARE
Problem: Patient Care Overview (Adult)  Goal: Plan of Care Review  Outcome: Ongoing (interventions implemented as appropriate)    03/07/17 1009   Coping/Psychosocial Response Interventions   Plan Of Care Reviewed With patient;daughter   Outcome Evaluation   Outcome Summary/Follow up Plan PT PRESENTS WITH DECLINE IN FUNCTIONAL MOBILITY. WAS AMBULATING PTA WITH R WALKER AND CGA X 600 FEET. CURRENTLY REQUIRES MAX ASSIST OF 2 TO AMBULATE 6 FEET. PT WITH R SIDED WEAKNESS, HYPERTONICITY RLE >L LE. PT CONFUSED AND FOLLOWING COMMANDS 50%. WILL NEED SNF FOR REHAB AT D/C PRIOR TO HOME.

## 2017-03-07 NOTE — PROGRESS NOTES
Acute Care - Physical Therapy Initial Evaluation  [facility]     Patient Name: Loni Walker  : 1940  MRN: [id number]  Today's Date: 3/7/2017   Onset of Illness/Injury or Date of Surgery Date: 17  Date of Referral to PT: 17  Referring Physician: GABRIELLE FOUNTAIN      Admit Date: 3/6/2017     Visit Dx:    ICD-10-CM ICD-9-CM   1. Altered mental status, unspecified R41.82 780.97   2. Weakness R53.1 780.79   3. FTT (failure to thrive) in adult R62.7 783.7   4. Impaired mobility and ADLs Z74.09 799.89   5. Impaired functional mobility, balance, gait, and endurance Z74.09 V49.89     Patient Active Problem List   Diagnosis   • Hypertension   • CVA (cerebral vascular accident)   • Carotid stenosis   • Constipation   • Cerebrovascular accident (CVA)   • Altered mental status, unspecified     Past Medical History   Diagnosis Date   • Arthritis    • Carotid stenosis    • Cataracts, bilateral    • Coronary artery disease    • CVA (cerebral vascular accident)    • Hx of migraines    • Hypertension      Past Surgical History   Procedure Laterality Date   • Cholecystectomy     • Hysterectomy     • Abdominal surgery            PT ASSESSMENT (last 72 hours)      PT Evaluation       17 0822 17 0821    Rehab Evaluation    Document Type evaluation  -CD evaluation  -AN    Subjective Information no complaints;agree to therapy  -CD no complaints;agree to therapy  -AN    Symptoms Noted During/After Treatment --   Noted eyes to be red, watering and glassy- nsg notified.   -CD     General Information    Patient Profile Review yes  -CD yes  -AN    Onset of Illness/Injury or Date of Surgery Date 17  -CD 17  -AN    Referring Physician GABRIELLE FOUNTAIN  -CD GABRIELLE Fountain  -AN    General Observations PT SUPINE UPON ARRIVAL ON RA. NSG IN GIVING MEDS. DTR PRESENT.   -CD Pt supine in bed getting meds from nursing, Daughter present  -AN    Pertinent History Of Current Problem Pt. admitted with speech deficits, R sided  weakness, AMS. Pt home 2 wks from SNF rehab following stroke rehab from September..Daughter reports progressive decline, fall, increase residual R weakness. MRI, CT (-) for acute injury.  -CD Pt. admitted with speech deficits, R sided weakness, AMS. Pt home 2 wks from SNF rehab following stroke rehab from September..Daughter reports progressive decline, fall, increase residual R weakness. MRI, CT (-) for acute injury.  -AN    Precautions/Limitations fall precautions   CONFUSION. NEEDS EXIT ALARM.   -CD fall precautions   exit alarm; confusion  -AN    Prior Level of Function independent:;feeding;min assist:;all household mobility;bed mobility;transfer;gait  -CD independent:;feeding;min assist:;all household mobility;transfer;bed mobility;grooming;dressing;bathing;dependent:;home management;driving   daughter arranges meds  -AN    Equipment Currently Used at Home wheelchair;shower chair;walker, rolling  -CD wheelchair;shower chair  -AN    Plans/Goals Discussed With patient and family;agreed upon  -CD patient and family;agreed upon  -AN    Risks Reviewed patient and family:;LOB;dizziness;increased discomfort;change in vital signs  -CD patient and family:;LOB;dizziness;increased discomfort;change in vital signs  -AN    Benefits Reviewed patient and family:;improve function;increase independence;increase strength;increase balance;increase knowledge  -CD improve function;patient and family:;increase independence;increase strength;increase balance  -AN    Barriers to Rehab previous functional deficit;cognitive status;medically complex  -CD cognitive status;previous functional deficit;medically complex  -AN    Living Environment    Lives With child(rolando), adult   DAUGHTER  -CD child(rolando), adult   daugher  -AN    Living Arrangements house  -CD house  -AN    Home Accessibility bed and bath on same level  -CD     Stair Railings at Home none  -CD none  -AN    Living Environment Comment LIVES WITH DTR WHO ASSISTS WITH CARE.   -CD      Clinical Impression    Date of Referral to PT 03/06/17  -CD     PT Diagnosis IMPAIRED FUNCTIONAL MOIBLITY.   -CD     Patient/Family Goals Statement DID NOT STATE.   -CD     Criteria for Skilled Therapeutic Interventions Met yes  -CD     Rehab Potential good, to achieve stated therapy goals  -CD     Vital Signs    Pre Systolic BP Rehab --   PER O.T.   -  -AN    Pre Treatment Diastolic BP  67  -AN    Intra Systolic BP Rehab  177  -AN    Intra Treatment Diastolic BP  91  -AN    Post Systolic BP Rehab  155  -AN    Post Treatment Diastolic BP  82  -AN    Pretreatment Heart Rate (beats/min)  118  -AN    Intratreatment Heart Rate (beats/min)  120  -AN    Posttreatment Heart Rate (beats/min)  93  -AN    Pre Patient Position  Supine  -AN    Intra Patient Position  Sitting  -AN    Post Patient Position  Sitting  -AN    Pain Assessment    Pain Assessment Pierce-Vieyra FACES  -CD Pierce-Baker FACES  -AN    Pierce-Vieyra FACES Pain Rating 2  -CD 2  -AN    Pain Location Leg  -CD Leg  -AN    Pain Orientation Right  -CD Right  -AN    Vision Assessment/Intervention    Visual Impairment unable/difficult to assess   PT WEARS GLASSES.   -CD unable/difficult to assess   wears glasses  -AN    Cognitive Assessment/Intervention    Current Cognitive/Communication Assessment impaired  -CD impaired  -AN    Orientation Status oriented to;person  -CD oriented to;person  -AN    Follows Commands/Answers Questions 50% of the time;needs cueing;needs increased time;needs repetition  -CD 50% of the time;needs repetition;needs increased time;needs cueing;75% of the time  -AN    Personal Safety moderate impairment;decreased awareness, need for assist;decreased awareness, need for safety;decreased insight to deficits  -CD moderate impairment;decreased insight to deficits  -AN    Personal Safety Interventions fall prevention program maintained;elopement precautions initiated;gait belt;muscle strengthening facilitated;nonskid shoes/slippers when out of  bed;supervised activity  -CD fall prevention program maintained  -AN    Short/Long Term Memory moderate impairment, short term memory  -CD moderate impairment, short term memory  -AN    ROM (Range of Motion)    General ROM lower extremity range of motion deficits identified   HYPERTONICITY- REQUIRES AAROM FOR FULL AVAILABLE RANGE.   -CD upper extremity range of motion deficits identified  -AN    General ROM Detail  shoulder flex AROM R to 90,AAROM over 140; L UE and R elbow, wrist, hand WFL; cues to flex R hand  -AN    MMT (Manual Muscle Testing)    General MMT Assessment lower extremity strength deficits identified   UNABLE TO FOLLOW COMMANDS FOR MMT- GROSSLY 2-3/5 R, 3-4/5 L.  -CD     General MMT Assessment Detail  --   Difficult to test with cognitive deficits; R hand shoulder 4  -AN    Muscle Tone Assessment    Muscle Tone Assessment Bilateral Lower Extremities   hypertonic, R >L.   -CD     Bed Mobility, Assessment/Treatment    Bed Mobility, Assistive Device head of bed elevated  -CD head of bed elevated  -AN    Bed Mobility, Scoot/Bridge, [place] moderate assist (50% patient effort);verbal cues required  -CD moderate assist (50% patient effort)  -AN    Bed Mob, Supine to Sit, [place]  moderate assist (50% patient effort);2 person assist required  -AN    Transfer Assessment/Treatment    Transfers, Sit-Stand [place] moderate assist (50% patient effort);2 person assist required;verbal cues required  -CD moderate assist (50% patient effort);2 person assist required;verbal cues required  -AN    Transfers, Stand-Sit [place] moderate assist (50% patient effort);2 person assist required;verbal cues required  -CD moderate assist (50% patient effort);2 person assist required;verbal cues required  -AN    Transfers, Sit-Stand-Sit, Assist Device rolling walker  -CD     Toilet Transfer, [place] moderate assist (50% patient effort);2 person assist required;verbal cues required  -CD moderate assist  (50% patient effort);2 person assist required  -AN    Toilet Transfer, Assistive Device bedside commode without drop arms  -CD bedside commode without drop arms  -AN    Transfer, Safety Issues sequencing ability decreased;step length decreased;weight-shifting ability decreased;balance decreased during turns  -CD sequencing ability decreased;balance decreased during turns;step length decreased;weight-shifting ability decreased  -AN    Transfer, Impairments ROM decreased;strength decreased;impaired balance;coordination impaired  -CD ROM decreased;decreased flexibility;strength decreased;impaired balance;coordination impaired  -AN    Gait Assessment/Treatment    Gait, [place] Level maximum assist (25% patient effort);2 person assist required  -CD     Gait, Assistive Device rolling walker  -CD     Gait, Distance (Feet) 6  -CD     Gait, Gait Deviations leans to the right;step length decreased;alan decreased   R LE ADDUCTED AND INVERTED WITH EXTENSOR TONE.   -CD     Gait, Impairments strength decreased;impaired balance;coordination impaired;ROM decreased;muscle tone abnormal  -CD     Motor Skills/Interventions    Additional Documentation  Balance Skills Training (Group);Fine Motor Coordination Training (Group);Gross Motor Coordination Training (Group)  -AN    Balance Skills Training    Sitting-Level of Assistance  Close supervision  -AN    Sitting-Balance Support  Left upper extremity supported  -AN    Sitting-Balance Activities  Forward lean;Reaching for objects  -AN    Sitting # of Minutes  8  -AN    Standing-Level of Assistance  Moderate assistance;x2  -AN    Static Standing Balance Support  Right upper extremity supported;Left upper extremity supported  -AN    Standing-Balance Activities  Weight Shift R-L  -AN    Standing Balance # of Minutes  1  -AN    Fine Motor Coordination Training    Opposition  Right:;Left:;impaired  -AN    Gross Motor Coordination Training    Gross Motor Skill, Impairments Detail   impaired  -AN    Positioning and Restraints    Pre-Treatment Position in bed  -CD in bed  -AN    Post Treatment Position chair  -CD chair  -AN    In Chair reclined;call light within reach;notified nsg;encouraged to call for assist;exit alarm on;with family/caregiver;legs elevated;RUE elevated;LUE elevated  -CD notified nsg;reclined;call light within reach;encouraged to call for assist;exit alarm on;with family/caregiver  -AN      03/06/17 1841       General Information    Equipment Currently Used at Home shower chair;wheelchair  -EB     Living Environment    Lives With child(rolando), adult  -EB     Living Arrangements house  -EB     Stair Railings at Home none  -EB     Type of Financial/Environmental Concern none  -EB     Transportation Available car  -EB     Living Environment Comment DTR LIVES WITH PT  -EB       User Key  (r) = Recorded By, (t) = Taken By, (c) = Cosigned By    Initials Name Provider Type    MELISSA Aleman, PT Physical Therapist    KAR Mullen, OT Occupational Therapist    ELÍAS Petersen, LPN Licensed Nurse          Physical Therapy Education     Title: PT OT SLP Therapies (Done)     Topic: Physical Therapy (Done)     Point: Mobility training (Done)    Learning Progress Summary    Learner Readiness Method Response Comment Documented by Status   Patient Acceptance E VU,NR BENEFITS OF OOB ACTIVITY, SAFETY WITH MOBILITY, POSITIONING, D/C PLANNING.  03/07/17 1007 Done               Point: Home exercise program (Done)    Learning Progress Summary    Learner Readiness Method Response Comment Documented by Status   Patient Acceptance E VU,NR BENEFITS OF OOB ACTIVITY, SAFETY WITH MOBILITY, POSITIONING, D/C PLANNING.  03/07/17 1007 Done               Point: Body mechanics (Done)    Learning Progress Summary    Learner Readiness Method Response Comment Documented by Status   Patient Acceptance E VU,NR BENEFITS OF OOB ACTIVITY, SAFETY WITH MOBILITY, POSITIONING, D/C PLANNING.  03/07/17 1007  Done               Point: Precautions (Done)    Learning Progress Summary    Learner Readiness Method Response Comment Documented by Status   Patient Acceptance E VU,NR BENEFITS OF OOB ACTIVITY, SAFETY WITH MOBILITY, POSITIONING, D/C PLANNING.  03/07/17 1007 Done                      User Key     Initials Effective Dates Name Provider Type Discipline     06/19/15 -  Sheri Aleman, PT Physical Therapist PT                PT Recommendation and Plan  Anticipated Discharge Disposition: skilled nursing facility  PT Frequency: daily  Plan of Care Review  Plan Of Care Reviewed With: patient, daughter  Outcome Summary/Follow up Plan: PT PRESENTS WITH DECLINE IN FUNCTIONAL MOBILITY. WAS AMBULATING PTA WITH R WALKER AND CGA  X 600 FEET. CURRENTLY REQUIRES MAX ASSIST OF 2 TO AMBULATE 6 FEET. PT WITH R SIDED WEAKNESS, HYPERTONICITY  RLE >L LE. PT CONFUSED AND FOLLOWING COMMANDS 50%. WILL NEED SNF FOR REHAB AT D/C PRIOR TO HOME.           IP PT Goals       03/07/17 1008          Bed Mobility PT LTG    Bed Mobility PT LTG, Time to Achieve 2 wks  -CD      Bed Mobility PT LTG, Activity Type all bed mobility  -CD      Bed Mobility PT LTG, [place] Level minimum assist (75% patient effort)  -CD      Transfer Training PT LTG    Transfer Training PT LTG, Time to Achieve 2 wks  -CD      Transfer Training PT LTG, Activity Type all transfers  -CD      Transfer Training PT LTG, [place] Level minimum assist (75% patient effort)  -CD      Transfer Training PT LTG, Assist Device walker, rolling  -CD      Gait Training PT LTG    Gait Training Goal PT LTG, Time to Achieve 2 wks  -CD      Gait Training Goal PT LTG, [place] Level moderate assist (50% patient effort)  -CD      Gait Training Goal PT LTG, Assist Device walker, rolling  -CD      Gait Training Goal PT LTG, Distance to Achieve 150  -CD        User Key  (r) = Recorded By, (t) = Taken By, (c) = Cosigned By    Initials Name Provider Type    MELISSA Aleman, PT Physical  Therapist                Outcome Measures       03/07/17 0822          How much help from another person do you currently need...    Turning from your back to your side while in flat bed without using bedrails? 2  -CD      Moving from lying on back to sitting on the side of a flat bed without bedrails? 2  -CD      Moving to and from a bed to a chair (including a wheelchair)? 2  -CD      Standing up from a chair using your arms (e.g., wheelchair, bedside chair)? 2  -CD      Climbing 3-5 steps with a railing? 1  -CD      To walk in hospital room? 2  -CD      AM-PAC 6 Clicks Score 11  -CD      Functional Assessment    Outcome Measure Options AM-PAC 6 Clicks Basic Mobility (PT)  -CD        User Key  (r) = Recorded By, (t) = Taken By, (c) = Cosigned By    Initials Name Provider Type    MELISSA Aleman PT Physical Therapist           Time Calculation:         PT Charges       03/07/17 1013          Time Calculation    PT Received On 03/07/17  -      PT Goal Re-Cert Due Date 03/17/17  -      Time Calculation- PT    Total Timed Code Minutes- PT 10 minute(s)  -CD        User Key  (r) = Recorded By, (t) = Taken By, (c) = Cosigned By    Initials Name Provider Type    MELISSA Aleman PT Physical Therapist          Therapy Charges for Today     Code Description Service Date Service Provider Modifiers Qty    26023875560 HC PT EVAL MOD COMPLEXITY 4 3/7/2017 Sheri Aleman, PT GP 1    92611416833 HC PT THER PROC EA 15 MIN 3/7/2017 Sheri Aleman, PT GP 1          PT G-Codes  Outcome Measure Options: AM-PAC 6 Clicks Basic Mobility (PT)      Sheri Aleman, PT  3/7/2017

## 2017-03-07 NOTE — PLAN OF CARE
Problem: Skin Integrity Impairment, Risk/Actual (Adult)  Goal: Identify Related Risk Factors and Signs and Symptoms  Outcome: Ongoing (interventions implemented as appropriate)    03/07/17 0354   Skin Integrity Impairment, Risk/Actual   Skin Integrity Impairment, Risk/Actual: Related Risk Factors age extremes;cognitive impairment;immobility;sensory impairment

## 2017-03-07 NOTE — PROGRESS NOTES
[facility] Medicine Services    ASSESSMENT / PLAN          Principal Problem:  Hypertensive emergency  with HTN encephalopathyAltered mental status, unspecified, hypertensive encephalopathy, MRI negative for any acute stroke, status post evaluation by neurologist, eczema on patch is being started, family is requesting a rehabilitation placement, aggressive blood pressure control which is now better at my time of evaluation and probable discharge to rehabilitation over next 1-2 days, continue Plavix high dose statin as does have history of recurrent strokes in the past.    Diarrhea, multiple stools, monitor, negative for c diff   Hypertension, see above  History of Carotid stenosis, repeat carotid duplex ,CT angiogram head reviewed reviewed with microvascular changes and probable old lacunar infarcts  H/o recurrent Cerebrovascular accident (CVA), continue plavix/statin, possible component of vascular dementia       -NIHSS Qshift  -Neuro checks every 4 hours  -IV hydralazine PRN   -Home meds     DVT prophylaxis: SHABBIR/SCDs  Code Status: Full  Length of Stay 0 Days     Expected Discharge disposition in    1-2         Days to rehab   With  Hypertensive emergency/encephalopathy should be inpatient , will change status to inpatinet     SUBJECTIVE--HPI/ Events overnight / CC- Hospital Follow up visit/ ROS-not detailed ,  as performed below    Feel ok, confused, not able to give history, per nurses diarrhea   Gen -no fevers, chills  CV-no chest pain, palpitations  Resp-no cough, dyspnea  GI-no N/V/D, abd pain      OBJECTIVE        Vital Sign Min/Max for last 24 hours  Temp  Min: 97.9 °F (36.6 °C)  Max: 98.7 °F (37.1 °C)   BP  Min: 125/67  Max: 203/66   Pulse  Min: 70  Max: 125   Resp  Min: 16  Max: 18   SpO2  Min: 91 %  Max: 96 %   No Data Recorded      Intake/Output Summary (Last 24 hours) at 03/07/17 1534  Last data filed at 03/07/17 1300   Gross per 24 hour   Intake    240 ml   Output    590 ml    Net   -350 ml         Sitting in recliner   Gen/Constitutional-no acute distress  CV-RRR, S1 S2 normal, 2/6 sys m   Resp-CTAB except basal crackles , no wheezes  Abd-soft, NT, ND, +BS  Ext-no edema  Neuro-A&Ox1, moving all extremities, sensations seem to be intact, limited exam due to inability to follow all directions , no focal deficits  Psych-appropriate mood  Skin, no new rashes over last 24 hours    Medications    Current Facility-Administered Medications:   •  acetaminophen (TYLENOL) tablet 650 mg, 650 mg, Oral, Q4H PRN, Brianda Fountain APRN  •  aspirin tablet 325 mg, 325 mg, Oral, Daily **OR** aspirin suppository 300 mg, 300 mg, Rectal, Daily, Brianda Fountain APRN  •  atenolol (TENORMIN) tablet 50 mg, 50 mg, Oral, Q12H, Brianda Fountain, APRN, 50 mg at 03/07/17 0822  •  atorvastatin (LIPITOR) tablet 80 mg, 80 mg, Oral, Nightly, Brianda Fountain, APRN, 80 mg at 03/06/17 2028  •  bisacodyl (DULCOLAX) suppository 10 mg, 10 mg, Rectal, Daily PRN, Brianda Fountain APRN  •  calcium carbonate (TUMS) chewable tablet 500 mg (200 mg elemental), 2 tablet, Oral, BID PRN, Brianda Fountain APRN  •  clopidogrel (PLAVIX) tablet 75 mg, 75 mg, Oral, Daily, Brianda Fountain, APRN, 75 mg at 03/07/17 0822  •  diltiaZEM CD (CARDIZEM CD) 24 hr capsule 240 mg, 240 mg, Oral, Nightly, Brianda Fountain, APRN, 240 mg at 03/06/17 2029  •  doxazosin (CARDURA) tablet 2 mg, 2 mg, Oral, Nightly, Brianda Fountain, APRN, 2 mg at 03/06/17 2029  •  FLUoxetine (PROzac) capsule 20 mg, 20 mg, Oral, Daily, Brianda Fountain, APRN, 20 mg at 03/07/17 0823  •  haloperidol lactate (HALDOL) injection 2 mg, 2 mg, Intravenous, Q6H PRN, Jaimie Guerra II, DO, 2 mg at 03/06/17 2026  •  hydrALAZINE (APRESOLINE) injection 10 mg, 10 mg, Intravenous, Q6H PRN, GABRIELLE Winter, 10 mg at 03/07/17 1242  •  LORazepam (ATIVAN) injection 0.5 mg, 0.5 mg, Intravenous, Q6H PRN, Jaimie Guerra II, DO, 0.5 mg at 03/07/17 1244  •  magnesium oxide (MAGOX) tablet 400 mg, 400 mg, Oral, BID, Brianda Fountain,  APRN, 400 mg at 03/07/17 0822  •  magnesium sulfate in D5W 1g/100mL (PREMIX) IVPB 1 g, 1 g, Intravenous, PRN **OR** magnesium sulfate 8 g in dextrose (D5W) 5 % 250 mL infusion, 8 g, Intravenous, PRN **OR** magnesium sulfate 6 g in dextrose (D5W) 5 % 250 mL infusion, 6 g, Intravenous, PRN **OR** magnesium sulfate in D5W 1g/100mL (PREMIX) IVPB 1 g, 1 g, Intravenous, PRN, Brianda Serey, APRN  •  methylphenidate (RITALIN) tablet 5 mg, 5 mg, Oral, Daily, Brianda Serey, APRN, 5 mg at 03/07/17 0823  •  ondansetron (ZOFRAN) tablet 4 mg, 4 mg, Oral, Q6H PRN **OR** ondansetron (ZOFRAN) injection 4 mg, 4 mg, Intravenous, Q6H PRN, Brianda Sermarco, APRN  •  pantoprazole (PROTONIX) EC tablet 40 mg, 40 mg, Oral, Daily, Brianda Serey, APRN, 40 mg at 03/07/17 0823  •  potassium chloride (MICRO-K) CR capsule 40 mEq, 40 mEq, Oral, PRN, 40 mEq at 03/07/17 0822 **OR** potassium chloride (KLOR-CON) packet 40 mEq, 40 mEq, Oral, PRN **OR** potassium chloride 10 mEq in 100 mL IVPB, 10 mEq, Intravenous, Q1H PRN, Brianda Sermarco, APRN  •  rivastigmine (EXELON) 4.6 MG/24HR patch 1 patch, 1 patch, Transdermal, Daily, Jaimie Holguin MD  •  sennosides-docusate sodium (SENOKOT-S) 8.6-50 MG tablet 2 tablet, 2 tablet, Oral, BID PRN, Brianda Sermarco, APRN  •  sodium chloride 0.9 % flush 1-10 mL, 1-10 mL, Intravenous, PRN, Brianda Serey, APRN, 10 mL at 03/07/17 0822  I have reviewed the labs, culture data, radiology results, and diagnostic studies.    Results from last 7 days  Lab Units 03/07/17  0600 03/06/17  1405 03/05/17  2234 03/05/17  2223   SODIUM mmol/L 139 139  --   --    POTASSIUM mmol/L 3.6 3.8  --   --    CHLORIDE mmol/L 104 104  --   --    TOTAL CO2 mmol/L 25.0 28.0  --   --    BUN mg/dL 14 16  --   --    CREATININE mg/dL 0.60 0.70  --  0.90   CALCIUM mg/dL 10.7* 10.9*  --   --    BILIRUBIN mg/dL  --  0.2*  --   --    ALK PHOS U/L  --  76  --   --    ALT (SGPT) U/L  --  39 41*  --    AST (SGOT) U/L  --  29 39*  --    GLUCOSE mg/dL 122* 100  --   --         Results from last 7 days  Lab Units 03/07/17  0600 03/06/17  1405 03/05/17  2234   WBC 10*3/mm3 10.23 5.86 6.49   HEMOGLOBIN g/dL 12.5 11.8 12.6   HEMATOCRIT % 38.7 36.6 38.4   PLATELETS 10*3/mm3 254 205 56*           Culture Data:    Radiology Results:  Imaging Results (last 24 hours)     ** No results found for the last 24 hours. **        *. Please note that portions of this note were completed with a voice recognition program. Efforts were made to edit the dictations, but occasionally words are mistranscribed.  Maikol Arteaga MD03/07/173:34 PM

## 2017-03-07 NOTE — PROGRESS NOTES
Discharge Planning Assessment  [facility]     Patient Name: Loni Walker  MRN: [id number]  Today's Date: 3/7/2017    Admit Date: 3/6/2017          Discharge Needs Assessment       03/07/17 9069    Living Environment    Lives With child(rolando), adult   Lives with adult daughterMaribel (POA)    Living Arrangements house   One step to enter the home    Provides Primary Care For no one, unable/limited ability to care for self    Primary Care Provided By child(rolando) (specify)    Caregiving Concerns Stefania López stated she can no longer care for her mother at home, wants placement    Quality Of Family Relationships helpful;involved    Able to Return to Prior Living Arrangements no    Discharge Needs Assessment    Concerns To Be Addressed discharge planning concerns    Readmission Within The Last 30 Days no previous admission in last 30 days    Outpatient/Agency/Support Group Needs homecare agency (specify level of care)    Community Agency Name(S) [facility]    Anticipated Changes Related to Illness inability to care for self    Equipment Currently Used at Home walker, rolling;wheelchair;shower chair;grab bar    Equipment Needed After Discharge none    Discharge Facility/Level Of Care Needs nursing facility, intermediate    Transportation Available car;family or friend will provide    Current Discharge Risk dependent with mobility/activities of daily living;physical impairment    Discharge Disposition long-term care    Discharge Contact Information if Applicable Renetta López- CORINE- [phone]            Discharge Plan       03/07/17 1402    Case Management/Social Work Plan    Plan LTC    Patient/Family In Agreement With Plan yes    Additional Comments Spoke at length with Maribel jaffe (CORINE) regarding discharge plan.  Pt lives with daughter and was recently discharged from [facility] approximately 16 days ago.  Pt has declined since discharge from [facility], pt was at the skilled facility for 100 days.  Pt is  mostly dependent with all ADL's and was currently HH services with [facility].  CM informed daughter that patient is currently here under observation status and due to not having a 3 midnight inpatient qualifying stay, patient could not transfer to a skilled facility, unless private pay.  Daughter wants to look for long term placement and apply patient for Medicaid.  CM explained Medicaid application process to daughter and she verbalized understanding.  CM will begin seeking long term placement in [place] and [facility], Medicaid pending.  SW consulted to discuss Medicaid options with daughter.  CM will continue to follow for discharge placement needs.        03/07/17 1401    Case Management/Social Work Plan    Plan Social work spoke with Mitali López, [phone]     Patient/Family In Agreement With Plan yes        Discharge Placement     No information found        Expected Discharge Date and Time     Expected Discharge Date Expected Discharge Time    Mar 9, 2017               Demographic Summary       03/07/17 1349    Referral Information    Admission Type observation    Arrived From home or self-care    Referral Source admission list    Reason For Consult discharge planning;facility placement    Record Reviewed history and physical;medical record;patient profile    Contact Information    Permission Granted to Share Information With family/designee    Primary Care Physician Information    Name Parris Love            Functional Status       03/07/17 1350    Functional Status Current    Current Functional Level Comment See Nursing Assessment    Change in Functional Status Since Onset of Current Illness/Injury yes    Functional Status Prior    Ambulation 3-->assistive equipment and person    Transferring 3-->assistive equipment and person    Toileting 4-->completely dependent    Bathing 2-->assistive person    Dressing 2-->assistive person    Eating 2-->assistive person    Communication  0-->understands/communicates without difficulty    Swallowing 0-->swallows foods/liquids without difficulty    IADL    Medications completely dependent    Meal Preparation completely dependent    Housekeeping completely dependent    Laundry completely dependent    Shopping completely dependent    Oral Care assistive person    Activity Tolerance    Usual Activity Tolerance moderate    Current Activity Tolerance moderate    Employment/Financial    Employment/Finance Comments Daughter confirms pt has Medicare A/B and [employer] secondary with prescription coverage            Psychosocial     None            Abuse/Neglect     None            Legal     None            Substance Abuse     None            Patient Forms     None          Ivana Mcdonald RN

## 2017-03-08 LAB
ANION GAP SERPL CALCULATED.3IONS-SCNC: 7 MMOL/L (ref 3–11)
BACTERIA UR QL AUTO: ABNORMAL /HPF
BASOPHILS # BLD AUTO: 0.03 10*3/MM3 (ref 0–0.2)
BASOPHILS NFR BLD AUTO: 0.3 % (ref 0–1)
BILIRUB UR QL STRIP: NEGATIVE
BUN BLD-MCNC: 13 MG/DL (ref 9–23)
BUN/CREAT SERPL: 18.6 (ref 7–25)
CALCIUM SPEC-SCNC: 10.4 MG/DL (ref 8.7–10.4)
CHLORIDE SERPL-SCNC: 104 MMOL/L (ref 99–109)
CLARITY UR: ABNORMAL
CO2 SERPL-SCNC: 25 MMOL/L (ref 20–31)
COLOR UR: YELLOW
CREAT BLD-MCNC: 0.7 MG/DL (ref 0.6–1.3)
DEPRECATED RDW RBC AUTO: 49.8 FL (ref 37–54)
EOSINOPHIL # BLD AUTO: 0.07 10*3/MM3 (ref 0.1–0.3)
EOSINOPHIL NFR BLD AUTO: 0.6 % (ref 0–3)
ERYTHROCYTE [DISTWIDTH] IN BLOOD BY AUTOMATED COUNT: 14.5 % (ref 11.3–14.5)
GFR SERPL CREATININE-BSD FRML MDRD: 81 ML/MIN/1.73
GLUCOSE BLD-MCNC: 148 MG/DL (ref 70–100)
GLUCOSE UR STRIP-MCNC: NEGATIVE MG/DL
HCT VFR BLD AUTO: 36.4 % (ref 34.5–44)
HGB BLD-MCNC: 11.7 G/DL (ref 11.5–15.5)
HGB UR QL STRIP.AUTO: NEGATIVE
HYALINE CASTS UR QL AUTO: ABNORMAL /LPF
IMM GRANULOCYTES # BLD: 0.03 10*3/MM3 (ref 0–0.03)
IMM GRANULOCYTES NFR BLD: 0.3 % (ref 0–0.6)
KETONES UR QL STRIP: NEGATIVE
LEUKOCYTE ESTERASE UR QL STRIP.AUTO: ABNORMAL
LYMPHOCYTES # BLD AUTO: 1.65 10*3/MM3 (ref 0.6–4.8)
LYMPHOCYTES NFR BLD AUTO: 14.7 % (ref 24–44)
MAGNESIUM SERPL-MCNC: 2 MG/DL (ref 1.3–2.7)
MCH RBC QN AUTO: 29.8 PG (ref 27–31)
MCHC RBC AUTO-ENTMCNC: 32.1 G/DL (ref 32–36)
MCV RBC AUTO: 92.6 FL (ref 80–99)
MONOCYTES # BLD AUTO: 0.76 10*3/MM3 (ref 0–1)
MONOCYTES NFR BLD AUTO: 6.8 % (ref 0–12)
NEUTROPHILS # BLD AUTO: 8.65 10*3/MM3 (ref 1.5–8.3)
NEUTROPHILS NFR BLD AUTO: 77.3 % (ref 41–71)
NITRITE UR QL STRIP: POSITIVE
PH UR STRIP.AUTO: 7 [PH] (ref 5–8)
PLATELET # BLD AUTO: 239 10*3/MM3 (ref 150–450)
PMV BLD AUTO: 11.4 FL (ref 6–12)
POTASSIUM BLD-SCNC: 3.8 MMOL/L (ref 3.5–5.5)
PROT UR QL STRIP: NEGATIVE
RBC # BLD AUTO: 3.93 10*6/MM3 (ref 3.89–5.14)
RBC # UR: ABNORMAL /HPF
REF LAB TEST METHOD: ABNORMAL
RPR SER QL: NORMAL
SODIUM BLD-SCNC: 136 MMOL/L (ref 132–146)
SP GR UR STRIP: 1.01 (ref 1–1.03)
SQUAMOUS #/AREA URNS HPF: ABNORMAL /HPF
UROBILINOGEN UR QL STRIP: ABNORMAL
WBC NRBC COR # BLD: 11.19 10*3/MM3 (ref 3.5–10.8)
WBC UR QL AUTO: ABNORMAL /HPF

## 2017-03-08 PROCEDURE — G0378 HOSPITAL OBSERVATION PER HR: HCPCS

## 2017-03-08 PROCEDURE — 92507 TX SP LANG VOICE COMM INDIV: CPT

## 2017-03-08 PROCEDURE — 99213 OFFICE O/P EST LOW 20 MIN: CPT | Performed by: PSYCHIATRY & NEUROLOGY

## 2017-03-08 PROCEDURE — 83735 ASSAY OF MAGNESIUM: CPT | Performed by: FAMILY MEDICINE

## 2017-03-08 PROCEDURE — 85025 COMPLETE CBC W/AUTO DIFF WBC: CPT | Performed by: FAMILY MEDICINE

## 2017-03-08 PROCEDURE — 97530 THERAPEUTIC ACTIVITIES: CPT

## 2017-03-08 PROCEDURE — 81001 URINALYSIS AUTO W/SCOPE: CPT | Performed by: UROLOGY

## 2017-03-08 PROCEDURE — 80048 BASIC METABOLIC PNL TOTAL CA: CPT | Performed by: FAMILY MEDICINE

## 2017-03-08 PROCEDURE — 99225 PR SBSQ OBSERVATION CARE/DAY 25 MINUTES: CPT | Performed by: NURSE PRACTITIONER

## 2017-03-08 RX ADMIN — DILTIAZEM HYDROCHLORIDE 240 MG: 240 CAPSULE, COATED, EXTENDED RELEASE ORAL at 22:06

## 2017-03-08 RX ADMIN — METHYLPHENIDATE HYDROCHLORIDE 5 MG: 5 TABLET ORAL at 08:09

## 2017-03-08 RX ADMIN — DOXAZOSIN MESYLATE 2 MG: 1 TABLET ORAL at 22:07

## 2017-03-08 RX ADMIN — ASPIRIN 325 MG ORAL TABLET 325 MG: 325 PILL ORAL at 08:10

## 2017-03-08 RX ADMIN — PANTOPRAZOLE SODIUM 40 MG: 40 TABLET, DELAYED RELEASE ORAL at 08:10

## 2017-03-08 RX ADMIN — RIVASTIGMINE TRANSDERMAL SYSTEM 1 PATCH: 4.6 PATCH, EXTENDED RELEASE TRANSDERMAL at 08:10

## 2017-03-08 RX ADMIN — ATENOLOL 50 MG: 50 TABLET ORAL at 08:10

## 2017-03-08 RX ADMIN — ATORVASTATIN CALCIUM 80 MG: 40 TABLET, FILM COATED ORAL at 22:06

## 2017-03-08 RX ADMIN — CLOPIDOGREL BISULFATE 75 MG: 75 TABLET, FILM COATED ORAL at 08:10

## 2017-03-08 RX ADMIN — FLUOXETINE HYDROCHLORIDE 20 MG: 20 CAPSULE ORAL at 08:10

## 2017-03-08 RX ADMIN — Medication 400 MG: at 08:10

## 2017-03-08 RX ADMIN — Medication 400 MG: at 17:08

## 2017-03-08 RX ADMIN — ATENOLOL 50 MG: 50 TABLET ORAL at 22:07

## 2017-03-08 NOTE — PROGRESS NOTES
Acute Care - Speech Language Pathology Progress Note  [facility]     Patient Name: Loni Walker  : 1940  MRN: [id number]  Today's Date: 3/8/2017         Admit Date: 3/6/2017    Visit Dx:      ICD-10-CM ICD-9-CM   1. Altered mental status, unspecified R41.82 780.97   2. Weakness R53.1 780.79   3. FTT (failure to thrive) in adult R62.7 783.7   4. Impaired mobility and ADLs Z74.09 799.89   5. Impaired functional mobility, balance, gait, and endurance Z74.09 V49.89     Patient Active Problem List   Diagnosis   • Hypertension   • CVA (cerebral vascular accident)   • Carotid stenosis   • Constipation   • Cerebrovascular accident (CVA)   • Altered mental status, unspecified   • Hypertensive emergency              Adult Rehabilitation Note       17 0900          Rehab Assessment/Intervention    Discipline speech language pathologist  -AV      Document Type therapy note (daily note)  -AV      Subjective Information no complaints  -AV      Recorded by [AV] Emilia Coleman MS CCC-SLP      Pain Assessment    Pain Assessment No/denies pain  -AV      Recorded by [AV] Emilia Coleman MS CCC-SLP      Improve ability to comprehend words/phrases/sentences    Improve ability to comprehend words/phrases/sentences through: identify objects, field of;80%  -AV      Ability to Comprehend Words/Phrases/Sentences Progress 50%;with consistent cues  -AV      Recorded by [AV] Emilia Coleman MS CCC-SLP      Improve ability to follow directions    Improve ability to follow directions: one-step directions with objects;one-step directions without objects;demonstrate function of object;80%  -AV      Ability to Follow Directions Progress 60%;with inconsistent cues  -AV      Recorded by [AV] Emilia Coleman MS CCC-SLP      Improve ability to comprehend questions    Improve ability to comprehend questions: complex yes/no questions;80%  -AV      Ability to Comprehend Questions Progress 40%;with inconsistent cues  -AV       Recorded by [AV] Emilia Coleman MS CCC-SLP      Improve word retrieval skills    Improve word retrieval skills by: naming an object/pic;80%  -AV      Word Retrieval Skills Progress 70%;with inconsistent cues  -AV      Recorded by [AV] Emilia Coleman MS CCC-SLP      Improve ability to construct phrase and sentence level responses    Improve ability to construct phrase and sentence level responses by: answering question with phrase;80%  -AV      Constructing Phrase and Sentence Level Responses Progress 50%;with consistent cues  -AV      Recorded by [AV] Emilia Coleman MS CCC-SLP        User Key  (r) = Recorded By, (t) = Taken By, (c) = Cosigned By    Initials Name Effective Dates    AV Emilia Coleman MS CCC-SLP 03/14/16 -               IP SLP Goals       03/08/17 0939 03/07/17 1344       Receptive Language- Optimal Participation in Care    Receptive Language Optimal Participation in Care- SLP, Date Established  03/07/17  -AV     Receptive Language Optimal Participation in Care- SLP, Time to Achieve  by discharge  -AV     Receptive Language Optimal Participation in Care- SLP, Activity Level  Patient will improve ability to comprehend words/phrases/sentences;Patient will improve ability to follow directions;Patient will improve ability to comprehend questions  -AV     Receptive Language Optimal Participation in Care- SLP, Outcome goal ongoing  -AV      Expressive- Optimal Participation in Care    Expressive Optimal Participation in Care- SLP, Date Established  03/07/17  -AV     Expressive Optimal Participation in Care- SLP, Time to Achieve  by discharge  -AV     Expressive Optimal Participation in Care- SLP, Activity Level  Patient will improve word retrieval skills;Patient will improve ability to construct phrase and sentence level responses;Patient will improve connected speech to express thoughts  -AV     Expressive Optimal Participation in Care- SLP, Outcome goal ongoing  -AV        User Key  (r) =  Recorded By, (t) = Taken By, (c) = Cosigned By    Initials Name Provider Type    EVA Coleman MS CCC-SLP Speech and Language Pathologist          EDUCATION  The patient has been educated in the following areas:   Cognitive Impairment Communication Impairment.    SLP Recommendation and Plan  SLP Diagnosis: expressive and receptive difficulties   Prognosis: good   Rehab Potential: good, to achieve stated therapy goals  Criteria for Skilled Therapeutic Interventions Met: skilled criteria for speech language intervention met  Anticipated Discharge Disposition: home with assist     Therapy Frequency: 5 times/wk          Plan of Care Review  Plan Of Care Reviewed With: patient, family  Outcome Summary/Follow up Plan: Cog/comm tx this am:  patient with improvement in word finding since evaluation however still having difficulty.  Family reports this is new.  Patient had some mild residual dysarthria from stroke in Sept. however was communicating withour difficulty.,  Patient able to follow simple directions, complete simple object fubnction and name objects.  SLP to cont to follow           Time Calculation:         Time Calculation- SLP       03/08/17 0941          Time Calculation- SLP    SLP Start Time 0945  -AV      SLP Received On 03/08/17  -        User Key  (r) = Recorded By, (t) = Taken By, (c) = Cosigned By    Initials Name Provider Type    AV Emilia Coleman MS CCC-SLP Speech and Language Pathologist          Therapy Charges for Today     Code Description Service Date Service Provider Modifiers Qty    57224549221 HC ST SPOKEN LANG EXPRESS CURRENT 3/7/2017 Emilia Coleman MS CCC-SLP GN, CK 1    06520875580 HC ST SPOKEN LANG EXPRESS PROJECTED 3/7/2017 Emilia Coleman MS CCC-DEREJE PITTS,  1    87800824205 HC ST EVAL SPEECH AND PROD W LANG  3 3/7/2017 Emilia Coleman MS CCC-SLP GN 1    61262464387 HC ST TREATMENT SPEECH 3 3/8/2017 Emilia Coleman MS CCC-SLP GN 1          SLP G-Codes  Functional  Limitations: Spoken language expressive  Spoken Language Expression Current Status (): At least 40 percent but less than 60 percent impaired, limited or restricted  Spoken Language Expression Goal Status (): 0 percent impaired, limited or restricted    Emilia Coleman MS CCC-SLP  3/8/2017

## 2017-03-08 NOTE — PLAN OF CARE
Problem: Pressure Ulcer Risk (Mak Scale) (Adult,Obstetrics,Pediatric)  Goal: Identify Related Risk Factors and Signs and Symptoms  Outcome: Outcome(s) achieved Date Met:  03/08/17

## 2017-03-08 NOTE — PLAN OF CARE
Problem: Patient Care Overview (Adult)  Goal: Plan of Care Review  Outcome: Ongoing (interventions implemented as appropriate)    03/08/17 0939   Coping/Psychosocial Response Interventions   Plan Of Care Reviewed With patient;family   Outcome Evaluation   Outcome Summary/Follow up Plan Cog/comm tx this am: patient with improvement in word finding since evaluation however still having difficulty. Family reports this is new. Patient had some mild residual dysarthria from stroke in Sept. however was communicating withour difficulty., Patient able to follow simple directions, complete simple object fubnction and name objects. SLP to cont to follow          Problem: Inpatient SLP  Goal: Expressive-Patient will improve expressive language skills to allow optimal participation in care  Outcome: Ongoing (interventions implemented as appropriate)    03/07/17 1344 03/08/17 0939   Expressive- Optimal Participation in Care   Expressive Optimal Participation in Care- SLP, Time to Achieve by discharge --    Expressive Optimal Participation in Care- SLP, Activity Level Patient will improve word retrieval skills;Patient will improve ability to construct phrase and sentence level responses;Patient will improve connected speech to express thoughts --    Expressive Optimal Participation in Care- SLP, Outcome --  goal ongoing       Goal: Receptive Language-Patient will improve receptive language skills to allow optimal participation in care  Outcome: Ongoing (interventions implemented as appropriate)    03/07/17 1344 03/08/17 0939   Receptive Language- Optimal Participation in Care   Receptive Language Optimal Participation in Care- SLP, Time to Achieve by discharge --    Receptive Language Optimal Participation in Care- SLP, Activity Level Patient will improve ability to comprehend words/phrases/sentences;Patient will improve ability to follow directions;Patient will improve ability to comprehend questions --    Receptive Language  Optimal Participation in Care- SLP, Outcome --  goal ongoing

## 2017-03-08 NOTE — PROGRESS NOTES
Daily Progress Note  Neurology     LOS: 0 days     Subjective     Interval History: BP better controlled today.  No acute events overnight     ROS: Negative for fever    Objective     Vital signs in last 24 hours:  Temp:  [98 °F (36.7 °C)-98.5 °F (36.9 °C)] 98.5 °F (36.9 °C)  Heart Rate:  [] 79  Resp:  [16-18] 16  BP: (130-158)/(63-73) 130/72      Physical Exam:   General: Lying in bed with eyes open. In NAD.     Respiratory: Respirations unlabored   CV: RRR       Neurologic Exam:   Mental status: Awake, alert, Follows commands. Speech fluent   CN: II-XII intact to detailed exam                  Results from last 7 days  Lab Units 03/08/17  0646   WBC 10*3/mm3 11.19*   HEMOGLOBIN g/dL 11.7   HEMATOCRIT % 36.4   PLATELETS 10*3/mm3 239           Results Review:  Labs reviewed      Assessment/Plan     Principal Problem:    Altered mental status, unspecified  Active Problems:    Hypertension    Carotid stenosis    Cerebrovascular accident (CVA)    Hypertensive emergency        1.  Encephalopathy = likely due to hypertensive encephalopathy which appears to be improving with BP correction.  I do also question if she has underlying mild dementia as well.  Currently on Exelon patch.  Metabolic labs obtained appear to be unremarkable, including TSH, vitamin B12, and nonreactive RPR. Recommend continuing supportive care and medical workup.    No further recommendations at this time.  Neurology will follow as needed.  Please call with questions.  On discharge, recommend follow up in neurology clinic with Dr. Raymundo Topete, first available appointment.        Jaimie Holguin MD  03/08/17  3:07 PM

## 2017-03-08 NOTE — PROGRESS NOTES
Continued Stay Note   Con     Patient Name: Loni Walker  MRN: [id number]  Today's Date: 3/8/2017    Admit Date: 3/6/2017          Discharge Plan       03/08/17 1419    Case Management/Social Work Plan    Plan LTC placement    Patient/Family In Agreement With Plan yes    Additional Comments Spoke with Kat at [facility] in [facility].  They are going to come screen patient tomorrow, 3/9 for Medicaid.  Long term placement ongoing.  CM will continue to follow.               Discharge Codes     None        Expected Discharge Date and Time     Expected Discharge Date Expected Discharge Time    Mar 11, 2017             Ivana Mcdonald RN

## 2017-03-08 NOTE — PLAN OF CARE
Problem: Pressure Ulcer Risk (Mak Scale) (Adult,Obstetrics,Pediatric)  Goal: Skin Integrity  Outcome: Outcome(s) achieved Date Met:  03/08/17

## 2017-03-08 NOTE — PROGRESS NOTES
Acute Care - Occupational Therapy Treatment Note  [facility]     Patient Name: Loni Walker  : 1940  MRN: [id number]  Today's Date: 3/8/2017  Onset of Illness/Injury or Date of Surgery Date: 17  Date of Referral to OT: 17  Referring Physician: GABRIELLE GASPAR      Admit Date: 3/6/2017    Visit Dx:     ICD-10-CM ICD-9-CM   1. Altered mental status, unspecified R41.82 780.97   2. Weakness R53.1 780.79   3. FTT (failure to thrive) in adult R62.7 783.7   4. Impaired mobility and ADLs Z74.09 799.89   5. Impaired functional mobility, balance, gait, and endurance Z74.09 V49.89     Patient Active Problem List   Diagnosis   • Hypertension   • CVA (cerebral vascular accident)   • Carotid stenosis   • Constipation   • Cerebrovascular accident (CVA)   • Altered mental status, unspecified   • Hypertensive emergency             Adult Rehabilitation Note       17 1400 17 0900       Rehab Assessment/Intervention    Discipline occupational therapist  -MC speech language pathologist  -AV     Document Type therapy note (daily note)  - therapy note (daily note)  -AV     Subjective Information agree to therapy   Pt initially agreed to tx, then during tx refused  - no complaints  -AV     Patient Effort, Rehab Treatment poor  -      Precautions/Limitations fall precautions   Exit alarm  -MC      Recorded by [] Devika Tejeda OT [AV] Emilia Coleman MS CCC-SLP     Pain Assessment    Pain Assessment No/denies pain  - No/denies pain  -AV     Recorded by [] Devika Tejeda OT [AV] Emilia Coleman MS CCC-SLP     Cognitive Assessment/Intervention    Current Cognitive/Communication Assessment impaired  -      Orientation Status oriented to;person;place;time;other (see comments)   With choices  -      Follows Commands/Answers Questions 25% of the time  -MC      Recorded by [] Devika Tejeda OT      Improve ability to comprehend words/phrases/sentences    Improve ability to comprehend  words/phrases/sentences through:  identify objects, field of;80%  -AV     Ability to Comprehend Words/Phrases/Sentences Progress  50%;with consistent cues  -AV     Recorded by  [AV] Emilia Coleman MS CCC-SLP     Improve ability to follow directions    Improve ability to follow directions:  one-step directions with objects;one-step directions without objects;demonstrate function of object;80%  -AV     Ability to Follow Directions Progress  60%;with inconsistent cues  -AV     Recorded by  [AV] Emilia Coleman MS CCC-SLP     Improve ability to comprehend questions    Improve ability to comprehend questions:  complex yes/no questions;80%  -AV     Ability to Comprehend Questions Progress  40%;with inconsistent cues  -AV     Recorded by  [AV] Emilia Coleman MS CCC-SLP     Improve word retrieval skills    Improve word retrieval skills by:  naming an object/pic;80%  -AV     Word Retrieval Skills Progress  70%;with inconsistent cues  -AV     Recorded by  [AV] mEilia Coleman MS CCC-SLP     Improve ability to construct phrase and sentence level responses    Improve ability to construct phrase and sentence level responses by:  answering question with phrase;80%  -AV     Constructing Phrase and Sentence Level Responses Progress  50%;with consistent cues  -AV     Recorded by  [AV] Emilia Coleman MS CCC-SLP     Bed Mobility, Assessment/Treatment    Bed Mobility, Comment Refused  -MC      Recorded by [] Devika Tejeda OT      Therapy Exercises    Bilateral Lower Extremities AAROM:;10 reps  -      Bilateral Upper Extremity --   Refused  -MC      Recorded by [] Devika Tejeda OT      Positioning and Restraints    Pre-Treatment Position in bed  -      Post Treatment Position bed  -      In Bed notified nsg;supine;call light within reach;encouraged to call for assist;exit alarm on;side rails up x3  -MC      Recorded by [] Devika Tejeda OT        User Key  (r) = Recorded By, (t) = Taken By, (c) = Cosigned By     Initials Name Effective Dates     Devika Tejeda, OT 03/14/16 -     AV Emilia Coleman, MS CCC-SLP 03/14/16 -                 OT Goals       03/07/17 1019          Bed Mobility OT LTG    Bed Mobility OT LTG, Date Established 03/07/17  -AN      Bed Mobility OT LTG, Time to Achieve 1 wk  -AN      Bed Mobility OT LTG, Activity Type supine to sit/sit to supine  -AN      Bed Mobility OT LTG, [place] Level minimum assist (75% patient effort)  -AN      Transfer Training 2 OT STG    Transfer Training 2 OT STG, Date Established 03/07/17  -AN      Transfer Training 2 OT STG, Time to Achieve 1 wk  -AN      Transfer Training 2 OT STG, Activity Type toilet  -AN      Transfer Training 2 OT STG, [place] Level minimum assist (75% patient effort)  -AN      Toileting OT LTG    Toileting Goal OT LTG, Date Established 03/07/17  -AN      Toileting Goal OT LTG, Time to Achieve 1 wk  -AN      Toileting Goal OT LTG, [place] Level minimum assist (75% patient effort)  -AN      Coordination OT LTG    Coordination OT LTG, Date Established 03/07/17  -AN      Coordination OT LTG, Time to Achieve 1 wk  -AN      Coordination OT LTG, Activity Type FM task;GM task  -AN      Coordination OT LTG, [place] Level standby assist  -AN        User Key  (r) = Recorded By, (t) = Taken By, (c) = Cosigned By    Initials Name Provider Type    AN Shauna Mullen OT Occupational Therapist          Occupational Therapy Education     Title: PT OT SLP Therapies (Active)     Topic: Occupational Therapy (Active)     Point: ADL training (Done)    Description: Instruct learner(s) on proper safety adaptation and remediation techniques during self care or transfers.   Instruct in proper use of assistive devices.    Learning Progress Summary    Learner Readiness Method Response Comment Documented by Status   Patient Acceptance E LETY SINGH Educated pt and fm on POC, need for assist with ADl's at this time. AN 03/07/17 1018 Done   Family Acceptance LETY PERES  Educated pt and fm on POC, need for assist with ADl's at this time. AN 03/07/17 1018 Done               Point: Home exercise program (Active)    Description: Instruct learner(s) on appropriate technique for monitoring, assisting and/or progressing therapeutic exercises/activities.    Learning Progress Summary    Learner Readiness Method Response Comment Documented by Status   Patient Refuses E NR   03/08/17 1432 Active    Acceptance E VU,NR Educated pt and fm on POC, need for assist with ADl's at this time. AN 03/07/17 1018 Done   Family Acceptance E VU,NR Educated pt and fm on POC, need for assist with ADl's at this time. AN 03/07/17 1018 Done                      User Key     Initials Effective Dates Name Provider Type Discipline     06/22/15 -  Shauna Mullen, OT Occupational Therapist OT     03/14/16 -  Devika Tejeda OT Occupational Therapist OT                  OT Recommendation and Plan  Anticipated Equipment Needs At Discharge:  (TBD)  Anticipated Discharge Disposition: skilled nursing facility  Therapy Frequency: daily  Plan of Care Review  Plan Of Care Reviewed With: patient  Progress: progress toward functional goals is gradual  Outcome Summary/Follow up Plan: Pt initially agreeable to OT tx, then during exercises she got frustrated and reported she needed to be left alone.  OT obliged.  Cont OT POC         Outcome Measures       03/08/17 1400 03/07/17 0822 03/07/17 0821    How much help from another person do you currently need...    Turning from your back to your side while in flat bed without using bedrails?  2  -CD     Moving from lying on back to sitting on the side of a flat bed without bedrails?  2  -CD     Moving to and from a bed to a chair (including a wheelchair)?  2  -CD     Standing up from a chair using your arms (e.g., wheelchair, bedside chair)?  2  -CD     Climbing 3-5 steps with a railing?  1  -CD     To walk in hospital room?  2  -CD     AM-PAC 6 Clicks Score  11  -CD     How much  help from another is currently needed...    Putting on and taking off regular lower body clothing? 2  -  2  -AN    Bathing (including washing, rinsing, and drying) 2  -  2  -AN    Toileting (which includes using toilet bed pan or urinal) 2  -  2  -AN    Putting on and taking off regular upper body clothing 2  -  2  -AN    Taking care of personal grooming (such as brushing teeth) 2  -  2  -AN    Eating meals 2  -  2  -AN    Score 12  -  12  -AN    Modified [place] Scale    Modified [place] Scale   4 - Moderately severe disability.  Unable to walk without assistance, and unable to attend to own bodily needs without assistance.  -AN    Functional Assessment    Outcome Measure Options AM-PAC 6 Clicks Daily Activity (OT)  - AM-PAC 6 Clicks Basic Mobility (PT)  - AM-PAC 6 Clicks Daily Activity (OT);Modified [place]  -AN      User Key  (r) = Recorded By, (t) = Taken By, (c) = Cosigned By    Initials Name Provider Type     Sheri Aleman, PT Physical Therapist    KAR Mullen, OT Occupational Therapist     Devika Tejeda OT Occupational Therapist           Time Calculation:         Time Calculation- OT       03/08/17 1438          Time Calculation-     OT Start Time 1400  -      Total Timed Code Minutes- OT 10 minute(s)  -      OT Received On 03/08/17  -      OT Goal Re-Cert Due Date 03/17/17  -        User Key  (r) = Recorded By, (t) = Taken By, (c) = Cosigned By    Initials Name Provider Type     Devika Tejeda, OT Occupational Therapist           Therapy Charges for Today     Code Description Service Date Service Provider Modifiers Qty    [id number]  OT THERAPEUTIC ACT EA 15 MIN 3/8/2017 Devika Tejeda OT GO 1          OT G-codes  OT Functional Scales Options: AM-PAC 6 Clicks Daily Activity (OT)  Score: 12  Functional Limitation: Self care  Self Care Current Status (): At least 60 percent but less than 80 percent impaired, limited or restricted  Self Care Goal Status (): At least  40 percent but less than 60 percent impaired, limited or restricted    Devika Tejeda, OT  3/8/2017

## 2017-03-08 NOTE — PLAN OF CARE
Problem: Urine Elimination, Impaired (Adult)  Goal: Identify Related Risk Factors and Signs and Symptoms  Outcome: Ongoing (interventions implemented as appropriate)  Goal: Effective Urinary Elimination  Outcome: Ongoing (interventions implemented as appropriate)  Goal: Effective Containment of Urine  Outcome: Ongoing (interventions implemented as appropriate)  Goal: Reduced Incontinence Episodes  Outcome: Ongoing (interventions implemented as appropriate)

## 2017-03-08 NOTE — CONSULTS
Urology Consult    Date of Admission: 3/6/2017  Date of Consult: 17    Primary Care Physician: No Known Provider  Referring Physician: Hospitalist  service    Chief complaint/Reason for consultation; urinary retention    Subjective     History of Present Illness this is a 77-year-old white female  admitted 2 days ago with altered mental status.  She has had a stroke some time in the last year has had deterioration in her performance status ever since.  She now lives with her daughter but is mostly bed and/or wheelchair bound.  Since being here in the hospital she has required in and out catheterization 3-4 times in the last 48 hours due to retention of urine.  She does have some incontinence in the bed and in diapers and is incontinent of stool.  She has been a patient of Dr. Yifan Steiner in the past for her urinary issues but hasn't been in to see him in the last 2+ years (according to family).    Review of Systems   Otherwise complete ROS is negative except as mentioned above.    Past Medical History:  has a past medical history of Arthritis; Carotid stenosis; Cataracts, bilateral; Coronary artery disease; CVA (cerebral vascular accident); migraines; and Hypertension.    Past Surgical History:  has a past surgical history that includes Cholecystectomy; Hysterectomy; and Abdominal surgery.    Family History: family history includes Arthritis in her mother; Diabetes in her father; Early death in her mother; Heart disease in her father; Hyperlipidemia in her father; Hypertension in her daughter and father.    Social History:  reports that she quit smoking about 5 months ago. She has a 15.00 pack-year smoking history. She has never used smokeless tobacco. She reports that she does not drink alcohol or use illicit drugs.    Medications: Scheduled Meds:  aspirin 325 mg Oral Daily   Or      aspirin 300 mg Rectal Daily   atenolol 50 mg Oral Q12H   atorvastatin 80 mg Oral Nightly   clopidogrel 75 mg Oral  Daily   diltiaZEM  mg Oral Nightly   doxazosin 2 mg Oral Nightly   FLUoxetine 20 mg Oral Daily   magnesium oxide 400 mg Oral BID   methylphenidate 5 mg Oral Daily   pantoprazole 40 mg Oral Daily   rivastigmine 1 patch Transdermal Daily     Continuous Infusions:   PRN Meds:.•  acetaminophen  •  bisacodyl  •  calcium carbonate  •  haloperidol lactate  •  hydrALAZINE  •  LORazepam  •  magnesium sulfate in D5W 1g/100mL (PREMIX) **OR** magnesium sulfate bolus in 250 mL **OR** magnesium sulfate bolus in 250 mL **OR** magnesium sulfate in D5W 1g/100mL (PREMIX)  •  ondansetron **OR** ondansetron  •  potassium chloride **OR** potassium chloride **OR** potassium chloride  •  sennosides-docusate sodium  •  sodium chloride  Allergies   Allergen Reactions   • Hydrocodone Hives   • Oxycodone Hives   • Penicillins Hives   • Sulfa Antibiotics Hives   • Doxycycline    • Lyrica [Pregabalin]        Objective    Physical Exam:   Vital Signs have been reviewed  Physical Exam  Gen.: Elderly white female is oriented to place only.  She thinks that she has been here in the hospital for a month and it is only been 2 days.  She is an unreliable historian  HEENT: NCAT PERRLA EOMI  Abdomen: Is doughy soft and nontender no masses or organomegaly  Genitalia: External are within normal limits on inspection pelvic and bimanual exam is deferred  Extremities: Free of cyanosis clubbing or edema she has no paralysis, but is  weakened from deconditioning.        Results Reviewed:  I have personally reviewed current lab, radiology, and data and agree with results.    Results from last 7 days  Lab Units 03/08/17  0646   WBC 10*3/mm3 11.19*   HEMOGLOBIN g/dL 11.7   PLATELETS 10*3/mm3 239       Results from last 7 days  Lab Units 03/08/17  0646   SODIUM mmol/L 136   POTASSIUM mmol/L 3.8   TOTAL CO2 mmol/L 25.0   BUN mg/dL 13   CREATININE mg/dL 0.70   GLUCOSE mg/dL 148*   CALCIUM mg/dL 10.4           Principal Problem:    Altered mental status,  unspecified  Active Problems:    Hypertension    Carotid stenosis    Cerebrovascular accident (CVA)    Hypertensive emergency        Assessment/Plan   Assessment & Plan : Urinary retention.  This probably been a chronic ongoing problem at home where she empties poorly but does void some in the commode.  We will continue to do prompted voiding with assistance to bedside commode and yet due every shift in and out catheterizations to check her residual volumes.   I discussed the patients findings and my recommendations with: Her nurse staff

## 2017-03-08 NOTE — PROGRESS NOTES
Continued Stay Note  [facility]     Patient Name: Loni Walker  MRN: [id number]  Today's Date: 3/8/2017    Admit Date: 3/6/2017          Discharge Plan     Consent obtained to participate in the [facility]. Yasmine Lorenzo RN                Discharge Codes     None        Expected Discharge Date and Time     Expected Discharge Date Expected Discharge Time    Mar 11, 2017             Yasmine Lorenzo RN

## 2017-03-08 NOTE — PROGRESS NOTES
Chart reviewed again, reviewed with Dr Valle, change back to observation status .   Maikol Arteaga MD

## 2017-03-08 NOTE — PROGRESS NOTES
[facility] Medicine Services    ASSESSMENT / PLAN          Principal Problem:  Hypertensive emergency  with HTN encephalopathy/altered mental status, unspecified, hypertensive encephalopathy, MRI negative for any acute stroke, status post evaluation by neurologist, exelon patch is being started, family is requesting a rehabilitation placement, blood pressure control improved.  Continue Plavix and HD statin as does have history of recurrent strokes in the past.    Diarrhea, multiple stools, monitor, negative for c diff   Hypertension, see above  History of Carotid stenosis, repeat carotid duplex ,CT angiogram head reviewed reviewed with microvascular changes and probable old lacunar infarcts  H/o recurrent Cerebrovascular accident (CVA), continue plavix/statin, possible component of vascular dementia      -NIHSS Qshift  -Neuro checks every 4 hours  -IV hydralazine PRN   -Home meds     DVT prophylaxis: SHABBIR/SCDs  Code Status: Full  Length of Stay 0 Days   Expected Discharge disposition:  Looking for long term placement    SUBJECTIVE--HPI/ Events overnight / CC- Hospital Follow up visit/ ROS-not detailed ,  as performed below    Feel ok, confused, not able to give history, no issues overnight per nursing  Gen -no fevers, chills  CV-no chest pain, palpitations  Resp-no cough, dyspnea  GI-no N/V/D, abd pain      OBJECTIVE        Vital Sign Min/Max for last 24 hours  Temp  Min: 98 °F (36.7 °C)  Max: 98.6 °F (37 °C)   BP  Min: 129/46  Max: 168/68   Pulse  Min: 80  Max: 109   Resp  Min: 16  Max: 18   SpO2  Min: 94 %  Max: 96 %   No Data Recorded      Intake/Output Summary (Last 24 hours) at 03/08/17 1144  Last data filed at 03/08/17 0900   Gross per 24 hour   Intake    720 ml   Output    800 ml   Net    -80 ml         Sitting up in bed  Gen/Constitutional-no acute distress, pleasantly confused  CV-RRR, S1 S2 normal, 2/6 sys m urmur  Resp-CTAB no wheezes  Abd-soft, NT, ND, +BS  Ext-no  edema  Neuro-A&Ox1, moving all extremities, sensations seem to be intact, limited exam due to inability to follow all directions, follows simple commands, no focal deficits  Psych-appropriate mood      Medications    Current Facility-Administered Medications:   •  acetaminophen (TYLENOL) tablet 650 mg, 650 mg, Oral, Q4H PRN, Brianda Fountain, APRN  •  aspirin tablet 325 mg, 325 mg, Oral, Daily, 325 mg at 03/08/17 0810 **OR** aspirin suppository 300 mg, 300 mg, Rectal, Daily, Brianda Fountain APRN  •  atenolol (TENORMIN) tablet 50 mg, 50 mg, Oral, Q12H, Brianda Fountain, APRN, 50 mg at 03/08/17 0810  •  atorvastatin (LIPITOR) tablet 80 mg, 80 mg, Oral, Nightly, Brianda Fountain, APRN, 80 mg at 03/07/17 2016  •  bisacodyl (DULCOLAX) suppository 10 mg, 10 mg, Rectal, Daily PRN, Brianda Fountain APRN  •  calcium carbonate (TUMS) chewable tablet 500 mg (200 mg elemental), 2 tablet, Oral, BID PRN, Brianda Fountain APRN  •  clopidogrel (PLAVIX) tablet 75 mg, 75 mg, Oral, Daily, Brianda Fountain, APRN, 75 mg at 03/08/17 0810  •  diltiaZEM CD (CARDIZEM CD) 24 hr capsule 240 mg, 240 mg, Oral, Nightly, Brianda Fountain, APRN, 240 mg at 03/07/17 2016  •  doxazosin (CARDURA) tablet 2 mg, 2 mg, Oral, Nightly, Brianda Fountain APRN, 2 mg at 03/07/17 2016  •  FLUoxetine (PROzac) capsule 20 mg, 20 mg, Oral, Daily, Brianda Sermarco, APRN, 20 mg at 03/08/17 0810  •  haloperidol lactate (HALDOL) injection 2 mg, 2 mg, Intravenous, Q6H PRN, Jaimie Guerra II, DO, 2 mg at 03/07/17 1806  •  hydrALAZINE (APRESOLINE) injection 10 mg, 10 mg, Intravenous, Q6H PRN, Brianda Fountain APRN, 10 mg at 03/07/17 1242  •  LORazepam (ATIVAN) injection 0.5 mg, 0.5 mg, Intravenous, Q6H PRN, Jaimie Guerra II, DO, 0.5 mg at 03/07/17 1244  •  magnesium oxide (MAGOX) tablet 400 mg, 400 mg, Oral, BID, Brianda Fountain, APRN, 400 mg at 03/08/17 0810  •  magnesium sulfate in D5W 1g/100mL (PREMIX) IVPB 1 g, 1 g, Intravenous, PRN **OR** magnesium sulfate 8 g in dextrose (D5W) 5 % 250 mL infusion, 8 g,  Intravenous, PRN **OR** magnesium sulfate 6 g in dextrose (D5W) 5 % 250 mL infusion, 6 g, Intravenous, PRN **OR** magnesium sulfate in D5W 1g/100mL (PREMIX) IVPB 1 g, 1 g, Intravenous, PRN, Brianda Sermarco, APRN  •  methylphenidate (RITALIN) tablet 5 mg, 5 mg, Oral, Daily, Brianda Serey, APRN, 5 mg at 03/08/17 0809  •  ondansetron (ZOFRAN) tablet 4 mg, 4 mg, Oral, Q6H PRN **OR** ondansetron (ZOFRAN) injection 4 mg, 4 mg, Intravenous, Q6H PRN, Brianda Fountain, APRN  •  pantoprazole (PROTONIX) EC tablet 40 mg, 40 mg, Oral, Daily, Brianda Sermarco, APRN, 40 mg at 03/08/17 0810  •  potassium chloride (MICRO-K) CR capsule 40 mEq, 40 mEq, Oral, PRN, 40 mEq at 03/07/17 1235 **OR** potassium chloride (KLOR-CON) packet 40 mEq, 40 mEq, Oral, PRN **OR** potassium chloride 10 mEq in 100 mL IVPB, 10 mEq, Intravenous, Q1H PRN, Brianda Fountain, APRN  •  rivastigmine (EXELON) 4.6 MG/24HR patch 1 patch, 1 patch, Transdermal, Daily, Jaimie Holguin MD, 1 patch at 03/08/17 0810  •  sennosides-docusate sodium (SENOKOT-S) 8.6-50 MG tablet 2 tablet, 2 tablet, Oral, BID PRN, Brianda Fountain, APRN  •  sodium chloride 0.9 % flush 1-10 mL, 1-10 mL, Intravenous, PRN, Brianda Sermarco, APRN, 10 mL at 03/07/17 0822  I have reviewed the labs, culture data, radiology results, and diagnostic studies.    Results from last 7 days  Lab Units 03/08/17  0646 03/07/17  1639 03/07/17  0600 03/06/17  1405  03/05/17  2234   SODIUM mmol/L 136  --  139 139  --   --    POTASSIUM mmol/L 3.8 4.3 3.6 3.8  < >  --    CHLORIDE mmol/L 104  --  104 104  --   --    TOTAL CO2 mmol/L 25.0  --  25.0 28.0  --   --    BUN mg/dL 13  --  14 16  --   --    CREATININE mg/dL 0.70  --  0.60 0.70  --   --    CALCIUM mg/dL 10.4  --  10.7* 10.9*  --   --    BILIRUBIN mg/dL  --   --   --  0.2*  --   --    ALK PHOS U/L  --   --   --  76  --   --    ALT (SGPT) U/L  --   --   --  39  --  41*   AST (SGOT) U/L  --   --   --  29  --  39*   GLUCOSE mg/dL 148*  --  122* 100  --   --    < > = values in this  interval not displayed.    Results from last 7 days  Lab Units 03/08/17  0646 03/07/17  0600 03/06/17  1405   WBC 10*3/mm3 11.19* 10.23 5.86   HEMOGLOBIN g/dL 11.7 12.5 11.8   HEMATOCRIT % 36.4 38.7 36.6   PLATELETS 10*3/mm3 239 254 205           Culture Data:  Cultures:    BLOOD CULTURE   Date Value Ref Range Status   03/05/2017 No growth at 2 days  Preliminary   03/05/2017 No growth at 2 days  Preliminary       Clostridium Difficile Toxin, PCR [[id number]] (Normal) Collected: 03/07/17 1215       Lab Status: Final result Specimen: Stool from Per Rectum Updated: 03/07/17 1440        C. Difficile Toxins by PCR Not Detected       Radiology Results: No new as of  3.8.17    * Please note that portions of this note were completed with a voice recognition program. Efforts were made to edit the dictations, but occasionally words are mistranscribed.    Zenia Villafana, APRN03/08/1711:44 AM

## 2017-03-08 NOTE — PLAN OF CARE
Problem: Patient Care Overview (Adult)  Goal: Plan of Care Review  Outcome: Ongoing (interventions implemented as appropriate)    03/08/17 1435   Coping/Psychosocial Response Interventions   Plan Of Care Reviewed With patient   Outcome Evaluation   Outcome Summary/Follow up Plan Pt initially agreeable to OT tx, then during exercises she got frustrated and reported she needed to be left alone. OT obliged. Cont OT POC    Patient Care Overview   Progress progress toward functional goals is gradual

## 2017-03-09 PROBLEM — N39.0 UTI (URINARY TRACT INFECTION): Status: ACTIVE | Noted: 2017-03-09

## 2017-03-09 PROBLEM — R33.9 URINARY RETENTION: Status: ACTIVE | Noted: 2017-03-09

## 2017-03-09 PROCEDURE — G0378 HOSPITAL OBSERVATION PER HR: HCPCS

## 2017-03-09 PROCEDURE — 99226 PR SBSQ OBSERVATION CARE/DAY 35 MINUTES: CPT | Performed by: NURSE PRACTITIONER

## 2017-03-09 PROCEDURE — 25010000003 CEFTRIAXONE PER 250 MG: Performed by: NURSE PRACTITIONER

## 2017-03-09 PROCEDURE — 97110 THERAPEUTIC EXERCISES: CPT

## 2017-03-09 PROCEDURE — 97116 GAIT TRAINING THERAPY: CPT

## 2017-03-09 RX ORDER — CEFTRIAXONE SODIUM 1 G/50ML
1 INJECTION, SOLUTION INTRAVENOUS
Status: DISCONTINUED | OUTPATIENT
Start: 2017-03-09 | End: 2017-03-12

## 2017-03-09 RX ORDER — LEVOFLOXACIN 5 MG/ML
250 INJECTION, SOLUTION INTRAVENOUS EVERY 24 HOURS
Status: DISCONTINUED | OUTPATIENT
Start: 2017-03-09 | End: 2017-03-09

## 2017-03-09 RX ADMIN — ATENOLOL 50 MG: 50 TABLET ORAL at 21:43

## 2017-03-09 RX ADMIN — DILTIAZEM HYDROCHLORIDE 240 MG: 240 CAPSULE, COATED, EXTENDED RELEASE ORAL at 21:45

## 2017-03-09 RX ADMIN — ASPIRIN 325 MG ORAL TABLET 325 MG: 325 PILL ORAL at 09:38

## 2017-03-09 RX ADMIN — Medication 400 MG: at 17:13

## 2017-03-09 RX ADMIN — Medication 400 MG: at 09:39

## 2017-03-09 RX ADMIN — CLOPIDOGREL BISULFATE 75 MG: 75 TABLET, FILM COATED ORAL at 09:38

## 2017-03-09 RX ADMIN — DOXAZOSIN MESYLATE 2 MG: 1 TABLET ORAL at 21:45

## 2017-03-09 RX ADMIN — ACETAMINOPHEN 650 MG: 325 TABLET, FILM COATED ORAL at 12:55

## 2017-03-09 RX ADMIN — METHYLPHENIDATE HYDROCHLORIDE 5 MG: 5 TABLET ORAL at 09:38

## 2017-03-09 RX ADMIN — RIVASTIGMINE TRANSDERMAL SYSTEM 1 PATCH: 4.6 PATCH, EXTENDED RELEASE TRANSDERMAL at 09:38

## 2017-03-09 RX ADMIN — ATORVASTATIN CALCIUM 80 MG: 40 TABLET, FILM COATED ORAL at 21:43

## 2017-03-09 RX ADMIN — CEFTRIAXONE SODIUM 1 G: 1 INJECTION, SOLUTION INTRAVENOUS at 17:09

## 2017-03-09 RX ADMIN — ATENOLOL 50 MG: 50 TABLET ORAL at 09:39

## 2017-03-09 RX ADMIN — FLUOXETINE HYDROCHLORIDE 20 MG: 20 CAPSULE ORAL at 09:39

## 2017-03-09 RX ADMIN — PANTOPRAZOLE SODIUM 40 MG: 40 TABLET, DELAYED RELEASE ORAL at 09:37

## 2017-03-09 NOTE — CONSULTS
Pharmacy Consult -     Loni Walker is a 77 y.o. female who has been consulted for levaquin dosing for UTI.     Relevant clinical data and objective history reviewed:  CREATININE   Date Value Ref Range Status   03/08/2017 0.70 0.60 - 1.30 mg/dL Final   03/07/2017 0.60 0.60 - 1.30 mg/dL Final     BUN   Date Value Ref Range Status   03/08/2017 13 9 - 23 mg/dL Final   03/07/2017 14 9 - 23 mg/dL Final     Estimated Creatinine Clearance: 46.6 mL/min (by C-G formula based on Cr of 0.7).  I/O last 3 completed shifts:  In: 240 [P.O.:240]  Out: 2075 [Urine:2075]    Temp Readings from Last 3 Encounters:   03/09/17 98.8 °F (37.1 °C) (Oral)   03/05/17 98.5 °F (36.9 °C) (Oral)   11/14/16 98 °F (36.7 °C) (Oral)     Weight: 130 lb (59 kg)      Assessment/Plan  Renal function is stable with a creatinine of 0.7. Estimated CrCl is 45-50 ml/min. Pharmacy will continue to monitor renal function. The usual dose for complicated UTI is 250 mg IV Q24h.  Mg is 2.0 on 3/8. Called pharmacy on profile, no Keflex or cephalosporin on the profile. Pharmacy will place Levaquin 250 mg IV Q24h pending the discussion of GABRIELLE Ayoub with patient's daughter to see if she tolerated Keflex before given the patient multiple risk factors of prolong QTc (age, sex, renal function, DDI prozac and haloperidol).    Thank you,  Sam Donohue, PharmD.  3/9/2017  2:57 PM

## 2017-03-09 NOTE — PROGRESS NOTES
[facility] Medicine Services  INPATIENT PROGRESS NOTE    Date of Admission: 3/6/2017  Length of Stay: 0  Primary Care Physician: No Known Provider    Subjective   CC: hospital follow up    HPI:  No issues overnight per nursing.  Still with retention issues.  No complaints of dysuria, but not a reliable historian.  Culture has been ordered    Review Of Systems:   Review of Systems   Unable to perform ROS: Dementia         Objective      Temp:  [98.5 °F (36.9 °C)-98.8 °F (37.1 °C)] 98.8 °F (37.1 °C)  Heart Rate:  [51-98] 77  Resp:  [16-22] 22  BP: (130-155)/(60-77) 155/60  Physical Exam   Constitutional: She appears well-developed and well-nourished. No distress.   HENT:   Head: Normocephalic.   Eyes: No scleral icterus.   Neck: Neck supple.   Cardiovascular: Normal rate and regular rhythm.  Exam reveals no gallop and no friction rub.    No murmur heard.  Pulmonary/Chest: Effort normal. No respiratory distress. She has no wheezes.   Abdominal: Soft. Bowel sounds are normal. She exhibits no distension.   Neurological: She is alert.   Watching TV  pleasantly confused   Skin: Skin is warm and dry.   Psychiatric: She has a normal mood and affect.   Vitals reviewed.        Results Review:    I have reviewed the labs, radiology results and diagnostic studies.      Results from last 7 days  Lab Units 03/08/17  0646   WBC 10*3/mm3 11.19*   HEMOGLOBIN g/dL 11.7   PLATELETS 10*3/mm3 239       Results from last 7 days  Lab Units 03/08/17  0646   SODIUM mmol/L 136   POTASSIUM mmol/L 3.8   CHLORIDE mmol/L 104   TOTAL CO2 mmol/L 25.0   BUN mg/dL 13   CREATININE mg/dL 0.70   GLUCOSE mg/dL 148*   CALCIUM mg/dL 10.4       Culture Data: Cultures:    BLOOD CULTURE   Date Value Ref Range Status   03/05/2017 No growth at 3 days  Preliminary   03/05/2017 No growth at 3 days  Preliminary       Radiology Data: no new    I have reviewed the medications.    Assessment/Plan     Problem List  Principal Problem:     Altered mental status, unspecified  Active Problems:    Hypertension    Carotid stenosis    Cerebrovascular accident (CVA)    Hypertensive emergency    Urinary retention    UTI (urinary tract infection)           Assessment/Plan:  --start rocephin (tolerated 10d of Augmentin at [facility] in January for URI)  --urology following  --labs in am  --follow urine culture  --follow up with neuro after dc  --BP better    DVT prophylaxis: TEDS/SCDS  Discharge Planning: I expect patient to be discharged to NH once long term placement found.     Zenia Villafana, APRN   03/09/17   2:10 PM    Please note that portions of this note may have been completed with a voice recognition program. Efforts were made to edit the dictations, but occasionally words are mistranscribed.

## 2017-03-09 NOTE — PLAN OF CARE
Problem: Inpatient Physical Therapy  Goal: Bed Mobility Goal LTG- PT  Outcome: Ongoing (interventions implemented as appropriate)    03/07/17 1008 03/09/17 1412   Bed Mobility PT LTG   Bed Mobility PT LTG, Time to Achieve 2 wks --    Bed Mobility PT LTG, Activity Type all bed mobility --    Bed Mobility PT LTG, [place] Level minimum assist (75% patient effort) --    Bed Mobility PT LTG, Outcome --  goal ongoing       Goal: Transfer Training Goal 1 LTG- PT  Outcome: Ongoing (interventions implemented as appropriate)    03/07/17 1008 03/09/17 1412   Transfer Training PT LTG   Transfer Training PT LTG, Time to Achieve 2 wks --    Transfer Training PT LTG, Activity Type all transfers --    Transfer Training PT LTG, [place] Level minimum assist (75% patient effort) --    Transfer Training PT LTG, Assist Device walker, rolling --    Transfer Training PT LTG, Outcome --  goal ongoing       Goal: Gait Training Goal LTG- PT  Outcome: Ongoing (interventions implemented as appropriate)    03/07/17 1008 03/09/17 1412   Gait Training PT LTG   Gait Training Goal PT LTG, Time to Achieve 2 wks --    Gait Training Goal PT LTG, [place] Level moderate assist (50% patient effort) --    Gait Training Goal PT LTG, Assist Device walker, rolling --    Gait Training Goal PT LTG, Distance to Achieve 150 --    Gait Training Goal PT LTG, Outcome --  goal ongoing         Problem: Patient Care Overview (Adult)  Goal: Plan of Care Review  Outcome: Ongoing (interventions implemented as appropriate)    03/09/17 1412   Coping/Psychosocial Response Interventions   Plan Of Care Reviewed With patient   Outcome Evaluation   Outcome Summary/Follow up Plan pt worked on sitting balance,leans to rt side,stood well,took a few steps   Patient Care Overview   Progress no change

## 2017-03-09 NOTE — PROGRESS NOTES
"Daily Progress Note    Patient: [facility] Day: 4  Subjective: Dementia seems a  slight bit improved today.      Objective:     Visit Vitals   • /77 (BP Location: Right arm, Patient Position: Lying)   • Pulse 76   • Temp 98.8 °F (37.1 °C) (Oral)   • Resp 18   • Ht 57.01\" (144.8 cm)   • Wt 130 lb (59 kg)   • LMP  (LMP Unknown)   • SpO2 93%   • BMI 28.12 kg/m2         Intake/Output Summary (Last 24 hours) at 03/09/17 0717  Last data filed at 03/09/17 0600   Gross per 24 hour   Intake    240 ml   Output   1275 ml   Net  -1035 ml       Review of Systems:    Physical Exam:   General Appearance: alert, appears stated age and cooperative  Head: normocephalic, without obvious abnormality and atraumatic  Extremities moves extremities well, no edema, no cyanosis and no redness      Lab Results (last 24 hours)     Procedure Component Value Units Date/Time    CBC & Differential [[id number]] Collected:  03/08/17 0646    Specimen:  Blood Updated:  03/08/17 0754    Narrative:       The following orders were created for panel order CBC & Differential.  Procedure                               Abnormality         Status                     ---------                               -----------         ------                     CBC Auto Differential[[id number]]         Abnormal            Final result                 Please view results for these tests on the individual orders.    CBC Auto Differential [[id number]]  (Abnormal) Collected:  03/08/17 0646    Specimen:  Blood Updated:  03/08/17 0754     WBC 11.19 (H) 10*3/mm3      RBC 3.93 10*6/mm3      Hemoglobin 11.7 g/dL      Hematocrit 36.4 %      MCV 92.6 fL      MCH 29.8 pg      MCHC 32.1 g/dL      RDW 14.5 %      RDW-SD 49.8 fl      MPV 11.4 fL      Platelets 239 10*3/mm3      Neutrophil % 77.3 (H) %      Lymphocyte % 14.7 (L) %      Monocyte % 6.8 %      Eosinophil % 0.6 %      Basophil % 0.3 %      Immature Grans % 0.3 %      Neutrophils, Absolute 8.65 (H) 10*3/mm3     "  Lymphocytes, Absolute 1.65 10*3/mm3      Monocytes, Absolute 0.76 10*3/mm3      Eosinophils, Absolute 0.07 (L) 10*3/mm3      Basophils, Absolute 0.03 10*3/mm3      Immature Grans, Absolute 0.03 10*3/mm3     Magnesium [[id number]]  (Normal) Collected:  03/08/17 0646    Specimen:  Blood Updated:  03/08/17 0810     Magnesium 2.0 mg/dL     Basic Metabolic Panel [[id number]]  (Abnormal) Collected:  03/08/17 0646    Specimen:  Blood Updated:  03/08/17 0810     Glucose 148 (H) mg/dL      BUN 13 mg/dL      Creatinine 0.70 mg/dL      Sodium 136 mmol/L      Potassium 3.8 mmol/L      Chloride 104 mmol/L      CO2 25.0 mmol/L      Calcium 10.4 mg/dL      eGFR Non African Amer 81 mL/min/1.73      BUN/Creatinine Ratio 18.6      Anion Gap 7.0 mmol/L     Narrative:       [employer] Guidelines    Stage                           Description                             GFR                      1                               Normal or High                          90+  2                               Mild decrease                            60-89  3                               Moderate decrease                   30-59  4                               Severe decrease                       15-29  5                               Kidney failure                             <15    RPR [[id number]]  (Normal) Collected:  03/06/17 1405    Specimen:  Blood Updated:  03/08/17 1104     RPR Non-Reactive     Urinalysis With / Microscopic If Indicated [[id number]]  (Abnormal) Collected:  03/08/17 2258    Specimen:  Urine from Urine, Catheter Updated:  03/08/17 2347     Color, UA Yellow      Appearance, UA Cloudy (A)      pH, UA 7.0      Specific Gravity, UA 1.009      Glucose, UA Negative      Ketones, UA Negative      Bilirubin, UA Negative      Blood, UA Negative      Protein, UA Negative      Leuk Esterase, UA Small (1+) (A)      Nitrite, UA Positive (A)      Urobilinogen, UA 0.2 E.U./dL     Urinalysis, Microscopic Only [[id number]]   (Abnormal) Collected:  03/08/17 7532    Specimen:  Urine from Urine, Catheter Updated:  03/08/17 3987     RBC, UA 0-2 /HPF      WBC, UA 3-5 (A) /HPF      Bacteria, UA 4+ (A) /HPF      Squamous Epithelial Cells, UA None Seen /HPF      Hyaline Casts, UA None Seen /LPF      Methodology Manual Light Microscopy               Assessment/Plan: Urinary retention in the face of the decreased mental status.  Continue in and out catheterization every shift.        Patient Active Problem List   Diagnosis   • Hypertension   • CVA (cerebral vascular accident)   • Carotid stenosis   • Constipation   • Cerebrovascular accident (CVA)   • Altered mental status, unspecified   • Hypertensive emergency             Jedbrandi Simmons MD - 3/9/2017, 7:17 AM

## 2017-03-09 NOTE — DISCHARGE PLACEMENT REQUEST
"Jimbo Walker (77 y.o. Female)     Date of Birth Social Security Number Address Home Phone MRN    [id number]  [id number] [address] [phone] [id number]    [employer] Marital Status          None        Admission Date Admission Type Admitting Provider Attending Provider Department, Room/Bed    3/6/17 Emergency Robbin Phillips MD Sloan, Walker E, MD [address], S[address]/1    Discharge Date Discharge Disposition Discharge Destination                      Attending Provider: Robbin Phillips MD     Allergies:  Hydrocodone, Oxycodone, Penicillins, Sulfa Antibiotics, Doxycycline, Lyrica [Pregabalin]    Isolation:  None   Infection:  None   Code Status:  FULL    Ht:  57.01\" (144.8 cm)   Wt:  130 lb (59 kg)    Admission Cmt:  None   Principal Problem:  Altered mental status, unspecified [R41.82]                 Active Insurance as of 3/6/2017     Primary Coverage     Payor Plan Insurance Group Employer/Plan Group    MEDICARE MEDICARE A & B      Payor Plan Address Payor Plan Phone Number Effective From Effective To    [address] [phone] 1/1/2004     [place] [address]       Subscriber Name Subscriber Birth Date Member ID       JIMBO WALKER 1940 [id number]           Secondary Coverage     Payor Plan Insurance Group Employer/Plan Group    AARP MED SUPP AARP HEALTH CARE OPTIONS      Payor Plan Address Payor Plan Phone Number Effective From Effective To    [facility] [phone] 1/1/2016     [id number]       [place] [address]       Subscriber Name Subscriber Birth Date Member ID       JIMBO WALKER 1940 [id number]                 Emergency Contacts      (Rel.) Home Phone Work Phone Mobile Phone    Maribel López (Daughter) -- -- [phone]               Physician Progress Notes (most recent note)      Jed Simmons MD at 3/9/2017  7:17 AM  Version 1 of 1         Daily Progress Note    Patient: Jimbo Walker  Hospital Day: " "4  Subjective: Dementia seems a  slight bit improved today.      Objective:     Visit Vitals   • /77 (BP Location: Right arm, Patient Position: Lying)   • Pulse 76   • Temp 98.8 °F (37.1 °C) (Oral)   • Resp 18   • Ht 57.01\" (144.8 cm)   • Wt 130 lb (59 kg)   • LMP  (LMP Unknown)   • SpO2 93%   • BMI 28.12 kg/m2         Intake/Output Summary (Last 24 hours) at 03/09/17 0717  Last data filed at 03/09/17 0600   Gross per 24 hour   Intake    240 ml   Output   1275 ml   Net  -1035 ml       Review of Systems:    Physical Exam:   General Appearance: alert, appears stated age and cooperative  Head: normocephalic, without obvious abnormality and atraumatic  Extremities moves extremities well, no edema, no cyanosis and no redness      Lab Results (last 24 hours)     Procedure Component Value Units Date/Time    CBC & Differential [[id number]] Collected:  03/08/17 0646    Specimen:  Blood Updated:  03/08/17 0754    Narrative:       The following orders were created for panel order CBC & Differential.  Procedure                               Abnormality         Status                     ---------                               -----------         ------                     CBC Auto Differential[[id number]]         Abnormal            Final result                 Please view results for these tests on the individual orders.    CBC Auto Differential [[id number]]  (Abnormal) Collected:  03/08/17 0646    Specimen:  Blood Updated:  03/08/17 0754     WBC 11.19 (H) 10*3/mm3      RBC 3.93 10*6/mm3      Hemoglobin 11.7 g/dL      Hematocrit 36.4 %      MCV 92.6 fL      MCH 29.8 pg      MCHC 32.1 g/dL      RDW 14.5 %      RDW-SD 49.8 fl      MPV 11.4 fL      Platelets 239 10*3/mm3      Neutrophil % 77.3 (H) %      Lymphocyte % 14.7 (L) %      Monocyte % 6.8 %      Eosinophil % 0.6 %      Basophil % 0.3 %      Immature Grans % 0.3 %      Neutrophils, Absolute 8.65 (H) 10*3/mm3      Lymphocytes, Absolute 1.65 10*3/mm3      Monocytes, " Absolute 0.76 10*3/mm3      Eosinophils, Absolute 0.07 (L) 10*3/mm3      Basophils, Absolute 0.03 10*3/mm3      Immature Grans, Absolute 0.03 10*3/mm3     Magnesium [[id number]]  (Normal) Collected:  03/08/17 0646    Specimen:  Blood Updated:  03/08/17 0810     Magnesium 2.0 mg/dL     Basic Metabolic Panel [[id number]]  (Abnormal) Collected:  03/08/17 0646    Specimen:  Blood Updated:  03/08/17 0810     Glucose 148 (H) mg/dL      BUN 13 mg/dL      Creatinine 0.70 mg/dL      Sodium 136 mmol/L      Potassium 3.8 mmol/L      Chloride 104 mmol/L      CO2 25.0 mmol/L      Calcium 10.4 mg/dL      eGFR Non African Amer 81 mL/min/1.73      BUN/Creatinine Ratio 18.6      Anion Gap 7.0 mmol/L     Narrative:       National Kidney [employer] Guidelines    Stage                           Description                             GFR                      1                               Normal or High                          90+  2                               Mild decrease                            60-89  3                               Moderate decrease                   30-59  4                               Severe decrease                       15-29  5                               Kidney failure                             <15    RPR [[id number]]  (Normal) Collected:  03/06/17 1405    Specimen:  Blood Updated:  03/08/17 1104     RPR Non-Reactive     Urinalysis With / Microscopic If Indicated [[id number]]  (Abnormal) Collected:  03/08/17 2258    Specimen:  Urine from Urine, Catheter Updated:  03/08/17 2347     Color, UA Yellow      Appearance, UA Cloudy (A)      pH, UA 7.0      Specific Gravity, UA 1.009      Glucose, UA Negative      Ketones, UA Negative      Bilirubin, UA Negative      Blood, UA Negative      Protein, UA Negative      Leuk Esterase, UA Small (1+) (A)      Nitrite, UA Positive (A)      Urobilinogen, UA 0.2 E.U./dL     Urinalysis, Microscopic Only [[id number]]  (Abnormal) Collected:  03/08/17 2258    Specimen:  Urine  from Urine, Catheter Updated:  03/08/17 2347     RBC, UA 0-2 /HPF      WBC, UA 3-5 (A) /HPF      Bacteria, UA 4+ (A) /HPF      Squamous Epithelial Cells, UA None Seen /HPF      Hyaline Casts, UA None Seen /LPF      Methodology Manual Light Microscopy               Assessment/Plan: Urinary retention in the face of the decreased mental status.  Continue in and out catheterization every shift.        Patient Active Problem List   Diagnosis   • Hypertension   • CVA (cerebral vascular accident)   • Carotid stenosis   • Constipation   • Cerebrovascular accident (CVA)   • Altered mental status, unspecified   • Hypertensive emergency             Jed Simmons MD - 3/9/2017, 7:17 AM         Electronically signed by Jed Simmons MD at 3/9/2017  7:19 AM           Nursing Assessments (last 24 hours)      Adult PCS Body System       03/08/17 0939 03/08/17 1000 03/08/17 1200 03/08/17 1400 03/08/17 1434    Pain/Comfort/Sleep    Presence Of Pain  denies pain/discomfort denies pain/discomfort denies pain/discomfort     Pain Rating (0-10): Rest  0 0 0     Sleep/Rest/Relaxation  awake awake awake     Family/Support System    Family Member/Support Person(s) family family family family     Involvement in Care at bedside at bedside at bedside at bedside     ECG    Lead Monitored  Lead III Lead III Lead III     Sinus Rhythm  sinus tachycardia;normal sinus rhythm normal sinus rhythm normal sinus rhythm     Genitourinary    Genitourinary WDL    general symptoms     Voiding Characteristics    other (see comments)   urinary retention - see bladder scan      Urine Characteristics    clear;vazquez     Signs/Symptoms    bladder fullness     Peripheral IV Line - Single Lumen 03/07/17 0532 basilic vein (medial side of arm), right 20 gauge    Peripheral IV Line - Properties Group Placement Date: 03/07/17 Placement Time: 0532 Location: basilic vein (medial side of arm), right Device/Lot Number: over-the-needle catheter system Gauge/Length:  20 gauge    Site Preparation/Maintenance  dressing: dry and intact dressing: dry and intact dressing: dry and intact     Phlebitis  0-->no symptoms 0-->no symptoms 0-->no symptoms     Infiltration  0-->no symptoms 0-->no symptoms 0-->no symptoms     Safety    Safety WDL  WDL WDL      Safety Interventions    Safety Promotion/Fall Prevention  safety round/check completed safety round/check completed safety round/check completed     All Alarms  alarm(s) activated and audible alarm(s) activated and audible alarm(s) activated and audible     Daily Care    Activity Type  activity adjusted per tolerance activity adjusted per tolerance;up in chair activity adjusted per tolerance     Activity Level of Assistance   assistance, 2 people      Positioning    Positioning  semi-Fowlers;weight shift assistance provided high Fowlers;weight shift assistance provided semi-Fowlers;weight shift assistance provided     Hygiene Care    Perineal Care    cleansed;absorbent pad changed     Provider Notification    Reason for Communication    Evaluate   urinary retention Evaluate    Provider Name    JUDAH Arteaga anna    Notification Route    Phone call Phone call    Response    See orders See orders      03/08/17 1600 03/08/17 1800 03/08/17 1935 03/08/17 2155 03/09/17 0050    Pain/Comfort/Sleep    Presence Of Pain non-verbal indicator of pain/discomfort not present non-verbal indicator of pain/discomfort not present denies pain/discomfort denies pain/discomfort non-verbal indicator of pain/discomfort not present    Preferred Pain Scale   word (verbal rating pain scale) word (verbal rating pain scale)     Pain Rating (0-10): Rest 0 0 -- --     Sleep/Rest/Relaxation appears asleep awake awake awake appears asleep    Coping/Psychosocial    Observed Emotional State    accepting;calm;cooperative     Verbalized Emotional State    acceptance     Plan Of Care Reviewed With    patient     Family/Support System    Family Member/Support Person(s)  family family  family     Involvement in Care not present at bedside not present at bedside  not present at bedside     HEENT    HEENT WDL    WDL     Cognitive    Cognitive/Neuro/Behavioral WDL     WDL except;orientation     Arousal Level    opens eyes spontaneously     Orientation    disoriented to;place;time;situation     Speech    spontaneous     Mood/Behavior    calm;cooperative     Pupils    Pupil PERRLA    yes     [place] Coma Scale    Best Eye Response    4-->(E4) spontaneous     Best Motor Response    6-->(M6) obeys commands     Best Verbal Response    4-->(V4) confused     Gutierrez Coma Scale Score    14     Motor Response    Motor Response    General     General Motor Response    purposeful motor response     Respiratory    Respiratory WDL    WDL     Rhythm/Pattern (Respiratory)   depth regular;pattern regular;unlabored  depth regular;pattern regular;unlabored    Oxygen Therapy    O2 Device   room air room air room air    Cardiac    Cardiac WDL    WDL     ECG    Lead Monitored Lead III Lead III       Sinus Rhythm normal sinus rhythm normal sinus rhythm normal sinus rhythm normal sinus rhythm normal sinus rhythm    Peripheral Neurovascular    Peripheral Neurovascular WDL    WDL     Gastrointestinal    GI WDL     WDL except;gi symptoms     GI Signs/Symptoms    fecal incontinence     Last Bowel Movement    03/08/17     Genitourinary    Genitourinary WDL     WDL except;voiding ability/characteristics     Voiding Characteristics    incontinence   urinary retention, at times      Incontinence Containment Product    volume pad, large     Skin    Skin WDL    WDL     Mak Risk Assessment (If Mak score </= 18, add the appropriate CPG to the care plan)    Sensory Perception    3-->slightly limited     Moisture    3-->occasionally moist     Activity    2-->chairfast     Mobility    3-->slightly limited     Nutrition    3-->adequate     Friction and Shear    2-->potential problem     Mak Score    16     Skin  Interventions    Pressure Reduction Devices    pressure-redistributing mattress utilized     Pressure Reduction Techniques    frequent weight shift encouraged;heels elevated off bed;weight shift assistance provided     Skin Protection    adhesive use limited;incontinence pads utilized;pulse oximeter probe site changed;tubing/devices free from skin contact     Musculoskeletal    Musculoskeletal WDL     WDL except;mobility     General Mobility    moderately impaired     Functional Screen Current    Ambulation    3-->assistive equipment and person     Transferring    3-->assistive equipment and person     Toileting    4-->completely dependent     Bathing    4-->completely dependent     Dressing    4-->completely dependent     Eating    0-->independent     Communication    0-->understands/communicates without difficulty     Swallowing (if score 2 or more for any item, consult Rehab Services)    0-->swallows foods/liquids without difficulty     Nutrition    Diet/Nutrition Prescription    regular;other (see comments)   cardiac     Peripheral IV Line - Single Lumen 03/07/17 0532 basilic vein (medial side of arm), right 20 gauge    Peripheral IV Line - Properties Group Placement Date: 03/07/17 Placement Time: 0532 Location: basilic vein (medial side of arm), right Device/Lot Number: over-the-needle catheter system Gauge/Length: 20 gauge    Indication/Daily Review of Necessity    medication therapy intermittent     Site Preparation/Maintenance dressing: dry and intact dressing: dry and intact  dressing: dry and intact     Patency/Maintenance    flushed without difficulty     Pump Type and Serial Number    infusion pump     Phlebitis 0-->no symptoms 0-->no symptoms  0-->no symptoms     Infiltration 0-->no symptoms 0-->no symptoms  0-->no symptoms     Sepsis Screening Tool    Previous screen positive?    no     Are 2 or > of the above criteria present?    no: STOP/negative screen     Safety    Safety WDL WDL WDL WDL WDL WDL     [facility] High Risk Falls Assessment (If Fall score is >/=13, add the Fall Risk CPG to the care plan)     Fallen in past 6 months    0--> No     Mental Status    1--> confused     Elimination    0--> No elimination issues     Mobility    2--> Requires assistance- transfer, walker, etc.     Medications    0--> No meds     Nurses' Clinical Judgement    5     Total Fall Risk Score    10     Safety Interventions    Safety Promotion/Fall Prevention safety round/check completed safety round/check completed safety round/check completed;fall prevention program maintained;nonskid shoes/slippers when out of bed safety round/check completed safety round/check completed;fall prevention program maintained;nonskid shoes/slippers when out of bed    All Alarms alarm(s) activated and audible alarm(s) activated and audible alarm(s) activated and audible alarm(s) activated and audible alarm(s) activated and audible    Safety/Security Measures   bed alarm set bed alarm set bed alarm set    Medication Review/Management    medications reviewed     Environmental Safety Modification   assistive device/personal items within reach;clutter free environment maintained;lighting adjusted;room organization consistent assistive device/personal items within reach;clutter free environment maintained;lighting adjusted;room organization consistent assistive device/personal items within reach;clutter free environment maintained;lighting adjusted;room organization consistent    Elopement Precautions   bed alarm bed alarm bed alarm    Daily Care    Activity Type activity adjusted per tolerance activity adjusted per tolerance activity adjusted per tolerance activity adjusted per tolerance activity adjusted per tolerance    Activity Level of Assistance   assistance, 2 people assistance, 2 people assistance, 2 people    Positioning    Positioning semi-Fowlers;weight shift assistance provided semi-Fowlers;weight shift assistance provided  weight shift  assistance provided weight shift assistance provided    Positioning/Transfer Devices    pillows;in use pillows;in use      03/09/17 0245 03/09/17 0445 03/09/17 0555          Pain/Comfort/Sleep    Presence Of Pain non-verbal indicator of pain/discomfort not present non-verbal indicator of pain/discomfort not present denies pain/discomfort      Preferred Pain Scale   number (Numeric Rating Pain Scale)      Pain Rating (0-10): Rest   0      Sleep/Rest/Relaxation appears asleep appears asleep sleeping between care      Respiratory    Rhythm/Pattern (Respiratory) depth regular;pattern regular;unlabored depth regular;pattern regular;unlabored depth regular;pattern regular;unlabored      Oxygen Therapy    O2 Device room air room air room air      ECG    Sinus Rhythm normal sinus rhythm normal sinus rhythm normal sinus rhythm      Peripheral IV Line - Single Lumen 03/07/17 0532 basilic vein (medial side of arm), right 20 gauge    Peripheral IV Line - Properties Group Placement Date: 03/07/17 Placement Time: 0532 Location: basilic vein (medial side of arm), right Device/Lot Number: over-the-needle catheter system Gauge/Length: 20 gauge    Safety    Safety WDL WDL WDL WDL      Safety Interventions    Safety Promotion/Fall Prevention safety round/check completed;fall prevention program maintained;nonskid shoes/slippers when out of bed safety round/check completed;fall prevention program maintained;nonskid shoes/slippers when out of bed safety round/check completed;fall prevention program maintained;nonskid shoes/slippers when out of bed      All Alarms alarm(s) activated and audible alarm(s) activated and audible alarm(s) activated and audible      Safety/Security Measures bed alarm set bed alarm set bed alarm set      Environmental Safety Modification assistive device/personal items within reach;clutter free environment maintained;lighting adjusted;room organization consistent assistive device/personal items within  reach;clutter free environment maintained;lighting adjusted;room organization consistent assistive device/personal items within reach;clutter free environment maintained;lighting adjusted;room organization consistent      Elopement Precautions bed alarm bed alarm       Daily Care    Activity Type activity adjusted per tolerance activity adjusted per tolerance activity adjusted per tolerance      Activity Level of Assistance assistance, 2 people assistance, 2 people assistance, 2 people      Elimination Assistance   up to bedside commode      Positioning    Positioning weight shift assistance provided weight shift assistance provided weight shift assistance provided      Positioning/Transfer Devices pillows;in use pillows;in use pillows;in use

## 2017-03-09 NOTE — PROGRESS NOTES
Acute Care - Physical Therapy Treatment Note  [facility]     Patient Name: Loni Walker  : 1940  MRN: [id number]  Today's Date: 3/9/2017  Onset of Illness/Injury or Date of Surgery Date: 17  Date of Referral to PT: 17  Referring Physician: GABRIELLE GASPAR    Admit Date: 3/6/2017    Visit Dx:    ICD-10-CM ICD-9-CM   1. Altered mental status, unspecified R41.82 780.97   2. Weakness R53.1 780.79   3. FTT (failure to thrive) in adult R62.7 783.7   4. Impaired mobility and ADLs Z74.09 799.89   5. Impaired functional mobility, balance, gait, and endurance Z74.09 V49.89     Patient Active Problem List   Diagnosis   • Hypertension   • CVA (cerebral vascular accident)   • Carotid stenosis   • Constipation   • Cerebrovascular accident (CVA)   • Altered mental status, unspecified   • Hypertensive emergency   • Urinary retention   • UTI (urinary tract infection)               Adult Rehabilitation Note       17 1330 17 1400 17 0900    Rehab Assessment/Intervention    Discipline physical therapy assistant  -UD occupational therapist  -MC speech language pathologist  -AV    Document Type therapy note (daily note)  -UD therapy note (daily note)  -MC therapy note (daily note)  -AV    Subjective Information agree to therapy;complains of;weakness  -UD agree to therapy   Pt initially agreed to tx, then during tx refused  -MC no complaints  -AV    Patient Effort, Rehab Treatment adequate  -UD poor  -MC     Precautions/Limitations fall precautions  -UD fall precautions   Exit alarm  -MC     Recorded by [UD] Sofía Cade PTA [MC] Devika Tejeda OT [AV] Emilia Coleman MS CCC-SLP    Vital Signs    O2 Delivery Post Treatment room air  -UD      Pre Patient Position Supine  -UD      Intra Patient Position Standing  -UD      Post Patient Position Sitting  -UD      Recorded by [UD] Sofía Cade PTA      Pain Assessment    Pain Assessment Pierce-Vieyra FACES  -UD No/denies pain  -MC No/denies pain  -AV     Pierce-Baker FACES Pain Rating 0  -UD      Recorded by [UD] Sofía Cade PTA [MC] Devika Tejeda, OT [AV] Emilia Coleman MS CCC-SLP    Cognitive Assessment/Intervention    Current Cognitive/Communication Assessment  impaired  -     Orientation Status oriented to;person;situation  -UD oriented to;person;place;time;other (see comments)   With choices  -     Follows Commands/Answers Questions 50% of the time  -UD 25% of the time  -     Personal Safety Interventions fall prevention program maintained  -UD      Recorded by [UD] Sofía Cade PTA [MC] Devika Tejeda, OT     Improve ability to comprehend words/phrases/sentences    Improve ability to comprehend words/phrases/sentences through:   identify objects, field of;80%  -AV    Ability to Comprehend Words/Phrases/Sentences Progress   50%;with consistent cues  -AV    Recorded by   [AV] Emilia Coleman MS CCC-SLP    Improve ability to follow directions    Improve ability to follow directions:   one-step directions with objects;one-step directions without objects;demonstrate function of object;80%  -AV    Ability to Follow Directions Progress   60%;with inconsistent cues  -AV    Recorded by   [AV] Emilia Coleman MS CCC-SLP    Improve ability to comprehend questions    Improve ability to comprehend questions:   complex yes/no questions;80%  -AV    Ability to Comprehend Questions Progress   40%;with inconsistent cues  -AV    Recorded by   [AV] Emilia Coleman MS CCC-SLP    Improve word retrieval skills    Improve word retrieval skills by:   naming an object/pic;80%  -AV    Word Retrieval Skills Progress   70%;with inconsistent cues  -AV    Recorded by   [AV] Emilia Coleman MS CCC-SLP    Improve ability to construct phrase and sentence level responses    Improve ability to construct phrase and sentence level responses by:   answering question with phrase;80%  -AV    Constructing Phrase and Sentence Level Responses Progress   50%;with consistent cues  -AV     Recorded by   [AV] Emilia Coleman MS CCC-SLP    Bed Mobility, Assessment/Treatment    Bed Mob, Supine to Sit, [place] moderate assist (50% patient effort);maximum assist (25% patient effort);2 person assist required  -UD      Bed Mobility, Comment  Refused  -MC     Recorded by [UD] Sofía Cade PTA [MC] Devika Tejeda OT     Transfer Assessment/Treatment    Transfers, Bed-Chair [place] moderate assist (50% patient effort);maximum assist (25% patient effort);2 person assist required  -UD      Transfers, Sit-Stand [place] moderate assist (50% patient effort);maximum assist (25% patient effort);2 person assist required  -UD      Transfers, Stand-Sit [place] moderate assist (50% patient effort);maximum assist (25% patient effort);2 person assist required  -UD      Recorded by [UD] Sofía Cade PTA      Gait Assessment/Treatment    Gait, [place] Level moderate assist (50% patient effort);2 person assist required  -UD      Gait, Distance (Feet) 5   to chair only  -UD      Gait, Gait Deviations narrow base;step length decreased   leans to rt  -UD      Gait, Safety Issues step length decreased  -UD      Gait, Impairments strength decreased;impaired balance  -UD      Recorded by [UD] Sofía Cade PTA      Balance Skills Training    Sitting-Level of Assistance Minimum assistance;x2  -UD      Sitting-Balance Activities Forward lean;Lateral lean  -UD      Recorded by [UD] Sofía Cade PTA      Therapy Exercises    Bilateral Lower Extremities PROM:;AAROM:;10 reps;sitting  -UD AAROM:;10 reps  -MC     Bilateral Upper Extremity AAROM:;10 reps;sitting  -UD --   Refused  -MC     Recorded by [UD] Sofía Cade PTA [MC] Devika Tejeda, OT     Positioning and Restraints    Pre-Treatment Position in bed  -UD in bed  -MC     Post Treatment Position chair  -UD bed  -MC     In Bed  notified nsg;supine;call light within reach;encouraged to call for assist;exit alarm on;side rails up x3  -MC     In Chair notified  nsg;reclined;sitting;call light within reach;exit alarm on;legs elevated  -UD      Recorded by [UD] Sofía Cade PTA [MC] Devika Tejeda, OT       User Key  (r) = Recorded By, (t) = Taken By, (c) = Cosigned By    Initials Name Effective Dates    FILI Cade PTA 06/22/15 -      Devika Tejeda, OT 03/14/16 -     AV Emilia Coleman, MS CCC-SLP 03/14/16 -                 IP PT Goals       03/09/17 1412 03/07/17 1008       Bed Mobility PT LTG    Bed Mobility PT LTG, Time to Achieve  2 wks  -CD     Bed Mobility PT LTG, Activity Type  all bed mobility  -CD     Bed Mobility PT LTG, [place] Level  minimum assist (75% patient effort)  -CD     Bed Mobility PT LTG, Outcome goal ongoing  -UD      Transfer Training PT LTG    Transfer Training PT LTG, Time to Achieve  2 wks  -CD     Transfer Training PT LTG, Activity Type  all transfers  -CD     Transfer Training PT LTG, [place] Level  minimum assist (75% patient effort)  -CD     Transfer Training PT LTG, Assist Device  walker, rolling  -CD     Transfer Training PT LTG, Outcome goal ongoing  -UD      Gait Training PT LTG    Gait Training Goal PT LTG, Time to Achieve  2 wks  -CD     Gait Training Goal PT LTG, [place] Level  moderate assist (50% patient effort)  -CD     Gait Training Goal PT LTG, Assist Device  walker, rolling  -CD     Gait Training Goal PT LTG, Distance to Achieve  150  -CD     Gait Training Goal PT LTG, Outcome goal ongoing  -UD        User Key  (r) = Recorded By, (t) = Taken By, (c) = Cosigned By    Initials Name Provider Type    FILI Cade PTA Physical Therapy Assistant    MELISSA Aleman PT Physical Therapist          Physical Therapy Education     Title: PT OT SLP Therapies (Active)     Topic: Physical Therapy (Active)     Point: Mobility training (Active)    Learning Progress Summary    Learner Readiness Method Response Comment Documented by Status   Patient Acceptance D,E NR  UD 03/09/17 1411 Active    Acceptance E VU,NR BENEFITS  OF OOB ACTIVITY, SAFETY WITH MOBILITY, POSITIONING, D/C PLANNING.  03/07/17 1007 Done               Point: Home exercise program (Active)    Learning Progress Summary    Learner Readiness Method Response Comment Documented by Status   Patient Acceptance D,E NR   03/09/17 1411 Active    Acceptance E VU,NR BENEFITS OF OOB ACTIVITY, SAFETY WITH MOBILITY, POSITIONING, D/C PLANNING.  03/07/17 1007 Done               Point: Body mechanics (Active)    Learning Progress Summary    Learner Readiness Method Response Comment Documented by Status   Patient Acceptance D,E NR   03/09/17 1411 Active    Acceptance E VU,NR BENEFITS OF OOB ACTIVITY, SAFETY WITH MOBILITY, POSITIONING, D/C PLANNING.  03/07/17 1007 Done               Point: Precautions (Active)    Learning Progress Summary    Learner Readiness Method Response Comment Documented by Status   Patient Acceptance D,E NR   03/09/17 1411 Active    Acceptance E VU,NR BENEFITS OF OOB ACTIVITY, SAFETY WITH MOBILITY, POSITIONING, D/C PLANNING.  03/07/17 1007 Done                      User Key     Initials Effective Dates Name Provider Type Discipline     06/22/15 -  Sofía Cade, PTA Physical Therapy Assistant PT     06/19/15 -  Sheri Aleman PT Physical Therapist PT                    PT Recommendation and Plan  Anticipated Discharge Disposition: skilled nursing facility  PT Frequency: daily  Plan of Care Review  Plan Of Care Reviewed With: patient  Progress: no change  Outcome Summary/Follow up Plan: pt worked on sitting balance,leans to rt side,stood well,took a few steps          Outcome Measures       03/09/17 1330 03/08/17 1400 03/07/17 0822    How much help from another person do you currently need...    Turning from your back to your side while in flat bed without using bedrails? 2  -UD  2  -CD    Moving from lying on back to sitting on the side of a flat bed without bedrails? 2  -UD  2  -CD    Moving to and from a bed to a chair (including a  wheelchair)? 2  -UD  2  -CD    Standing up from a chair using your arms (e.g., wheelchair, bedside chair)? 2  -UD  2  -CD    Climbing 3-5 steps with a railing? 1  -UD  1  -CD    To walk in hospital room? 2  -UD  2  -CD    AM-PAC 6 Clicks Score 11  -UD  11  -CD    How much help from another is currently needed...    Putting on and taking off regular lower body clothing?  2  -MC     Bathing (including washing, rinsing, and drying)  2  -MC     Toileting (which includes using toilet bed pan or urinal)  2  -MC     Putting on and taking off regular upper body clothing  2  -MC     Taking care of personal grooming (such as brushing teeth)  2  -MC     Eating meals  2  -MC     Score  12  -MC     Functional Assessment    Outcome Measure Options  AM-PAC 6 Clicks Daily Activity (OT)  - AM-PAC 6 Clicks Basic Mobility (PT)  -CD      03/07/17 0821          How much help from another is currently needed...    Putting on and taking off regular lower body clothing? 2  -AN      Bathing (including washing, rinsing, and drying) 2  -AN      Toileting (which includes using toilet bed pan or urinal) 2  -AN      Putting on and taking off regular upper body clothing 2  -AN      Taking care of personal grooming (such as brushing teeth) 2  -AN      Eating meals 2  -AN      Score 12  -AN      Modified Atlas Scale    Modified Atlas Scale 4 - Moderately severe disability.  Unable to walk without assistance, and unable to attend to own bodily needs without assistance.  -AN      Functional Assessment    Outcome Measure Options AM-PAC 6 Clicks Daily Activity (OT);Modified Atlas  -AN        User Key  (r) = Recorded By, (t) = Taken By, (c) = Cosigned By    Initials Name Provider Type    FILI Cade, PTA Physical Therapy Assistant    MELISSA Aleman, PT Physical Therapist    KAR Mullen, OT Occupational Therapist    JERZY Tejeda, OT Occupational Therapist           Time Calculation:         PT Charges       03/09/17 1854          Time  Calculation    PT Received On 03/09/17  -UD      PT Goal Re-Cert Due Date 03/17/17  -UD      Time Calculation- PT    Total Timed Code Minutes- PT 25 minute(s)  -UD        User Key  (r) = Recorded By, (t) = Taken By, (c) = Cosigned By    Initials Name Provider Type    UD Sofía Cade PTA Physical Therapy Assistant          Therapy Charges for Today     Code Description Service Date Service Provider Modifiers Qty    88145973476 HC PT THER PROC EA 15 MIN 3/9/2017 Sofía Cade, DONTA GP 1    06432678589 HC GAIT TRAINING EA 15 MIN 3/9/2017 Sofía Cade PTA GP 1    75151748249 HC PT THER SUPP EA 15 MIN 3/9/2017 Sofía Cade, DONTA GP 1          PT G-Codes  Outcome Measure Options: AM-PAC 6 Clicks Daily Activity (OT)    Sofía Cade PTA  3/9/2017

## 2017-03-09 NOTE — PLAN OF CARE
Problem: Skin Integrity Impairment, Risk/Actual (Adult)  Goal: Identify Related Risk Factors and Signs and Symptoms  Outcome: Ongoing (interventions implemented as appropriate)    03/09/17 1853   Skin Integrity Impairment, Risk/Actual   Skin Integrity Impairment, Risk/Actual: Related Risk Factors age extremes;cognitive impairment;immobility;moisture       Goal: Skin Integrity/Wound Healing  Outcome: Ongoing (interventions implemented as appropriate)    Problem: Pressure Ulcer Risk (Mak Scale) (Adult,Obstetrics,Pediatric)  Goal: Identify Related Risk Factors and Signs and Symptoms  Outcome: Ongoing (interventions implemented as appropriate)    03/09/17 1853   Pressure Ulcer Risk (Mak Scale)   Related Risk Factors (Pressure Ulcer Risk (Mak Scale)) age extremes;cognitive impairment;mobility impaired;mental impairment       Goal: Skin Integrity  Outcome: Ongoing (interventions implemented as appropriate)    03/09/17 1853   Pressure Ulcer Risk (Mak Scale) (Adult,Obstetrics,Pediatric)   Skin Integrity making progress toward outcome         Problem: Patient Care Overview (Adult)  Goal: Plan of Care Review  Outcome: Ongoing (interventions implemented as appropriate)    03/09/17 1853   Coping/Psychosocial Response Interventions   Plan Of Care Reviewed With patient   Patient Care Overview   Progress improving         Problem: Fall Risk (Adult)  Goal: Identify Related Risk Factors and Signs and Symptoms  Outcome: Ongoing (interventions implemented as appropriate)    03/09/17 1853   Fall Risk   Fall Risk: Related Risk Factors age-related changes;bladder function altered;confusion/agitation;gait/mobility problems   Fall Risk: Signs and Symptoms presence of risk factors       Goal: Absence of Falls  Outcome: Ongoing (interventions implemented as appropriate)    Problem: Urine Elimination, Impaired (Adult)  Goal: Identify Related Risk Factors and Signs and Symptoms  Outcome: Ongoing (interventions implemented as  appropriate)    03/09/17 1853   Urine Elimination, Impaired   Urine Elimination, Impaired: Related Risk Factors aging effects;infection/inflammation   Signs and Symptoms (Urine Elimination Impaired) painful urination;infection/fever       Goal: Effective Urinary Elimination  Outcome: Ongoing (interventions implemented as appropriate)    03/09/17 1853   Urine Elimination, Impaired (Adult)   Effective Urinary Elimination unable to achieve outcome       Goal: Effective Containment of Urine  Outcome: Ongoing (interventions implemented as appropriate)    03/09/17 1853   Urine Elimination, Impaired (Adult)   Effective Containment of Urine making progress toward outcome       Goal: Reduced Incontinence Episodes  Outcome: Ongoing (interventions implemented as appropriate)    03/09/17 1853   Urine Elimination, Impaired (Adult)   Reduced Incontinence Episodes making progress toward outcome

## 2017-03-10 LAB
ANION GAP SERPL CALCULATED.3IONS-SCNC: 4 MMOL/L (ref 3–11)
BASOPHILS # BLD AUTO: 0.05 10*3/MM3 (ref 0–0.2)
BASOPHILS NFR BLD AUTO: 0.6 % (ref 0–1)
BUN BLD-MCNC: 16 MG/DL (ref 9–23)
BUN/CREAT SERPL: 22.9 (ref 7–25)
CALCIUM SPEC-SCNC: 10 MG/DL (ref 8.7–10.4)
CHLORIDE SERPL-SCNC: 103 MMOL/L (ref 99–109)
CO2 SERPL-SCNC: 29 MMOL/L (ref 20–31)
CREAT BLD-MCNC: 0.7 MG/DL (ref 0.6–1.3)
DEPRECATED RDW RBC AUTO: 49.1 FL (ref 37–54)
EOSINOPHIL # BLD AUTO: 0.23 10*3/MM3 (ref 0.1–0.3)
EOSINOPHIL NFR BLD AUTO: 2.7 % (ref 0–3)
ERYTHROCYTE [DISTWIDTH] IN BLOOD BY AUTOMATED COUNT: 14.3 % (ref 11.3–14.5)
GFR SERPL CREATININE-BSD FRML MDRD: 81 ML/MIN/1.73
GLUCOSE BLD-MCNC: 117 MG/DL (ref 70–100)
HCT VFR BLD AUTO: 36 % (ref 34.5–44)
HGB BLD-MCNC: 11.2 G/DL (ref 11.5–15.5)
IMM GRANULOCYTES # BLD: 0.01 10*3/MM3 (ref 0–0.03)
IMM GRANULOCYTES NFR BLD: 0.1 % (ref 0–0.6)
LYMPHOCYTES # BLD AUTO: 2.05 10*3/MM3 (ref 0.6–4.8)
LYMPHOCYTES NFR BLD AUTO: 24 % (ref 24–44)
MCH RBC QN AUTO: 29.2 PG (ref 27–31)
MCHC RBC AUTO-ENTMCNC: 31.1 G/DL (ref 32–36)
MCV RBC AUTO: 93.8 FL (ref 80–99)
MONOCYTES # BLD AUTO: 0.84 10*3/MM3 (ref 0–1)
MONOCYTES NFR BLD AUTO: 9.8 % (ref 0–12)
NEUTROPHILS # BLD AUTO: 5.37 10*3/MM3 (ref 1.5–8.3)
NEUTROPHILS NFR BLD AUTO: 62.8 % (ref 41–71)
PLATELET # BLD AUTO: 221 10*3/MM3 (ref 150–450)
PMV BLD AUTO: 11.7 FL (ref 6–12)
POTASSIUM BLD-SCNC: 3.6 MMOL/L (ref 3.5–5.5)
RBC # BLD AUTO: 3.84 10*6/MM3 (ref 3.89–5.14)
SODIUM BLD-SCNC: 136 MMOL/L (ref 132–146)
WBC NRBC COR # BLD: 8.55 10*3/MM3 (ref 3.5–10.8)

## 2017-03-10 PROCEDURE — 87086 URINE CULTURE/COLONY COUNT: CPT | Performed by: NURSE PRACTITIONER

## 2017-03-10 PROCEDURE — 25010000002 HALOPERIDOL LACTATE PER 5 MG: Performed by: INTERNAL MEDICINE

## 2017-03-10 PROCEDURE — 25010000002 LORAZEPAM PER 2 MG: Performed by: INTERNAL MEDICINE

## 2017-03-10 PROCEDURE — 99225 PR SBSQ OBSERVATION CARE/DAY 25 MINUTES: CPT | Performed by: NURSE PRACTITIONER

## 2017-03-10 PROCEDURE — 85025 COMPLETE CBC W/AUTO DIFF WBC: CPT | Performed by: NURSE PRACTITIONER

## 2017-03-10 PROCEDURE — 92507 TX SP LANG VOICE COMM INDIV: CPT

## 2017-03-10 PROCEDURE — G0378 HOSPITAL OBSERVATION PER HR: HCPCS

## 2017-03-10 PROCEDURE — 97110 THERAPEUTIC EXERCISES: CPT

## 2017-03-10 PROCEDURE — 80048 BASIC METABOLIC PNL TOTAL CA: CPT | Performed by: NURSE PRACTITIONER

## 2017-03-10 PROCEDURE — 25010000003 CEFTRIAXONE PER 250 MG: Performed by: NURSE PRACTITIONER

## 2017-03-10 PROCEDURE — 97116 GAIT TRAINING THERAPY: CPT

## 2017-03-10 PROCEDURE — 96376 TX/PRO/DX INJ SAME DRUG ADON: CPT

## 2017-03-10 RX ORDER — BETHANECHOL CHLORIDE 10 MG/1
10 TABLET ORAL 3 TIMES DAILY
Status: DISCONTINUED | OUTPATIENT
Start: 2017-03-10 | End: 2017-03-14 | Stop reason: HOSPADM

## 2017-03-10 RX ADMIN — Medication 400 MG: at 17:20

## 2017-03-10 RX ADMIN — FLUOXETINE HYDROCHLORIDE 20 MG: 20 CAPSULE ORAL at 08:55

## 2017-03-10 RX ADMIN — LORAZEPAM 0.5 MG: 2 INJECTION INTRAMUSCULAR; INTRAVENOUS at 10:08

## 2017-03-10 RX ADMIN — BETHANECHOL CHLORIDE 10 MG: 10 TABLET ORAL at 20:02

## 2017-03-10 RX ADMIN — Medication 400 MG: at 08:54

## 2017-03-10 RX ADMIN — ATENOLOL 50 MG: 50 TABLET ORAL at 08:54

## 2017-03-10 RX ADMIN — ATORVASTATIN CALCIUM 80 MG: 40 TABLET, FILM COATED ORAL at 20:02

## 2017-03-10 RX ADMIN — RIVASTIGMINE TRANSDERMAL SYSTEM 1 PATCH: 4.6 PATCH, EXTENDED RELEASE TRANSDERMAL at 08:55

## 2017-03-10 RX ADMIN — METHYLPHENIDATE HYDROCHLORIDE 5 MG: 5 TABLET ORAL at 08:54

## 2017-03-10 RX ADMIN — CLOPIDOGREL BISULFATE 75 MG: 75 TABLET, FILM COATED ORAL at 08:54

## 2017-03-10 RX ADMIN — HALOPERIDOL LACTATE 2 MG: 5 INJECTION, SOLUTION INTRAMUSCULAR at 19:51

## 2017-03-10 RX ADMIN — DOXAZOSIN MESYLATE 2 MG: 1 TABLET ORAL at 20:02

## 2017-03-10 RX ADMIN — ASPIRIN 325 MG ORAL TABLET 325 MG: 325 PILL ORAL at 08:54

## 2017-03-10 RX ADMIN — LORAZEPAM 0.5 MG: 2 INJECTION INTRAMUSCULAR; INTRAVENOUS at 18:38

## 2017-03-10 RX ADMIN — PANTOPRAZOLE SODIUM 40 MG: 40 TABLET, DELAYED RELEASE ORAL at 08:54

## 2017-03-10 RX ADMIN — CEFTRIAXONE SODIUM 1 G: 1 INJECTION, SOLUTION INTRAVENOUS at 08:50

## 2017-03-10 RX ADMIN — BETHANECHOL CHLORIDE 10 MG: 10 TABLET ORAL at 10:32

## 2017-03-10 RX ADMIN — DILTIAZEM HYDROCHLORIDE 240 MG: 240 CAPSULE, COATED, EXTENDED RELEASE ORAL at 20:02

## 2017-03-10 NOTE — PLAN OF CARE
Problem: Inpatient Physical Therapy  Goal: Bed Mobility Goal LTG- PT  Outcome: Ongoing (interventions implemented as appropriate)    03/07/17 1008 03/10/17 1025   Bed Mobility PT LTG   Bed Mobility PT LTG, Time to Achieve 2 wks --    Bed Mobility PT LTG, Activity Type all bed mobility --    Bed Mobility PT LTG, [place] Level minimum assist (75% patient effort) --    Bed Mobility PT LTG, Outcome --  goal ongoing       Goal: Transfer Training Goal 1 LTG- PT  Outcome: Ongoing (interventions implemented as appropriate)    03/07/17 1008 03/10/17 1025   Transfer Training PT LTG   Transfer Training PT LTG, Time to Achieve 2 wks --    Transfer Training PT LTG, Activity Type all transfers --    Transfer Training PT LTG, [place] Level minimum assist (75% patient effort) --    Transfer Training PT LTG, Assist Device walker, rolling --    Transfer Training PT LTG, Outcome --  goal ongoing       Goal: Gait Training Goal LTG- PT  Outcome: Ongoing (interventions implemented as appropriate)    03/07/17 1008 03/10/17 1025   Gait Training PT LTG   Gait Training Goal PT LTG, Time to Achieve 2 wks --    Gait Training Goal PT LTG, [place] Level moderate assist (50% patient effort) --    Gait Training Goal PT LTG, Assist Device walker, rolling --    Gait Training Goal PT LTG, Distance to Achieve 150 --    Gait Training Goal PT LTG, Outcome --  goal ongoing         Problem: Patient Care Overview (Adult)  Goal: Plan of Care Review  Outcome: Ongoing (interventions implemented as appropriate)    03/10/17 1025   Coping/Psychosocial Response Interventions   Plan Of Care Reviewed With patient   Outcome Evaluation   Outcome Summary/Follow up Plan pt somewhat confused,was able to take steps with lots of cues,worked on sitting   Patient Care Overview   Progress progress toward functional goals is gradual

## 2017-03-10 NOTE — PROGRESS NOTES
Continued Stay Note  [facility]     Patient Name: Loni Walker  MRN: [id number]  Today's Date: 3/10/2017    Admit Date: 3/6/2017          Discharge Plan       03/10/17 1440    Case Management/Social Work Plan    Plan [facility]    Patient/Family In Agreement With Plan yes    Additional Comments Spoke with Kat at [facility] they are willing to accept Mrs. Walker into a long term bed, Medicaid pending.  They cannot accept patient until Tues. 3/14.  Plan is to transfer patient to long term bed at [facility] tuesday, 3/14 [facility].   will continue to follow.                Discharge Codes     None        Expected Discharge Date and Time     Expected Discharge Date Expected Discharge Time    Mar 13, 2017             Ivana Mcdonald RN

## 2017-03-10 NOTE — PLAN OF CARE
Problem: Patient Care Overview (Adult)  Goal: Plan of Care Review  Outcome: Ongoing (interventions implemented as appropriate)    03/10/17 1029   Coping/Psychosocial Response Interventions   Plan Of Care Reviewed With patient   Outcome Evaluation   Outcome Summary/Follow up Plan Communication therapy complete. Pt agitated at start of session which increased as session continued. Decreased participation 2' agitation. RN made aware. REC: continued communication.    Patient Care Overview   Progress progress toward functional goals as expected         Problem: Inpatient SLP  Goal: Expressive-Patient will improve expressive language skills to allow optimal participation in care    03/07/17 1344 03/08/17 0939   Expressive- Optimal Participation in Care   Expressive Optimal Participation in Care- SLP, Date Established 03/07/17 --    Expressive Optimal Participation in Care- SLP, Time to Achieve by discharge --    Expressive Optimal Participation in Care- SLP, Activity Level Patient will improve word retrieval skills;Patient will improve ability to construct phrase and sentence level responses;Patient will improve connected speech to express thoughts --    Expressive Optimal Participation in Care- SLP, Outcome --  goal ongoing       Goal: Receptive Language-Patient will improve receptive language skills to allow optimal participation in care  Outcome: Ongoing (interventions implemented as appropriate)    03/07/17 1344 03/08/17 0939   Receptive Language- Optimal Participation in Care   Receptive Language Optimal Participation in Care- SLP, Date Established 03/07/17 --    Receptive Language Optimal Participation in Care- SLP, Time to Achieve by discharge --    Receptive Language Optimal Participation in Care- SLP, Activity Level Patient will improve ability to comprehend words/phrases/sentences;Patient will improve ability to follow directions;Patient will improve ability to comprehend questions --    Receptive Language  Optimal Participation in Care- SLP, Outcome --  goal ongoing

## 2017-03-10 NOTE — PROGRESS NOTES
Acute Care - Speech Language Pathology Treatment Note  [facility]     Patient Name: Loni Walker  : 1940  MRN: [id number]  Today's Date: 3/10/2017         Admit Date: 3/6/2017    Visit Dx:      ICD-10-CM ICD-9-CM   1. Altered mental status, unspecified R41.82 780.97   2. Weakness R53.1 780.79   3. FTT (failure to thrive) in adult R62.7 783.7   4. Impaired mobility and ADLs Z74.09 799.89   5. Impaired functional mobility, balance, gait, and endurance Z74.09 V49.89     Patient Active Problem List   Diagnosis   • Hypertension   • CVA (cerebral vascular accident)   • Carotid stenosis   • Constipation   • Cerebrovascular accident (CVA)   • Altered mental status, unspecified   • Hypertensive emergency   • Urinary retention   • UTI (urinary tract infection)              Adult Rehabilitation Note       03/10/17 1000 03/10/17 0940 17 1330    Rehab Assessment/Intervention    Discipline speech language pathologist  -LS physical therapy assistant  -UD physical therapy assistant  -UD    Document Type therapy note (daily note)  -LS therapy note (daily note)  -UD therapy note (daily note)  -UD    Subjective Information agree to therapy  -LS agree to therapy;complains of;pain  -UD agree to therapy;complains of;weakness  -UD    Patient Effort, Rehab Treatment fair  -LS good  -UD adequate  -UD    Precautions/Limitations  fall precautions  -UD fall precautions  -UD    Recorded by [LS] Aida Coleman, MS CCC-SLP [UD] Sofía Cade PTA [UD] Sofía Cade PTA    Vital Signs    O2 Delivery Post Treatment  supplemental O2  -UD room air  -UD    Pre Patient Position  Supine  -UD Supine  -UD    Intra Patient Position  Standing  -UD Standing  -UD    Post Patient Position  Sitting  -UD Sitting  -UD    Recorded by  [UD] Sofía Cade PTA [UD] Sofía Cade PTA    Pain Assessment    Pain Assessment  Pierce-Vieyra FACES  -UD Pierce-Baker FACES  -UD    Pierce-Vieyra FACES Pain Rating  2  -UD 0  -UD    Pain Type  Chronic pain  -UD     Pain  Location  Back  -UD     Pain Intervention(s)  Repositioned  -UD     Recorded by  [UD] Sofía Cade PTA [UD] Sofía Cade PTA    Cognitive Assessment/Intervention    Orientation Status  oriented to;person;situation  -UD oriented to;person;situation  -UD    Follows Commands/Answers Questions  75% of the time  -UD 50% of the time  -UD    Personal Safety Interventions   fall prevention program maintained  -UD    Recorded by  [UD] Sofía Cade PTA [UD] Sofía Cade PTA    Improve ability to comprehend words/phrases/sentences    Improve ability to comprehend words/phrases/sentences through: match objects, field of;match pictures, field of;identify pictures, field of;identify objects, field of;80%  -LS      Status: Improve ability to comprehend words/phrases/sentences Progressing as expected  -LS      Ability to Comprehend Words/Phrases/Sentences Progress 60%;with inconsistent cues  -LS      Recorded by [LS] Aida Coleman MS CCC-SLP      Improve ability to follow directions    Improve ability to follow directions: one-step directions with objects;one-step directions without objects;demonstrate function of object;80%  -LS      Status: Improve ability to follow directions Progress slower than expected  -LS      Ability to Follow Directions Progress 50%;with consistent cues  -LS      Comments: Improve ability to follow directions increased confusion/agitation likely impacting preformance  -LS      Recorded by [LS] Aida Coleman MS CCC-SLP      Improve ability to comprehend questions    Improve ability to comprehend questions: complex yes/no questions;simple general questions;80%  -LS      Status: Improve ability to comprehend questions Progress slower than expected  -LS      Ability to Comprehend Questions Progress 30%;with consistent cues;following model  -LS      Recorded by [LS] Aida Coleman MS CCC-SLP      Improve word retrieval skills    Improve word retrieval skills by: naming an object/pic;completing open  ended structured sentence;completing open ended unstructured sentence;answer WH question with one word;80%  -LS      Status: Improve word retrieval skills Progress slower than expected  -LS      Word Retrieval Skills Progress 60%;with inconsistent cues  -LS      Recorded by [LS] Aida Coleman MS CCC-SLP      Improve ability to construct phrase and sentence level responses    Improve ability to construct phrase and sentence level responses by: answering question with phrase;constructing a sentence with a key word;80%  -LS      Status: Improve ability to construct phrase and sentence level responses Progress slower than expected  -LS      Constructing Phrase and Sentence Level Responses Progress 50%;with consistent cues  -LS      Recorded by [LS] Aida Coleman MS CCC-SLP      Bed Mobility, Assessment/Treatment    Bed Mob, Supine to Sit, [place]  moderate assist (50% patient effort);2 person assist required  -UD moderate assist (50% patient effort);maximum assist (25% patient effort);2 person assist required  -UD    Recorded by  [UD] Sofía Cade PTA [UD] Sofía Cade PTA    Transfer Assessment/Treatment    Transfers, Bed-Chair [place]   moderate assist (50% patient effort);maximum assist (25% patient effort);2 person assist required  -UD    Transfers, Sit-Stand [place]  moderate assist (50% patient effort);2 person assist required  -UD moderate assist (50% patient effort);maximum assist (25% patient effort);2 person assist required  -UD    Transfers, Stand-Sit [place]  moderate assist (50% patient effort);2 person assist required  -UD moderate assist (50% patient effort);maximum assist (25% patient effort);2 person assist required  -UD    Recorded by  [UD] Sofía Cade PTA [UD] Sofía Cade PTA    Gait Assessment/Treatment    Gait, [place] Level  moderate assist (50% patient effort);2 person assist required  -UD moderate assist (50% patient effort);2 person assist required  -UD     Gait, Distance (Feet)  10  -UD 5   to chair only  -UD    Gait, Gait Deviations  narrow base;decreased heel strike  -UD narrow base;step length decreased   leans to rt  -UD    Gait, Safety Issues  loses balance backward;supplemental O2;step length decreased  -UD step length decreased  -UD    Gait, Impairments  strength decreased;impaired balance  -UD strength decreased;impaired balance  -UD    Gait, Comment  --   did better with HH vs walker  -UD     Recorded by  [UD] Sofía Cade PTA [UD] Sofía Cade PTA    Balance Skills Training    Sitting-Level of Assistance  Minimum assistance;x2  -UD Minimum assistance;x2  -UD    Sitting-Balance Activities  Forward lean  -UD Forward lean;Lateral lean  -UD    Recorded by  [UD] Sofía Cade PTA [UD] Sofía Cade PTA    Therapy Exercises    Bilateral Lower Extremities  PROM:;AAROM:;10 reps;sitting  -UD PROM:;AAROM:;10 reps;sitting  -UD    Bilateral Upper Extremity  AAROM:;10 reps;sitting  -UD AAROM:;10 reps;sitting  -UD    Recorded by  [UD] Sofía Cade PTA [UD] Sofía Cade PTA    Positioning and Restraints    Pre-Treatment Position  in bed  -UD in bed  -UD    Post Treatment Position  chair  -UD chair  -UD    In Chair  notified nsg;reclined;sitting;call light within reach;exit alarm on;legs elevated  -UD notified nsg;reclined;sitting;call light within reach;exit alarm on;legs elevated  -UD    Recorded by  [UD] Sofía Cade PTA [UD] Sofía Cade PTA      03/08/17 1400 03/08/17 0900       Rehab Assessment/Intervention    Discipline occupational therapist  - speech language pathologist  -AV     Document Type therapy note (daily note)  - therapy note (daily note)  -AV     Subjective Information agree to therapy   Pt initially agreed to tx, then during tx refused  - no complaints  -AV     Patient Effort, Rehab Treatment poor  -      Precautions/Limitations fall precautions   Exit alarm  -      Recorded by [MC] Devika Tejeda OT [AV] Emilia Coleman, MS CCC-SLP      Pain Assessment    Pain Assessment No/denies pain  - No/denies pain  -AV     Recorded by [MC] Devika Tejeda, OT [AV] Emilia Coleman MS CCC-SLP     Cognitive Assessment/Intervention    Current Cognitive/Communication Assessment impaired  -      Orientation Status oriented to;person;place;time;other (see comments)   With choices  -      Follows Commands/Answers Questions 25% of the time  -      Recorded by [MC] Devika Tejeda, OT      Improve ability to comprehend words/phrases/sentences    Improve ability to comprehend words/phrases/sentences through:  identify objects, field of;80%  -AV     Ability to Comprehend Words/Phrases/Sentences Progress  50%;with consistent cues  -AV     Recorded by  [AV] Emilia Coleman MS CCC-SLP     Improve ability to follow directions    Improve ability to follow directions:  one-step directions with objects;one-step directions without objects;demonstrate function of object;80%  -AV     Ability to Follow Directions Progress  60%;with inconsistent cues  -AV     Recorded by  [AV] Emilia Coleman MS CCC-SLP     Improve ability to comprehend questions    Improve ability to comprehend questions:  complex yes/no questions;80%  -AV     Ability to Comprehend Questions Progress  40%;with inconsistent cues  -AV     Recorded by  [AV] Emilia Coleman MS CCC-SLP     Improve word retrieval skills    Improve word retrieval skills by:  naming an object/pic;80%  -AV     Word Retrieval Skills Progress  70%;with inconsistent cues  -AV     Recorded by  [AV] Emilia Coleman MS CCC-SLP     Improve ability to construct phrase and sentence level responses    Improve ability to construct phrase and sentence level responses by:  answering question with phrase;80%  -AV     Constructing Phrase and Sentence Level Responses Progress  50%;with consistent cues  -AV     Recorded by  [AV] Emilia Coleman MS CCC-SLP     Bed Mobility, Assessment/Treatment    Bed Mobility, Comment Refused  -       Recorded by [MC] Devika Tejeda OT      Therapy Exercises    Bilateral Lower Extremities AAROM:;10 reps  -      Bilateral Upper Extremity --   Refused  -      Recorded by [MC] Devika Tejeda OT      Positioning and Restraints    Pre-Treatment Position in bed  -      Post Treatment Position bed  -      In Bed notified nsg;supine;call light within reach;encouraged to call for assist;exit alarm on;side rails up x3  -      Recorded by [MC] Devika Tejeda OT        User Key  (r) = Recorded By, (t) = Taken By, (c) = Cosigned By    Initials Name Effective Dates    UD Sofía Cade, PTA 06/22/15 -     LS Aida Coleman, MS [facility]-SLP 06/22/15 -     MC Devika Tejeda OT 03/14/16 -     AV Emilia Coleman, MS CCC-SLP 03/14/16 -               IP SLP Goals       03/08/17 0939 03/07/17 1344       Receptive Language- Optimal Participation in Care    Receptive Language Optimal Participation in Care- SLP, Date Established  03/07/17  -AV     Receptive Language Optimal Participation in Care- SLP, Time to Achieve  by discharge  -AV     Receptive Language Optimal Participation in Care- SLP, Activity Level  Patient will improve ability to comprehend words/phrases/sentences;Patient will improve ability to follow directions;Patient will improve ability to comprehend questions  -AV     Receptive Language Optimal Participation in Care- SLP, Outcome goal ongoing  -AV      Expressive- Optimal Participation in Care    Expressive Optimal Participation in Care- SLP, Date Established  03/07/17  -AV     Expressive Optimal Participation in Care- SLP, Time to Achieve  by discharge  -AV     Expressive Optimal Participation in Care- SLP, Activity Level  Patient will improve word retrieval skills;Patient will improve ability to construct phrase and sentence level responses;Patient will improve connected speech to express thoughts  -AV     Expressive Optimal Participation in Care- SLP, Outcome goal ongoing  -AV        User Key  (r) = Recorded By, (t) =  Taken By, (c) = Cosigned By    Initials Name Provider Type    EVA Coleman, MS CCC-SLP Speech and Language Pathologist          EDUCATION  The patient has been educated in the following areas:   Communication Impairment.    SLP Recommendation and Plan                               Plan of Care Review  Plan Of Care Reviewed With: patient  Progress: progress toward functional goals as expected  Outcome Summary/Follow up Plan: Communication therapy complete. Pt agitated at start of session which increased as session continued. Decreased particpation 2' agitation. RN made aware. REC: continued communication.           Time Calculation:         Time Calculation- SLP       03/10/17 1031          Time Calculation- SLP    SLP Start Time 1000  -LS      SLP Received On 03/10/17  -        User Key  (r) = Recorded By, (t) = Taken By, (c) = Cosigned By    Initials Name Provider Type     Aida Coleman MS CCC-SLP Speech and Language Pathologist          Therapy Charges for Today     Code Description Service Date Service Provider Modifiers Qty    [id number]  ST TREATMENT SPEECH 3 3/10/2017 Aida Coleman MS CCC-SLP GN 1          SLP G-Codes  Functional Limitations: Spoken language expressive  Spoken Language Expression Current Status (): At least 40 percent but less than 60 percent impaired, limited or restricted  Spoken Language Expression Goal Status (): 0 percent impaired, limited or restricted    Aida Coleman MS CCC-SLP  3/10/2017

## 2017-03-10 NOTE — PROGRESS NOTES
[facility] Medicine Services  INPATIENT PROGRESS NOTE    Date of Admission: 3/6/2017  Length of Stay: 0  Primary Care Physician: No Known Provider    Subjective   CC: hospital follow up    HPI:  No issues overnight per nursing.  Still with retention issues, but voiding small amounts.  No complaints.  Culture has been ordered, but is not running.  I have contacted micro and will start today.      Review Of Systems:   Review of Systems   Unable to perform ROS: Dementia         Objective      Temp:  [98 °F (36.7 °C)-99 °F (37.2 °C)] 98 °F (36.7 °C)  Heart Rate:  [65-75] 68  Resp:  [16-20] 16  BP: (110-146)/(41-66) 110/47  Physical Exam   Constitutional: She appears well-developed and well-nourished. No distress.   Sitting up in chair.     HENT:   Head: Normocephalic.   Eyes: No scleral icterus.   Neck: Neck supple.   Cardiovascular: Normal rate and regular rhythm.  Exam reveals no gallop and no friction rub.    No murmur heard.  Pulmonary/Chest: Effort normal. No respiratory distress. She has no wheezes.   Abdominal: Soft. Bowel sounds are normal. She exhibits no distension.   Neurological: She is alert.   Watching TV  pleasantly confused   Skin: Skin is warm and dry.   Psychiatric: She has a normal mood and affect.   Vitals reviewed.        Results Review:    I have reviewed the labs, radiology results and diagnostic studies.      Results from last 7 days  Lab Units 03/10/17  0710   WBC 10*3/mm3 8.55   HEMOGLOBIN g/dL 11.2*   PLATELETS 10*3/mm3 221       Results from last 7 days  Lab Units 03/10/17  0710   SODIUM mmol/L 136   POTASSIUM mmol/L 3.6   CHLORIDE mmol/L 103   TOTAL CO2 mmol/L 29.0   BUN mg/dL 16   CREATININE mg/dL 0.70   GLUCOSE mg/dL 117*   CALCIUM mg/dL 10.0       Culture Data: Cultures:    BLOOD CULTURE   Date Value Ref Range Status   03/05/2017 No growth at 3 days  Preliminary   03/05/2017 No growth at 3 days  Preliminary       Radiology Data: no new    I have reviewed the  medications.    Assessment/Plan     Problem List  Principal Problem:    Altered mental status, unspecified  Active Problems:    Hypertension    Carotid stenosis    Cerebrovascular accident (CVA)    Hypertensive emergency    Urinary retention    UTI (urinary tract infection)           Assessment/Plan:  --Day #2 of rocephin, dc after 3rd dose unless culture abnormal  --urology following  --follow urine culture  --follow up with neuro after dc  --BP remains stable  --transfer Tuesday    DVT prophylaxis: TEDS/SCDS  Discharge Planning: I expect patient to be discharged to NH on Tuesday for long-term care     Zenia Villafana, APRN   03/10/17   2:37 PM    Please note that portions of this note may have been completed with a voice recognition program. Efforts were made to edit the dictations, but occasionally words are mistranscribed.

## 2017-03-10 NOTE — PROGRESS NOTES
Acute Care - Physical Therapy Treatment Note  [facility]     Patient Name: Loni Walker  : 1940  MRN: [id number]  Today's Date: 3/10/2017  Onset of Illness/Injury or Date of Surgery Date: 17  Date of Referral to PT: 17  Referring Physician: GABRIELLE GASPAR    Admit Date: 3/6/2017    Visit Dx:    ICD-10-CM ICD-9-CM   1. Altered mental status, unspecified R41.82 780.97   2. Weakness R53.1 780.79   3. FTT (failure to thrive) in adult R62.7 783.7   4. Impaired mobility and ADLs Z74.09 799.89   5. Impaired functional mobility, balance, gait, and endurance Z74.09 V49.89     Patient Active Problem List   Diagnosis   • Hypertension   • CVA (cerebral vascular accident)   • Carotid stenosis   • Constipation   • Cerebrovascular accident (CVA)   • Altered mental status, unspecified   • Hypertensive emergency   • Urinary retention   • UTI (urinary tract infection)               Adult Rehabilitation Note       03/10/17 0940 17 1330 17 1400    Rehab Assessment/Intervention    Discipline physical therapy assistant  -UD physical therapy assistant  -UD occupational therapist  -MC    Document Type therapy note (daily note)  -UD therapy note (daily note)  -UD therapy note (daily note)  -MC    Subjective Information agree to therapy;complains of;pain  -UD agree to therapy;complains of;weakness  -UD agree to therapy   Pt initially agreed to tx, then during tx refused  -MC    Patient Effort, Rehab Treatment good  -UD adequate  -UD poor  -MC    Precautions/Limitations fall precautions  -UD fall precautions  -UD fall precautions   Exit alarm  -MC    Recorded by [UD] Sofía Cade PTA [UD] Sofía Cade PTA [MC] Devika Tejeda OT    Vital Signs    O2 Delivery Post Treatment supplemental O2  -UD room air  -UD     Pre Patient Position Supine  -UD Supine  -UD     Intra Patient Position Standing  -UD Standing  -UD     Post Patient Position Sitting  -UD Sitting  -UD     Recorded by [UD] Sofía Cade PTA [UD] Sofía  DONTA Cade     Pain Assessment    Pain Assessment Pierce-Vieyra FACES  -UD Pierce-Baker FACES  -UD No/denies pain  -    Pierce-Vieyra FACES Pain Rating 2  -UD 0  -UD     Pain Type Chronic pain  -UD      Pain Location Back  -UD      Pain Intervention(s) Repositioned  -UD      Recorded by [UD] Sofía Cade PTA [UD] Sofía Cade PTA [] Devika Tejeda, OT    Cognitive Assessment/Intervention    Current Cognitive/Communication Assessment   impaired  -    Orientation Status oriented to;person;situation  -UD oriented to;person;situation  -UD oriented to;person;place;time;other (see comments)   With choices  -    Follows Commands/Answers Questions 75% of the time  -UD 50% of the time  -UD 25% of the time  -    Personal Safety Interventions  fall prevention program maintained  -UD     Recorded by [UD] Sofía Cade PTA [UD] Sofía Cade PTA [] Deivka Tejeda, OT    Bed Mobility, Assessment/Treatment    Bed Mob, Supine to Sit, Butts moderate assist (50% patient effort);2 person assist required  -UD moderate assist (50% patient effort);maximum assist (25% patient effort);2 person assist required  -UD     Bed Mobility, Comment   Refused  -MC    Recorded by [UD] Sofía Cade PTA [UD] Sofía Cade PTA [] Devika Tejeda, OT    Transfer Assessment/Treatment    Transfers, Bed-Chair Butts  moderate assist (50% patient effort);maximum assist (25% patient effort);2 person assist required  -UD     Transfers, Sit-Stand Butts moderate assist (50% patient effort);2 person assist required  -UD moderate assist (50% patient effort);maximum assist (25% patient effort);2 person assist required  -UD     Transfers, Stand-Sit Butts moderate assist (50% patient effort);2 person assist required  -UD moderate assist (50% patient effort);maximum assist (25% patient effort);2 person assist required  -UD     Recorded by [UD] Sofía Cade PTA [UD] Sofía Cade PTA     Gait Assessment/Treatment    Gait, Butts Level  moderate assist (50% patient effort);2 person assist required  -UD moderate assist (50% patient effort);2 person assist required  -UD     Gait, Distance (Feet) 10  -UD 5   to chair only  -UD     Gait, Gait Deviations narrow base;decreased heel strike  -UD narrow base;step length decreased   leans to rt  -UD     Gait, Safety Issues loses balance backward;supplemental O2;step length decreased  -UD step length decreased  -UD     Gait, Impairments strength decreased;impaired balance  -UD strength decreased;impaired balance  -UD     Gait, Comment --   did better with HH vs walker  -UD      Recorded by [UD] Sofía Cade PTA [UD] Sofía Cade PTA     Balance Skills Training    Sitting-Level of Assistance Minimum assistance;x2  -UD Minimum assistance;x2  -UD     Sitting-Balance Activities Forward lean  -UD Forward lean;Lateral lean  -UD     Recorded by [UD] Sofía Cade PTA [UD] Sofía Cade PTA     Therapy Exercises    Bilateral Lower Extremities PROM:;AAROM:;10 reps;sitting  -UD PROM:;AAROM:;10 reps;sitting  -UD AAROM:;10 reps  -    Bilateral Upper Extremity AAROM:;10 reps;sitting  -UD AAROM:;10 reps;sitting  -UD --   Refused  -MC    Recorded by [UD] Sofía Cade PTA [UD] Sofía Cade PTA [] Devika Tejeda OT    Positioning and Restraints    Pre-Treatment Position in bed  -UD in bed  -UD in bed  -MC    Post Treatment Position chair  -UD chair  -UD bed  -MC    In Bed   notified nsg;supine;call light within reach;encouraged to call for assist;exit alarm on;side rails up x3  -MC    In Chair notified nsg;reclined;sitting;call light within reach;exit alarm on;legs elevated  -UD notified nsg;reclined;sitting;call light within reach;exit alarm on;legs elevated  -UD     Recorded by [UD] Sofía Cade PTA [] Sofía Cade PTA [] Devika Tejeda OT      03/08/17 0900          Rehab Assessment/Intervention    Discipline speech language pathologist  -AV      Document Type therapy note (daily note)  -AV      Subjective  Information no complaints  -AV      Recorded by [AV] Emilia Coleman MS CCC-SLP      Pain Assessment    Pain Assessment No/denies pain  -AV      Recorded by [AV] Emilia Coleman MS CCC-SLP      Improve ability to comprehend words/phrases/sentences    Improve ability to comprehend words/phrases/sentences through: identify objects, field of;80%  -AV      Ability to Comprehend Words/Phrases/Sentences Progress 50%;with consistent cues  -AV      Recorded by [AV] Emilia Coleman MS CCC-SLP      Improve ability to follow directions    Improve ability to follow directions: one-step directions with objects;one-step directions without objects;demonstrate function of object;80%  -AV      Ability to Follow Directions Progress 60%;with inconsistent cues  -AV      Recorded by [AV] Emilia Coleman MS CCC-SLP      Improve ability to comprehend questions    Improve ability to comprehend questions: complex yes/no questions;80%  -AV      Ability to Comprehend Questions Progress 40%;with inconsistent cues  -AV      Recorded by [AV] Emilia Coleman MS CCC-SLP      Improve word retrieval skills    Improve word retrieval skills by: naming an object/pic;80%  -AV      Word Retrieval Skills Progress 70%;with inconsistent cues  -AV      Recorded by [AV] Emilia Coleman MS CCC-SLP      Improve ability to construct phrase and sentence level responses    Improve ability to construct phrase and sentence level responses by: answering question with phrase;80%  -AV      Constructing Phrase and Sentence Level Responses Progress 50%;with consistent cues  -AV      Recorded by [AV] MS KATHRYN GaticaSLP        User Key  (r) = Recorded By, (t) = Taken By, (c) = Cosigned By    Initials Name Effective Dates    FILI Cade, PTA 06/22/15 -      Devika Tejeda, OT 03/14/16 -     AV Emilia Coleman MS CCC-SLP 03/14/16 -                 IP PT Goals       03/10/17 1025 03/09/17 1412 03/07/17 1008    Bed Mobility PT LTG    Bed  Mobility PT LTG, Time to Achieve   2 wks  -CD    Bed Mobility PT LTG, Activity Type   all bed mobility  -CD    Bed Mobility PT LTG, [facility] Level   minimum assist (75% patient effort)  -CD    Bed Mobility PT LTG, Outcome goal ongoing  -UD goal ongoing  -UD     Transfer Training PT LTG    Transfer Training PT LTG, Time to Achieve   2 wks  -CD    Transfer Training PT LTG, Activity Type   all transfers  -CD    Transfer Training PT LTG, [facility] Level   minimum assist (75% patient effort)  -CD    Transfer Training PT LTG, Assist Device   walker, rolling  -CD    Transfer Training PT LTG, Outcome goal ongoing  -UD goal ongoing  -UD     Gait Training PT LTG    Gait Training Goal PT LTG, Time to Achieve   2 wks  -CD    Gait Training Goal PT LTG, [facility] Level   moderate assist (50% patient effort)  -CD    Gait Training Goal PT LTG, Assist Device   walker, rolling  -CD    Gait Training Goal PT LTG, Distance to Achieve   150  -CD    Gait Training Goal PT LTG, Outcome goal ongoing  -UD goal ongoing  -UD       User Key  (r) = Recorded By, (t) = Taken By, (c) = Cosigned By    Initials Name Provider Type    FILI Cade, PTA Physical Therapy Assistant    MELISSA Aleman, PT Physical Therapist          Physical Therapy Education     Title: PT OT SLP Therapies (Active)     Topic: Physical Therapy (Active)     Point: Mobility training (Active)    Learning Progress Summary    Learner Readiness Method Response Comment Documented by Status   Patient Acceptance E,D NR  UD 03/10/17 1025 Active    Acceptance E,TB NR   03/10/17 0611 Active    Acceptance D,E NR  UD 03/09/17 1411 Active    Acceptance E VU,NR BENEFITS OF OOB ACTIVITY, SAFETY WITH MOBILITY, POSITIONING, D/C PLANNING. CD 03/07/17 1007 Done               Point: Home exercise program (Active)    Learning Progress Summary    Learner Readiness Method Response Comment Documented by Status   Patient Acceptance E,D NR  UD 03/10/17 1025 Active    Acceptance E,TB NR    03/10/17 0611 Active    Acceptance D,E NR   03/09/17 1411 Active    Acceptance E VU,NR BENEFITS OF OOB ACTIVITY, SAFETY WITH MOBILITY, POSITIONING, D/C PLANNING.  03/07/17 1007 Done               Point: Body mechanics (Active)    Learning Progress Summary    Learner Readiness Method Response Comment Documented by Status   Patient Acceptance E,D NR   03/10/17 1025 Active    Acceptance E,TB NR   03/10/17 0611 Active    Acceptance D,E NR   03/09/17 1411 Active    Acceptance E VU,NR BENEFITS OF OOB ACTIVITY, SAFETY WITH MOBILITY, POSITIONING, D/C PLANNING.  03/07/17 1007 Done               Point: Precautions (Active)    Learning Progress Summary    Learner Readiness Method Response Comment Documented by Status   Patient Acceptance E,D NR   03/10/17 1025 Active    Acceptance E,TB NR   03/10/17 0611 Active    Acceptance D,E NR   03/09/17 1411 Active    Acceptance E VU,NR BENEFITS OF OOB ACTIVITY, SAFETY WITH MOBILITY, POSITIONING, D/C PLANNING.  03/07/17 1007 Done                      User Key     Initials Effective Dates Name Provider Type Discipline     06/22/15 -  Sofía Cade, PTA Physical Therapy Assistant PT     06/19/15 -  Sheri Aleman, PT Physical Therapist PT     06/16/16 -  Francesca Nova, RN Registered Nurse Nurse                    PT Recommendation and Plan  Anticipated Discharge Disposition: skilled nursing facility  PT Frequency: daily  Plan of Care Review  Plan Of Care Reviewed With: patient  Progress: progress toward functional goals is gradual  Outcome Summary/Follow up Plan: pt somewhat confused,was able to take steps with lots of cues,worked on sitting          Outcome Measures       03/10/17 0940 03/09/17 1330 03/08/17 1400    How much help from another person do you currently need...    Turning from your back to your side while in flat bed without using bedrails? 2  -UD 2  -UD     Moving from lying on back to sitting on the side of a flat bed without bedrails? 2  -UD  2  -UD     Moving to and from a bed to a chair (including a wheelchair)? 2  -UD 2  -UD     Standing up from a chair using your arms (e.g., wheelchair, bedside chair)? 2  -UD 2  -UD     Climbing 3-5 steps with a railing? 1  -UD 1  -UD     To walk in hospital room? 2  -UD 2  -UD     AM-PAC 6 Clicks Score 11  -UD 11  -UD     How much help from another is currently needed...    Putting on and taking off regular lower body clothing?   2  -MC    Bathing (including washing, rinsing, and drying)   2  -MC    Toileting (which includes using toilet bed pan or urinal)   2  -MC    Putting on and taking off regular upper body clothing   2  -MC    Taking care of personal grooming (such as brushing teeth)   2  -MC    Eating meals   2  -MC    Score   12  -MC    Functional Assessment    Outcome Measure Options   AM-PAC 6 Clicks Daily Activity (OT)  -      User Key  (r) = Recorded By, (t) = Taken By, (c) = Cosigned By    Initials Name Provider Type    UD Sofía Cade PTA Physical Therapy Assistant    JERZY Tejeda, OT Occupational Therapist           Time Calculation:         PT Charges       03/10/17 1027          Time Calculation    PT Received On 03/10/17  -      PT Goal Re-Cert Due Date 03/17/17  -UD      Time Calculation- PT    Total Timed Code Minutes- PT 24 minute(s)  -        User Key  (r) = Recorded By, (t) = Taken By, (c) = Cosigned By    Initials Name Provider Type    FILI Cade PTA Physical Therapy Assistant          Therapy Charges for Today     Code Description Service Date Service Provider Modifiers Qty    94387512192 HC PT THER PROC EA 15 MIN 3/9/2017 Sofía Cade PTA GP 1    06600823963 HC GAIT TRAINING EA 15 MIN 3/9/2017 Sofía Cade, PTA GP 1    02459655277 HC PT THER SUPP EA 15 MIN 3/9/2017 Sofía Cade, PTA GP 1    41651380917 HC PT THER PROC EA 15 MIN 3/10/2017 Sofía Cade, PTA GP 1    34629365698 HC GAIT TRAINING EA 15 MIN 3/10/2017 Sofía Cade, PTA GP 1    35908273417 HC PT THER SUPP EA 15 MIN  3/10/2017 Sofía Cade, PTA GP 1          PT G-Codes  Outcome Measure Options: AM-PAC 6 Clicks Daily Activity (OT)    Sofía Cade PTA  3/10/2017

## 2017-03-11 LAB
BACTERIA SPEC AEROBE CULT: NORMAL
BACTERIA SPEC AEROBE CULT: NORMAL

## 2017-03-11 PROCEDURE — 25010000002 HALOPERIDOL LACTATE PER 5 MG: Performed by: INTERNAL MEDICINE

## 2017-03-11 PROCEDURE — 25010000003 CEFTRIAXONE PER 250 MG: Performed by: NURSE PRACTITIONER

## 2017-03-11 PROCEDURE — 96376 TX/PRO/DX INJ SAME DRUG ADON: CPT

## 2017-03-11 PROCEDURE — G0378 HOSPITAL OBSERVATION PER HR: HCPCS

## 2017-03-11 PROCEDURE — 97530 THERAPEUTIC ACTIVITIES: CPT

## 2017-03-11 PROCEDURE — 99225 PR SBSQ OBSERVATION CARE/DAY 25 MINUTES: CPT | Performed by: INTERNAL MEDICINE

## 2017-03-11 RX ORDER — POLYETHYLENE GLYCOL 3350 17 G/17G
17 POWDER, FOR SOLUTION ORAL DAILY
Status: DISCONTINUED | OUTPATIENT
Start: 2017-03-11 | End: 2017-03-12

## 2017-03-11 RX ORDER — SACCHAROMYCES BOULARDII 250 MG
250 CAPSULE ORAL 2 TIMES DAILY
Status: DISCONTINUED | OUTPATIENT
Start: 2017-03-11 | End: 2017-03-14 | Stop reason: HOSPADM

## 2017-03-11 RX ORDER — SENNA AND DOCUSATE SODIUM 50; 8.6 MG/1; MG/1
2 TABLET, FILM COATED ORAL NIGHTLY
Status: DISCONTINUED | OUTPATIENT
Start: 2017-03-11 | End: 2017-03-12

## 2017-03-11 RX ADMIN — CEFTRIAXONE SODIUM 1 G: 1 INJECTION, SOLUTION INTRAVENOUS at 08:55

## 2017-03-11 RX ADMIN — DILTIAZEM HYDROCHLORIDE 240 MG: 240 CAPSULE, COATED, EXTENDED RELEASE ORAL at 20:41

## 2017-03-11 RX ADMIN — Medication 400 MG: at 08:56

## 2017-03-11 RX ADMIN — RIVASTIGMINE TRANSDERMAL SYSTEM 1 PATCH: 4.6 PATCH, EXTENDED RELEASE TRANSDERMAL at 08:57

## 2017-03-11 RX ADMIN — DOXAZOSIN MESYLATE 2 MG: 1 TABLET ORAL at 20:41

## 2017-03-11 RX ADMIN — HALOPERIDOL LACTATE 2 MG: 5 INJECTION, SOLUTION INTRAMUSCULAR at 20:40

## 2017-03-11 RX ADMIN — ASPIRIN 325 MG ORAL TABLET 325 MG: 325 PILL ORAL at 08:56

## 2017-03-11 RX ADMIN — ATORVASTATIN CALCIUM 80 MG: 40 TABLET, FILM COATED ORAL at 20:40

## 2017-03-11 RX ADMIN — BETHANECHOL CHLORIDE 10 MG: 10 TABLET ORAL at 08:56

## 2017-03-11 RX ADMIN — BETHANECHOL CHLORIDE 10 MG: 10 TABLET ORAL at 20:40

## 2017-03-11 RX ADMIN — CLOPIDOGREL BISULFATE 75 MG: 75 TABLET, FILM COATED ORAL at 08:56

## 2017-03-11 RX ADMIN — METHYLPHENIDATE HYDROCHLORIDE 5 MG: 5 TABLET ORAL at 08:56

## 2017-03-11 RX ADMIN — ATENOLOL 50 MG: 50 TABLET ORAL at 08:56

## 2017-03-11 RX ADMIN — PANTOPRAZOLE SODIUM 40 MG: 40 TABLET, DELAYED RELEASE ORAL at 08:56

## 2017-03-11 RX ADMIN — Medication 400 MG: at 17:15

## 2017-03-11 RX ADMIN — BETHANECHOL CHLORIDE 10 MG: 10 TABLET ORAL at 16:03

## 2017-03-11 NOTE — PROGRESS NOTES
Acute Care - Occupational Therapy Treatment Note  [facility]     Patient Name: Loni Walker  : 1940  MRN: [id number]  Today's Date: 3/11/2017  Onset of Illness/Injury or Date of Surgery Date: 17  Date of Referral to OT: 17  Referring Physician: GABRIELLE GASPAR      Admit Date: 3/6/2017    Visit Dx:     ICD-10-CM ICD-9-CM   1. Altered mental status, unspecified R41.82 780.97   2. Weakness R53.1 780.79   3. FTT (failure to thrive) in adult R62.7 783.7   4. Impaired mobility and ADLs Z74.09 799.89   5. Impaired functional mobility, balance, gait, and endurance Z74.09 V49.89     Patient Active Problem List   Diagnosis   • Hypertension   • CVA (cerebral vascular accident)   • Carotid stenosis   • Constipation   • Cerebrovascular accident (CVA)   • Altered mental status, unspecified   • Hypertensive emergency   • Urinary retention   • UTI (urinary tract infection)             Adult Rehabilitation Note       17 1120 03/10/17 1000 03/10/17 0940    Rehab Assessment/Intervention    Discipline occupational therapist  -TB speech language pathologist  -LS physical therapy assistant  -UD    Document Type therapy note (daily note)  -TB therapy note (daily note)  -LS therapy note (daily note)  -UD    Subjective Information agree to therapy   with encouragement  -TB agree to therapy  -LS agree to therapy;complains of;pain  -UD    Patient Effort, Rehab Treatment fair  -TB fair  -LS good  -UD    Precautions/Limitations fall precautions  -TB  fall precautions  -UD    Specific Treatment Considerations Confusion and fear of falling limit participation  -TB      Recorded by [TB] Raquel Fung, OT [LS] Aida Coleman, MS CCC-SLP [UD] Sofía Cade, PTA    Vital Signs    Pre Treatment Diastolic BP --   BP stable  -TB      O2 Delivery Post Treatment supplemental O2  -TB  supplemental O2  -UD    Pre Patient Position Supine  -TB  Supine  -UD    Intra Patient Position Sitting  -TB  Standing  -UD    Post Patient  Position Supine  -TB  Sitting  -UD    Recorded by [TB] Raquel Fung, OT  [UD] Sofía Cade, PTA    Pain Assessment    Pain Assessment Pierce-Vieyra FACES  -TB  Pierce-Vieyra FACES  -UD    Pierce-Vieyra FACES Pain Rating 2  -TB  2  -UD    Pain Score 2  -TB      Post Pain Score 2  -TB      Pain Type Chronic pain  -TB  Chronic pain  -UD    Pain Location Back  -TB  Back  -UD    Pain Orientation Right  -TB      Pain Intervention(s) Repositioned  -TB  Repositioned  -UD    Recorded by [TB] Raquel Fung, OT  [UD] Sofía Cade PTA    Cognitive Assessment/Intervention    Current Cognitive/Communication Assessment impaired  -TB      Orientation Status oriented to;person;place  -TB  oriented to;person;situation  -UD    Follows Commands/Answers Questions able to follow single-step instructions;needs cueing;needs repetition;75% of the time  -TB  75% of the time  -UD    Personal Safety moderate impairment;decreased insight to deficits  -TB      Personal Safety Interventions fall prevention program maintained;gait belt;nonskid shoes/slippers when out of bed   Exit alarms  -TB      Recorded by [TB] Raquel Fung, OT  [UD] Sofía Cade, PTA    Improve ability to comprehend words/phrases/sentences    Improve ability to comprehend words/phrases/sentences through:  match objects, field of;match pictures, field of;identify pictures, field of;identify objects, field of;80%  -LS     Status: Improve ability to comprehend words/phrases/sentences  Progressing as expected  -LS     Ability to Comprehend Words/Phrases/Sentences Progress  60%;with inconsistent cues  -LS     Recorded by  [LS] Aida Coleman MS CCC-SLP     Improve ability to follow directions    Improve ability to follow directions:  one-step directions with objects;one-step directions without objects;demonstrate function of object;80%  -LS     Status: Improve ability to follow directions  Progress slower than expected  -LS     Ability to Follow Directions  Progress  50%;with consistent cues  -LS     Comments: Improve ability to follow directions  increased confusion/agitation likely impacting preformance  -LS     Recorded by  [LS] Aida Coleman MS CCC-SLP     Improve ability to comprehend questions    Improve ability to comprehend questions:  complex yes/no questions;simple general questions;80%  -LS     Status: Improve ability to comprehend questions  Progress slower than expected  -LS     Ability to Comprehend Questions Progress  30%;with consistent cues;following model  -LS     Recorded by  [LS] Aida Coleman MS CCC-SLP     Improve word retrieval skills    Improve word retrieval skills by:  naming an object/pic;completing open ended structured sentence;completing open ended unstructured sentence;answer WH question with one word;80%  -LS     Status: Improve word retrieval skills  Progress slower than expected  -LS     Word Retrieval Skills Progress  60%;with inconsistent cues  -LS     Recorded by  [LS] Aida Coleman MS CCC-SLP     Improve ability to construct phrase and sentence level responses    Improve ability to construct phrase and sentence level responses by:  answering question with phrase;constructing a sentence with a key word;80%  -LS     Status: Improve ability to construct phrase and sentence level responses  Progress slower than expected  -LS     Constructing Phrase and Sentence Level Responses Progress  50%;with consistent cues  -LS     Recorded by  [LS] Aida Coleman MS CCC-SLP     Bed Mobility, Assessment/Treatment    Bed Mobility, Assistive Device head of bed elevated;bed rails;draw sheet  -TB      Bed Mobility, Roll Right, [place] moderate assist (50% patient effort);verbal cues required  -TB      Bed Mobility, Scoot/Bridge, [place] maximum assist (25% patient effort);verbal cues required  -TB      Bed Mob, Supine to Sit, [place]   moderate assist (50% patient effort);2 person assist required  -UD    Bed Mob, Sidelying to  Sit, [place] moderate assist (50% patient effort);verbal cues required  -TB      Bed Mob, Sit to Sidelying, [place] maximum assist (25% patient effort);verbal cues required  -TB      Bed Mobility, Safety Issues decreased use of arms for pushing/pulling;decreased use of legs for bridging/pushing  -TB      Bed Mobility, Impairments strength decreased  -TB      Bed Mobility, Comment Max encouragement  -TB      Recorded by [TB] Raquel Fung OT  [UD] Sofía Cade PTA    Transfer Assessment/Treatment    Transfers, Sit-Stand [place]   moderate assist (50% patient effort);2 person assist required  -UD    Transfers, Stand-Sit [place]   moderate assist (50% patient effort);2 person assist required  -UD    Transfer, Comment Refused  -TB      Recorded by [TB] Raquel Fung OT  [UD] Sofía Cade PTA    Gait Assessment/Treatment    Gait, [place] Level   moderate assist (50% patient effort);2 person assist required  -UD    Gait, Distance (Feet)   10  -UD    Gait, Gait Deviations   narrow base;decreased heel strike  -UD    Gait, Safety Issues   loses balance backward;supplemental O2;step length decreased  -UD    Gait, Impairments   strength decreased;impaired balance  -UD    Gait, Comment   --   did better with HH vs walker  -UD    Recorded by   [UD] Sofía Cade PTA    Functional Mobility    Functional Mobility- Comment Refused  -TB      Recorded by [TB] Raquel Fung OT      Upper Body Dressing Assessment/Training    UB Dressing Assess/Train, Clothing Type donning:;hospital gown  -TB      UB Dressing Assess/Train, Position sitting;edge of bed  -TB      UB Dressing Assess/Train, [place] moderate assist (50% patient effort);verbal cues required  -TB      UB Dressing Assess/Train, Impairments ROM decreased;strength decreased;impaired balance  -TB      UB Dressing Assess/Train, Comment increased time  -TB      Recorded by [TB] Raquel Fung OT      Grooming  Assessment/Training    Grooming Assess/Train, Position sitting;edge of bed  -TB      Grooming Assess/Train, [place] Level moderate assist (50% patient effort);verbal cues required  -TB      Grooming Assess/Train, Comment s/u to wash hands; Mod A to comb hair  -TB      Recorded by [TB] Raquel Fung OT      Self-Feeding Assessment/Training    Self-Feeding Assess/Train, Position supported sitting  -TB      Self-Feeding Assess/Train, [place] hand over hand  -TB      Self-Feeding Assess/Train, Spillage Amount minimal  -TB      Recorded by [TB] Raquel Fung OT      Balance Skills Training    Sitting-Level of Assistance Contact guard  -TB  Minimum assistance;x2  -UD    Sitting-Balance Support Right upper extremity supported;Left upper extremity supported  -TB      Sitting-Balance Activities Lateral lean;Forward lean;Reaching for objects;Reaching across midline;Trunk control activities  -TB  Forward lean  -UD    Sitting # of Minutes 10  -TB      Recorded by [TB] Raquel Fung OT  [UD] Sofía Cade PTA    Therapy Exercises    Bilateral Lower Extremities   PROM:;AAROM:;10 reps;sitting  -UD    Bilateral Upper Extremity AAROM:;10 reps;shoulder extension/flexion;shoulder abduction/adduction;shoulder horizontal abd/add;elbow flexion/extension;AROM:;hand pumps  -TB  AAROM:;10 reps;sitting  -UD    Recorded by [TB] Raquel Fung OT  [UD] Sofía Cade PTA    Positioning and Restraints    Pre-Treatment Position in bed  -TB  in bed  -UD    Post Treatment Position bed  -TB  chair  -UD    In Bed notified nsg;supine;call light within reach;encouraged to call for assist;exit alarm on  -TB      In Chair   notified nsg;reclined;sitting;call light within reach;exit alarm on;legs elevated  -UD    Recorded by [TB] Raquel Fung OT  [UD] Sofía Cade PTA      03/09/17 1330 03/08/17 1400       Rehab Assessment/Intervention    Discipline physical therapy assistant  - occupational therapist   -     Document Type therapy note (daily note)  - therapy note (daily note)  -     Subjective Information agree to therapy;complains of;weakness  -UD agree to therapy   Pt initially agreed to tx, then during tx refused  -     Patient Effort, Rehab Treatment adequate  -UD poor  -MC     Precautions/Limitations fall precautions  -UD fall precautions   Exit alarm  -MC     Recorded by [UD] Sofía Cade PTA [MC] Devika Tejeda, OT     Vital Signs    O2 Delivery Post Treatment room air  -UD      Pre Patient Position Supine  -UD      Intra Patient Position Standing  -UD      Post Patient Position Sitting  -UD      Recorded by [UD] Sofía Cade PTA      Pain Assessment    Pain Assessment Pierce-Vieyra FACES  -UD No/denies pain  -     Pierce-Vieyra FACES Pain Rating 0  -UD      Recorded by [UD] Sofía Cade PTA [] Devika Tejeda, OT     Cognitive Assessment/Intervention    Current Cognitive/Communication Assessment  impaired  -     Orientation Status oriented to;person;situation  -UD oriented to;person;place;time;other (see comments)   With choices  -     Follows Commands/Answers Questions 50% of the time  -UD 25% of the time  -     Personal Safety Interventions fall prevention program maintained  -UD      Recorded by [UD] Sofía Cade PTA [MC] Devika Tejeda, OT     Bed Mobility, Assessment/Treatment    Bed Mob, Supine to Sit, [place] moderate assist (50% patient effort);maximum assist (25% patient effort);2 person assist required  -UD      Bed Mobility, Comment  Refused  -MC     Recorded by [UD] Sofía Cade PTA [MC] Devika Tejeda, OT     Transfer Assessment/Treatment    Transfers, Bed-Chair [place] moderate assist (50% patient effort);maximum assist (25% patient effort);2 person assist required  -UD      Transfers, Sit-Stand [place] moderate assist (50% patient effort);maximum assist (25% patient effort);2 person assist required  -UD      Transfers, Stand-Sit [place] moderate assist (50% patient  effort);maximum assist (25% patient effort);2 person assist required  -UD      Recorded by [UD] Sofía Cade PTA      Gait Assessment/Treatment    Gait, [place] Level moderate assist (50% patient effort);2 person assist required  -UD      Gait, Distance (Feet) 5   to chair only  -UD      Gait, Gait Deviations narrow base;step length decreased   leans to rt  -UD      Gait, Safety Issues step length decreased  -UD      Gait, Impairments strength decreased;impaired balance  -UD      Recorded by [UD] Sofía Cade PTA      Balance Skills Training    Sitting-Level of Assistance Minimum assistance;x2  -UD      Sitting-Balance Activities Forward lean;Lateral lean  -UD      Recorded by [UD] Sofía Cade PTA      Therapy Exercises    Bilateral Lower Extremities PROM:;AAROM:;10 reps;sitting  -UD AAROM:;10 reps  -MC     Bilateral Upper Extremity AAROM:;10 reps;sitting  -UD --   Refused  -MC     Recorded by [UD] Sofía Cade PTA [] Devika Tejeda OT     Positioning and Restraints    Pre-Treatment Position in bed  -UD in bed  -MC     Post Treatment Position chair  -UD bed  -MC     In Bed  notified nsg;supine;call light within reach;encouraged to call for assist;exit alarm on;side rails up x3  -MC     In Chair notified nsg;reclined;sitting;call light within reach;exit alarm on;legs elevated  -UD      Recorded by [UD] Sofía Cade PTA [] Devika Tejeda OT       User Key  (r) = Recorded By, (t) = Taken By, (c) = Cosigned By    Initials Name Effective Dates    TB Raquel Fung, RIYA 06/22/15 -     FILI Cade PTA 06/22/15 -      Aida Coleman MS [facility]-SLP 06/22/15 -      Devika Tejeda, OT 03/14/16 -                 OT Goals       03/11/17 1201 03/07/17 1019       Bed Mobility OT LTG    Bed Mobility OT LTG, Date Established  03/07/17  -AN     Bed Mobility OT LTG, Time to Achieve  1 wk  -AN     Bed Mobility OT LTG, Activity Type  supine to sit/sit to supine  -AN     Bed Mobility OT LTG, [place] Level   minimum assist (75% patient effort)  -AN     Bed Mobility OT LTG, Outcome goal ongoing  -TB      Transfer Training 2 OT STG    Transfer Training 2 OT STG, Date Established  03/07/17  -AN     Transfer Training 2 OT STG, Time to Achieve  1 wk  -AN     Transfer Training 2 OT STG, Activity Type  toilet  -AN     Transfer Training 2 OT STG, [place] Level  minimum assist (75% patient effort)  -AN     Transfer Training 2 OT STG, Outcome goal ongoing   Refused transfer  -TB      Toileting OT LTG    Toileting Goal OT LTG, Date Established  03/07/17  -AN     Toileting Goal OT LTG, Time to Achieve  1 wk  -AN     Toileting Goal OT LTG, [place] Level  minimum assist (75% patient effort)  -AN     Toileting Goal OT LTG, Outcome goal ongoing   Refused BSC transfer  -TB      Coordination OT LTG    Coordination OT LTG, Date Established  03/07/17  -AN     Coordination OT LTG, Time to Achieve  1 wk  -AN     Coordination OT LTG, Activity Type  FM task;GM task  -AN     Coordination OT LTG, [place] Level  standby assist  -AN     Coordination OT LTG, Outcome goal ongoing  -TB        User Key  (r) = Recorded By, (t) = Taken By, (c) = Cosigned By    Initials Name Provider Type    TB Raquel Fung, OT Occupational Therapist    KAR Mullen OT Occupational Therapist          Occupational Therapy Education     Title: PT OT SLP Therapies (Active)     Topic: Occupational Therapy (Done)     Point: ADL training (Done)    Description: Instruct learner(s) on proper safety adaptation and remediation techniques during self care or transfers.   Instruct in proper use of assistive devices.    Learning Progress Summary    Learner Readiness Method Response Comment Documented by Status   Patient Acceptance E,D SAMATNHANR Education reinforced for benefits of activity/therapy and HEP TB 03/11/17 1200 Done    Acceptance E,TB NR   03/10/17 0611 Active    Acceptance E SAMANTHANR Educated pt and fm on POC, need for assist with ADl's at this  time. AN 03/07/17 1018 Done   Family Acceptance E VU,NR Educated pt and fm on POC, need for assist with ADl's at this time. AN 03/07/17 1018 Done               Point: Home exercise program (Done)    Description: Instruct learner(s) on appropriate technique for monitoring, assisting and/or progressing therapeutic exercises/activities.    Learning Progress Summary    Learner Readiness Method Response Comment Documented by Status   Patient Acceptance E,D VU,NR Education reinforced for benefits of activity/therapy and HEP TB 03/11/17 1200 Done    Acceptance E,TB NR   03/10/17 0611 Active    Refuses E NR   03/08/17 1432 Active    Acceptance E VU,NR Educated pt and fm on POC, need for assist with ADl's at this time. AN 03/07/17 1018 Done   Family Acceptance E VU,NR Educated pt and fm on POC, need for assist with ADl's at this time. AN 03/07/17 1018 Done                      User Key     Initials Effective Dates Name Provider Type Discipline     06/22/15 -  Raquel Fung, OT Occupational Therapist OT     06/22/15 -  Shauna Mullen, OT Occupational Therapist OT     03/14/16 -  Devika Tejeda, OT Occupational Therapist OT     06/16/16 -  Francesca Nova, RN Registered Nurse Nurse                  OT Recommendation and Plan  Anticipated Equipment Needs At Discharge:  (TBD)  Anticipated Discharge Disposition: skilled nursing facility  Therapy Frequency: daily  Plan of Care Review  Plan Of Care Reviewed With: patient  Outcome Summary/Follow up Plan: Pt confusion limits participation and progress. Recommend continued skilled OT pending participate -consider change to 3xweek.         Outcome Measures       03/11/17 1120 03/10/17 0940 03/09/17 1330    How much help from another person do you currently need...    Turning from your back to your side while in flat bed without using bedrails?  2  -UD 2  -UD    Moving from lying on back to sitting on the side of a flat bed without bedrails?  2  -UD 2  -UD    Moving  to and from a bed to a chair (including a wheelchair)?  2  -UD 2  -UD    Standing up from a chair using your arms (e.g., wheelchair, bedside chair)?  2  -UD 2  -UD    Climbing 3-5 steps with a railing?  1  -UD 1  -UD    To walk in hospital room?  2  -UD 2  -UD    AM-PAC 6 Clicks Score  11  -UD 11  -UD    How much help from another is currently needed...    Putting on and taking off regular lower body clothing? 2  -TB      Bathing (including washing, rinsing, and drying) 2  -TB      Toileting (which includes using toilet bed pan or urinal) 2  -TB      Putting on and taking off regular upper body clothing 2  -TB      Taking care of personal grooming (such as brushing teeth) 2  -TB      Eating meals 2  -TB      Score 12  -TB      Functional Assessment    Outcome Measure Options AM-PAC 6 Clicks Daily Activity (OT)  -TB        03/08/17 1400          How much help from another is currently needed...    Putting on and taking off regular lower body clothing? 2  -MC      Bathing (including washing, rinsing, and drying) 2  -MC      Toileting (which includes using toilet bed pan or urinal) 2  -MC      Putting on and taking off regular upper body clothing 2  -MC      Taking care of personal grooming (such as brushing teeth) 2  -MC      Eating meals 2  -MC      Score 12  -      Functional Assessment    Outcome Measure Options AM-PAC 6 Clicks Daily Activity (OT)  -        User Key  (r) = Recorded By, (t) = Taken By, (c) = Cosigned By    Initials Name Provider Type    TB Raquel Fung, OT Occupational Therapist    FILI Cade, [facility] [place] Physical Therapy Assistant    JERZY Tejeda, OT Occupational Therapist           Time Calculation:         Time Calculation- OT       03/11/17 1204          Time Calculation- OT    OT Start Time 1120  -TB      Total Timed Code Minutes- OT 23 minute(s)  -TB      OT Received On 03/11/17  -TB      OT Goal Re-Cert Due Date 03/17/17  -TB        User Key  (r) = Recorded By, (t) = Taken By, (c)  = Cosigned By    Initials Name Provider Type    TB Raquel Fung OT Occupational Therapist           Therapy Charges for Today     Code Description Service Date Service Provider Modifiers Qty    [id number] HC OT THERAPEUTIC ACT EA 15 MIN 3/11/2017 Raquel Fung OT GO 2          OT G-codes  OT Functional Scales Options: AM-PAC 6 Clicks Daily Activity (OT)  Score: 12  Functional Limitation: Self care  Self Care Current Status (): At least 60 percent but less than 80 percent impaired, limited or restricted  Self Care Goal Status (): At least 40 percent but less than 60 percent impaired, limited or restricted    Raquel Fung OT  3/11/2017

## 2017-03-11 NOTE — PLAN OF CARE
Problem: Patient Care Overview (Adult)  Goal: Plan of Care Review  Outcome: Ongoing (interventions implemented as appropriate)    03/11/17 1201   Coping/Psychosocial Response Interventions   Plan Of Care Reviewed With patient   Outcome Evaluation   Outcome Summary/Follow up Plan Pt confusion limits participation and progress. Recommend continued skilled OT pending participate -consider change to 3xweek.          Problem: Inpatient Occupational Therapy  Goal: Bed Mobility Goal LTG- OT  Outcome: Ongoing (interventions implemented as appropriate)    03/07/17 1019 03/11/17 1201   Bed Mobility OT LTG   Bed Mobility OT LTG, Date Established 03/07/17 --    Bed Mobility OT LTG, Time to Achieve 1 wk --    Bed Mobility OT LTG, Activity Type supine to sit/sit to supine --    Bed Mobility OT LTG, [place] Level minimum assist (75% patient effort) --    Bed Mobility OT LTG, Outcome --  goal ongoing       Goal: Transfer Training Goal 2 STG- OT  Outcome: Ongoing (interventions implemented as appropriate)    03/07/17 1019 03/11/17 1201   Transfer Training 2 OT STG   Transfer Training 2 OT STG, Date Established 03/07/17 --    Transfer Training 2 OT STG, Time to Achieve 1 wk --    Transfer Training 2 OT STG, Activity Type toilet --    Transfer Training 2 OT STG, [place] Level minimum assist (75% patient effort) --    Transfer Training 2 OT STG, Outcome --  goal ongoing  (Refused transfer)       Goal: Toileting Goal LTG- OT  Outcome: Ongoing (interventions implemented as appropriate)    03/07/17 1019 03/11/17 1201   Toileting OT LTG   Toileting Goal OT LTG, Date Established 03/07/17 --    Toileting Goal OT LTG, Time to Achieve 1 wk --    Toileting Goal OT LTG, [place] Level minimum assist (75% patient effort) --    Toileting Goal OT LTG, Outcome --  goal ongoing  (Refused BSC transfer)       Goal: Coordination Goal LTG- OT  Outcome: Ongoing (interventions implemented as appropriate)    03/07/17 1019 03/11/17 1201    Coordination OT LTG   Coordination OT LTG, Date Established 03/07/17 --    Coordination OT LTG, Time to Achieve 1 wk --    Coordination OT LTG, Activity Type FM task;GM task --    Coordination OT LTG, [place] Level standby assist --    Coordination OT LTG, Outcome --  goal ongoing

## 2017-03-12 LAB — BACTERIA SPEC AEROBE CULT: NORMAL

## 2017-03-12 PROCEDURE — 97110 THERAPEUTIC EXERCISES: CPT

## 2017-03-12 PROCEDURE — 96376 TX/PRO/DX INJ SAME DRUG ADON: CPT

## 2017-03-12 PROCEDURE — 99225 PR SBSQ OBSERVATION CARE/DAY 25 MINUTES: CPT | Performed by: NURSE PRACTITIONER

## 2017-03-12 PROCEDURE — G0378 HOSPITAL OBSERVATION PER HR: HCPCS

## 2017-03-12 PROCEDURE — 25010000002 LORAZEPAM PER 2 MG: Performed by: INTERNAL MEDICINE

## 2017-03-12 PROCEDURE — 97116 GAIT TRAINING THERAPY: CPT

## 2017-03-12 RX ADMIN — ATENOLOL 50 MG: 50 TABLET ORAL at 21:02

## 2017-03-12 RX ADMIN — METHYLPHENIDATE HYDROCHLORIDE 5 MG: 5 TABLET ORAL at 09:42

## 2017-03-12 RX ADMIN — DILTIAZEM HYDROCHLORIDE 240 MG: 240 CAPSULE, COATED, EXTENDED RELEASE ORAL at 21:02

## 2017-03-12 RX ADMIN — BETHANECHOL CHLORIDE 10 MG: 10 TABLET ORAL at 21:02

## 2017-03-12 RX ADMIN — ATENOLOL 50 MG: 50 TABLET ORAL at 09:43

## 2017-03-12 RX ADMIN — Medication 250 MG: at 17:19

## 2017-03-12 RX ADMIN — BETHANECHOL CHLORIDE 10 MG: 10 TABLET ORAL at 09:42

## 2017-03-12 RX ADMIN — BETHANECHOL CHLORIDE 10 MG: 10 TABLET ORAL at 17:19

## 2017-03-12 RX ADMIN — ATORVASTATIN CALCIUM 80 MG: 40 TABLET, FILM COATED ORAL at 21:02

## 2017-03-12 RX ADMIN — Medication 250 MG: at 00:45

## 2017-03-12 RX ADMIN — FLUOXETINE HYDROCHLORIDE 20 MG: 20 CAPSULE ORAL at 09:43

## 2017-03-12 RX ADMIN — ASPIRIN 325 MG ORAL TABLET 325 MG: 325 PILL ORAL at 09:42

## 2017-03-12 RX ADMIN — PANTOPRAZOLE SODIUM 40 MG: 40 TABLET, DELAYED RELEASE ORAL at 09:42

## 2017-03-12 RX ADMIN — Medication 400 MG: at 17:19

## 2017-03-12 RX ADMIN — RIVASTIGMINE TRANSDERMAL SYSTEM 1 PATCH: 4.6 PATCH, EXTENDED RELEASE TRANSDERMAL at 09:42

## 2017-03-12 RX ADMIN — Medication 400 MG: at 09:43

## 2017-03-12 RX ADMIN — LORAZEPAM 0.5 MG: 2 INJECTION INTRAMUSCULAR; INTRAVENOUS at 21:13

## 2017-03-12 RX ADMIN — CLOPIDOGREL BISULFATE 75 MG: 75 TABLET, FILM COATED ORAL at 09:42

## 2017-03-12 RX ADMIN — Medication 250 MG: at 09:42

## 2017-03-12 RX ADMIN — DOXAZOSIN MESYLATE 2 MG: 1 TABLET ORAL at 21:02

## 2017-03-12 NOTE — PROGRESS NOTES
[facility] Medicine Services  INPATIENT PROGRESS NOTE    Date of Admission: 3/6/2017  Length of Stay: 0  Primary Care Physician: No Known Provider    Subjective   CC: hospital follow up    HPI:  Says she is constipated, will add a stool softener   Feels somewhat weak in general    Review Of Systems:   Review of Systems   Unable to perform ROS: Dementia         Objective      Temp:  [98.9 °F (37.2 °C)] 98.9 °F (37.2 °C)  Heart Rate:  [82-95] 88  Resp:  [14-18] 18  BP: (101-147)/(61-85) 147/61  Physical Exam  Alert, lying flat in bed  Speech clear, face symmetric, mild confusion  RRR, no murmur rub or gallop  CTAB  Abdomen soft, non tender, non distended, normal bowel sounds  No lower extremity cyanosis, clubbing or edema  Normal affect  Moves all extremities but must coax her to wiggle toes      Results Review:    I have reviewed the labs, radiology results and diagnostic studies.      Results from last 7 days  Lab Units 03/10/17  0710   WBC 10*3/mm3 8.55   HEMOGLOBIN g/dL 11.2*   PLATELETS 10*3/mm3 221       Results from last 7 days  Lab Units 03/10/17  0710   SODIUM mmol/L 136   POTASSIUM mmol/L 3.6   CHLORIDE mmol/L 103   TOTAL CO2 mmol/L 29.0   BUN mg/dL 16   CREATININE mg/dL 0.70   GLUCOSE mg/dL 117*   CALCIUM mg/dL 10.0       Culture Data: Cultures:    BLOOD CULTURE   Date Value Ref Range Status   03/05/2017 No growth at 3 days  Preliminary   03/05/2017 No growth at 3 days  Preliminary       Radiology Data: no new    I have reviewed the medications.    aspirin 325 mg Oral Daily   Or      aspirin 300 mg Rectal Daily   atenolol 50 mg Oral Q12H   atorvastatin 80 mg Oral Nightly   bethanechol 10 mg Oral TID   ceftriaxone 1 g Intravenous Q24H   clopidogrel 75 mg Oral Daily   diltiaZEM  mg Oral Nightly   doxazosin 2 mg Oral Nightly   FLUoxetine 20 mg Oral Daily   magnesium oxide 400 mg Oral BID   methylphenidate 5 mg Oral Daily   pantoprazole 40 mg Oral Daily   polyethylene glycol 17  g Oral Daily   rivastigmine 1 patch Transdermal Daily   saccharomyces boulardii 250 mg Oral BID   sennosides-docusate sodium 2 tablet Oral Nightly       Assessment/Plan     Problem List  Principal Problem:    Altered mental status, unspecified  Active Problems:    Hypertension    Carotid stenosis    Cerebrovascular accident (CVA)    Hypertensive emergency    Urinary retention    UTI (urinary tract infection)           Assessment/Plan:  --Day #3 of rocephin, dc after 3rd dose unless culture abnormal  --urology following  --follow urine culture  --follow up with neuro after dc  --add stool softener  --BP remains stable  --transfer Tuesday    DVT prophylaxis: TEDS/SCDS  Discharge Planning: I expect patient to be discharged to NH on Tuesday for long-term care     Robbin Phillips MD   03/11/17   9:42 PM    Please note that portions of this note may have been completed with a voice recognition program. Efforts were made to edit the dictations, but occasionally words are mistranscribed.

## 2017-03-12 NOTE — PROGRESS NOTES
Acute Care - Physical Therapy Treatment Note  [facility]     Patient Name: Loni Walker  : 1940  MRN: [id number]  Today's Date: 3/12/2017  Onset of Illness/Injury or Date of Surgery Date: 17  Date of Referral to PT: 17  Referring Physician: GABRIELLE GASPAR    Admit Date: 3/6/2017    Visit Dx:    ICD-10-CM ICD-9-CM   1. Altered mental status, unspecified R41.82 780.97   2. Weakness R53.1 780.79   3. FTT (failure to thrive) in adult R62.7 783.7   4. Impaired mobility and ADLs Z74.09 799.89   5. Impaired functional mobility, balance, gait, and endurance Z74.09 V49.89     Patient Active Problem List   Diagnosis   • Hypertension   • CVA (cerebral vascular accident)   • Carotid stenosis   • Constipation   • Cerebrovascular accident (CVA)   • Altered mental status, unspecified   • Hypertensive emergency   • Urinary retention   • UTI (urinary tract infection)               Adult Rehabilitation Note       17 1020 17 1120 03/10/17 1000    Rehab Assessment/Intervention    Discipline physical therapist  -KM occupational therapist  -TB speech language pathologist  -LS    Document Type therapy note (daily note)  -KM therapy note (daily note)  -TB therapy note (daily note)  -LS    Subjective Information agree to therapy  -KM agree to therapy   with encouragement  -TB agree to therapy  -LS    Patient Effort, Rehab Treatment adequate  -KM fair  -TB fair  -LS    Precautions/Limitations fall precautions  -KM fall precautions  -TB     Specific Treatment Considerations  Confusion and fear of falling limit participation  -TB     Recorded by [KM] Rosaura Beaver, PT [TB] Raquel Fung, OT [LS] Aida Coleman, MS CCC-SLP    Vital Signs    Pre Treatment Diastolic BP  --   BP stable  -TB     O2 Delivery Post Treatment  supplemental O2  -TB     Pre Patient Position  Supine  -TB     Intra Patient Position  Sitting  -TB     Post Patient Position  Supine  -TB     Recorded by  [TB] Raquel Chamberlain  RIYA Fung     Pain Assessment    Pain Assessment No/denies pain  -KM Pierce-Baker FACES  -TB     Pierce-Vieyra FACES Pain Rating  2  -TB     Pain Score  2  -TB     Post Pain Score  2  -TB     Pain Type  Chronic pain  -TB     Pain Location  Back  -TB     Pain Orientation  Right  -TB     Pain Intervention(s)  Repositioned  -TB     Recorded by [KM] Rosaura Beaver, PT [TB] Raquel Fung OT     Cognitive Assessment/Intervention    Current Cognitive/Communication Assessment impaired  -KM impaired  -TB     Orientation Status oriented to;person;place  -KM oriented to;person;place  -TB     Follows Commands/Answers Questions able to follow single-step instructions;100% of the time  -KM able to follow single-step instructions;needs cueing;needs repetition;75% of the time  -TB     Personal Safety mild impairment  -KM moderate impairment;decreased insight to deficits  -TB     Personal Safety Interventions fall prevention program maintained  -KM fall prevention program maintained;gait belt;nonskid shoes/slippers when out of bed   Exit alarms  -TB     Recorded by [KM] Rosaura Beaver, PT [TB] Raquel Fung OT     Improve ability to comprehend words/phrases/sentences    Improve ability to comprehend words/phrases/sentences through:   match objects, field of;match pictures, field of;identify pictures, field of;identify objects, field of;80%  -LS    Status: Improve ability to comprehend words/phrases/sentences   Progressing as expected  -LS    Ability to Comprehend Words/Phrases/Sentences Progress   60%;with inconsistent cues  -LS    Recorded by   [LS] Aida Coleman MS CCC-SLP    Improve ability to follow directions    Improve ability to follow directions:   one-step directions with objects;one-step directions without objects;demonstrate function of object;80%  -LS    Status: Improve ability to follow directions   Progress slower than expected  -LS    Ability to Follow Directions Progress   50%;with  consistent cues  -LS    Comments: Improve ability to follow directions   increased confusion/agitation likely impacting preformance  -LS    Recorded by   [LS] Aida Coleman MS CCC-SLP    Improve ability to comprehend questions    Improve ability to comprehend questions:   complex yes/no questions;simple general questions;80%  -LS    Status: Improve ability to comprehend questions   Progress slower than expected  -LS    Ability to Comprehend Questions Progress   30%;with consistent cues;following model  -LS    Recorded by   [LS] Aida Coleman MS CCC-SLP    Improve word retrieval skills    Improve word retrieval skills by:   naming an object/pic;completing open ended structured sentence;completing open ended unstructured sentence;answer WH question with one word;80%  -LS    Status: Improve word retrieval skills   Progress slower than expected  -LS    Word Retrieval Skills Progress   60%;with inconsistent cues  -LS    Recorded by   [LS] Aida Coleman MS CCC-SLP    Improve ability to construct phrase and sentence level responses    Improve ability to construct phrase and sentence level responses by:   answering question with phrase;constructing a sentence with a key word;80%  -LS    Status: Improve ability to construct phrase and sentence level responses   Progress slower than expected  -LS    Constructing Phrase and Sentence Level Responses Progress   50%;with consistent cues  -LS    Recorded by   [LS] Aida Coleman MS CCC-SLP    Bed Mobility, Assessment/Treatment    Bed Mobility, Assistive Device  head of bed elevated;bed rails;draw sheet  -TB     Bed Mobility, Roll Right, Tell  moderate assist (50% patient effort);verbal cues required  -TB     Bed Mobility, Scoot/Bridge, Tell  maximum assist (25% patient effort);verbal cues required  -TB     Bed Mob, Sidelying to Sit, [place]  moderate assist (50% patient effort);verbal cues required  -TB     Bed Mob, Sit to Sidelying, Tell   maximum assist (25% patient effort);verbal cues required  -TB     Bed Mobility, Safety Issues  decreased use of arms for pushing/pulling;decreased use of legs for bridging/pushing  -TB     Bed Mobility, Impairments  strength decreased  -TB     Bed Mobility, Comment on BSC  -KM Max encouragement  -TB     Recorded by [KM] Rosaura Beaver, PT [TB] Raquel Fung OT     Transfer Assessment/Treatment    Transfers, Sit-Stand [place] minimum assist (75% patient effort);verbal cues required  -KM      Transfers, Stand-Sit [place] verbal cues required;contact guard assist  -KM      Transfers, Sit-Stand-Sit, Assist Device rolling walker  -KM      Transfer, Comment  Refused  -TB     Recorded by [KM] Rosaura Beaver, PT [TB] Raquel Fung OT     Gait Assessment/Treatment    Gait, [place] Level minimum assist (75% patient effort)  -KM      Gait, Assistive Device rolling walker  -KM      Gait, Distance (Feet) 20  -KM      Gait, Gait Deviations narrow base;step length decreased  -KM      Gait, Safety Issues loses balance backward  -KM      Recorded by [KM] Rosaura Beaver, PT      Functional Mobility    Functional Mobility- Comment  Refused  -TB     Recorded by  [TB] Raquel Fung OT     Upper Body Dressing Assessment/Training    UB Dressing Assess/Train, Clothing Type  donning:;hospital gown  -TB     UB Dressing Assess/Train, Position  sitting;edge of bed  -TB     UB Dressing Assess/Train, [place]  moderate assist (50% patient effort);verbal cues required  -TB     UB Dressing Assess/Train, Impairments  ROM decreased;strength decreased;impaired balance  -TB     UB Dressing Assess/Train, Comment  increased time  -TB     Recorded by  [TB] Raquel Fung OT     Grooming Assessment/Training    Grooming Assess/Train, Position  sitting;edge of bed  -TB     Grooming Assess/Train, Indepen Level  moderate assist (50% patient effort);verbal cues required  -TB      Grooming Assess/Train, Comment  s/u to wash hands; Mod A to comb hair  -TB     Recorded by  [TB] Raquel Fung OT     Self-Feeding Assessment/Training    Self-Feeding Assess/Train, Position  supported sitting  -TB     Self-Feeding Assess/Train, [place]  hand over hand  -TB     Self-Feeding Assess/Train, Spillage Amount  minimal  -TB     Recorded by  [TB] Raquel Fung OT     Balance Skills Training    Sitting-Level of Assistance  Contact guard  -TB     Sitting-Balance Support  Right upper extremity supported;Left upper extremity supported  -TB     Sitting-Balance Activities  Lateral lean;Forward lean;Reaching for objects;Reaching across midline;Trunk control activities  -TB     Sitting # of Minutes  10  -TB     Recorded by  [TB] Raquel Fung OT     Therapy Exercises    Bilateral Lower Extremities AAROM:;10 reps;sitting;ankle pumps/circles;calf stretch;heel raises;hip abduction/adduction;hip flexion;LAQ  -KM      Bilateral Upper Extremity  AAROM:;10 reps;shoulder extension/flexion;shoulder abduction/adduction;shoulder horizontal abd/add;elbow flexion/extension;AROM:;hand pumps  -TB     Recorded by [KM] Rosaura Beaver, PT [TB] Raquel Fung, RIYA     Positioning and Restraints    Pre-Treatment Position bedside commode  -KM in bed  -TB     Post Treatment Position chair  -KM bed  -TB     In Bed  notified nsg;supine;call light within reach;encouraged to call for assist;exit alarm on  -TB     In Chair reclined;call light within reach;encouraged to call for assist;exit alarm on  -KM      Recorded by [KM] Rosaura Beaver, PT [TB] Raquel Fung OT       03/10/17 0940 03/09/17 1330       Rehab Assessment/Intervention    Discipline physical therapy assistant  -UD physical therapy assistant  -UD     Document Type therapy note (daily note)  -UD therapy note (daily note)  -UD     Subjective Information agree to therapy;complains of;pain  -UD agree to therapy;complains  of;weakness  -UD     Patient Effort, Rehab Treatment good  -UD adequate  -UD     Precautions/Limitations fall precautions  -UD fall precautions  -UD     Recorded by [UD] Sofía Cade PTA [UD] Sofía Cade PTA     Vital Signs    O2 Delivery Post Treatment supplemental O2  -UD room air  -UD     Pre Patient Position Supine  -UD Supine  -UD     Intra Patient Position Standing  -UD Standing  -UD     Post Patient Position Sitting  -UD Sitting  -UD     Recorded by [UD] Sofía Cade PTA [UD] Sofía Cade PTA     Pain Assessment    Pain Assessment Pierce-Vieyra FACES  -UD Pierce-Baker FACES  -UD     Pierce-Vieyra FACES Pain Rating 2  -UD 0  -UD     Pain Type Chronic pain  -UD      Pain Location Back  -UD      Pain Intervention(s) Repositioned  -UD      Recorded by [UD] Sofía Cade PTA [UD] Sofía Cade PTA     Cognitive Assessment/Intervention    Orientation Status oriented to;person;situation  -UD oriented to;person;situation  -UD     Follows Commands/Answers Questions 75% of the time  -UD 50% of the time  -UD     Personal Safety Interventions  fall prevention program maintained  -UD     Recorded by [UD] Sofía Cade PTA [UD] Sofía Cade PTA     Bed Mobility, Assessment/Treatment    Bed Mob, Supine to Sit, [place] moderate assist (50% patient effort);2 person assist required  -UD moderate assist (50% patient effort);maximum assist (25% patient effort);2 person assist required  -UD     Recorded by [UD] Sofía Cade PTA [UD] Soífa Cade PTA     Transfer Assessment/Treatment    Transfers, Bed-Chair [place]  moderate assist (50% patient effort);maximum assist (25% patient effort);2 person assist required  -UD     Transfers, Sit-Stand [place] moderate assist (50% patient effort);2 person assist required  -UD moderate assist (50% patient effort);maximum assist (25% patient effort);2 person assist required  -UD     Transfers, Stand-Sit [place] moderate assist (50% patient effort);2 person assist required   -UD moderate assist (50% patient effort);maximum assist (25% patient effort);2 person assist required  -UD     Recorded by [UD] Sofía Cade PTA [UD] Sofía Cade PTA     Gait Assessment/Treatment    Gait, [place] Level moderate assist (50% patient effort);2 person assist required  -UD moderate assist (50% patient effort);2 person assist required  -UD     Gait, Distance (Feet) 10  -UD 5   to chair only  -UD     Gait, Gait Deviations narrow base;decreased heel strike  -UD narrow base;step length decreased   leans to rt  -UD     Gait, Safety Issues loses balance backward;supplemental O2;step length decreased  -UD step length decreased  -UD     Gait, Impairments strength decreased;impaired balance  -UD strength decreased;impaired balance  -UD     Gait, Comment --   did better with HH vs walker  -UD      Recorded by [UD] Sofía Cade PTA [UD] Sofía Cade PTA     Balance Skills Training    Sitting-Level of Assistance Minimum assistance;x2  -UD Minimum assistance;x2  -UD     Sitting-Balance Activities Forward lean  -UD Forward lean;Lateral lean  -UD     Recorded by [UD] Sofía Cade PTA [UD] Sofía Cade PTA     Therapy Exercises    Bilateral Lower Extremities PROM:;AAROM:;10 reps;sitting  -UD PROM:;AAROM:;10 reps;sitting  -UD     Bilateral Upper Extremity AAROM:;10 reps;sitting  -UD AAROM:;10 reps;sitting  -UD     Recorded by [UD] Sofía Cade PTA [UD] Sofía Cade PTA     Positioning and Restraints    Pre-Treatment Position in bed  -UD in bed  -UD     Post Treatment Position chair  -UD chair  -UD     In Chair notified nsg;reclined;sitting;call light within reach;exit alarm on;legs elevated  -UD notified nsg;reclined;sitting;call light within reach;exit alarm on;legs elevated  -UD     Recorded by [UD] Sofía Cade PTA [UD] Sofía Cade PTA       User Key  (r) = Recorded By, (t) = Taken By, (c) = Cosigned By    Initials Name Effective Dates    TB Raquel Cintiakarel Fung, OT 06/22/15 -     UD Sofía Cade  PTA 06/22/15 -     KM Rosaura Beaver, PT 06/19/15 -     LS Aida Coleman, MS CCC-SLP 06/22/15 -                 IP PT Goals       03/12/17 1058 03/10/17 1025 03/09/17 1412    Bed Mobility PT LTG    Bed Mobility PT LTG, Outcome goal ongoing  -KM goal ongoing  -UD goal ongoing  -UD    Transfer Training PT LTG    Transfer Training PT LTG, Outcome goal ongoing  -KM goal ongoing  -UD goal ongoing  -UD    Gait Training PT LTG    Gait Training Goal PT LTG, Outcome goal ongoing  -KM goal ongoing  -UD goal ongoing  -UD      03/07/17 1008          Bed Mobility PT LTG    Bed Mobility PT LTG, Time to Achieve 2 wks  -CD      Bed Mobility PT LTG, Activity Type all bed mobility  -CD      Bed Mobility PT LTG, [place] minimum assist (75% patient effort)  -CD      Transfer Training PT LTG    Transfer Training PT LTG, Time to Achieve 2 wks  -CD      Transfer Training PT LTG, Activity Type all transfers  -CD      Transfer Training PT LTG, [place] minimum assist (75% patient effort)  -CD      Transfer Training PT LTG, Assist Device walker, rolling  -CD      Gait Training PT LTG    Gait Training Goal PT LTG, Time to Achieve 2 wks  -CD      Gait Training Goal PT LTG, [place] moderate assist (50% patient effort)  -CD      Gait Training Goal PT LTG, Assist Device walker, rolling  -CD      Gait Training Goal PT LTG, Distance to Achieve 150  -CD        User Key  (r) = Recorded By, (t) = Taken By, (c) = Cosigned By    Initials Name Provider Type    UD Sofía Cade, PTA Physical Therapy Assistant    MELISSA Aleman, PT Physical Therapist     Rosaura Beaver, PT Physical Therapist          Physical Therapy Education     Title: PT OT SLP Therapies (Active)     Topic: Physical Therapy (Done)     Point: Mobility training (Done)    Learning Progress Summary    Learner Readiness Method Response Comment Documented by Status   Patient Acceptance LETY PERES 03/12/17 1057 Done    Acceptance ED NR  UD  03/10/17 1025 Active    Acceptance E,TB NR   03/10/17 0611 Active    Acceptance D,E NR   03/09/17 1411 Active    Acceptance E VU,NR BENEFITS OF OOB ACTIVITY, SAFETY WITH MOBILITY, POSITIONING, D/C PLANNING.  03/07/17 1007 Done               Point: Home exercise program (Done)    Learning Progress Summary    Learner Readiness Method Response Comment Documented by Status   Patient Acceptance E VU,NR   03/12/17 1057 Done    Acceptance E,D NR   03/10/17 1025 Active    Acceptance E,TB [facility] 03/10/17 0611 Active    Acceptance D,E NR   03/09/17 1411 Active    Acceptance E VU,NR BENEFITS OF OOB ACTIVITY, SAFETY WITH MOBILITY, POSITIONING, D/C PLANNING.  03/07/17 1007 Done               Point: Body mechanics (Done)    Learning Progress Summary    Learner Readiness Method Response Comment Documented by Status   Patient Acceptance E VU,NR   03/12/17 1057 Done    Acceptance E,D NR   03/10/17 1025 Active    Acceptance E,TB NR   03/10/17 0611 Active    Acceptance D,E NR   03/09/17 1411 Active    Acceptance E VU,NR BENEFITS OF OOB ACTIVITY, SAFETY WITH MOBILITY, POSITIONING, D/C PLANNING.  03/07/17 1007 Done               Point: Precautions (Done)    Learning Progress Summary    Learner Readiness Method Response Comment Documented by Status   Patient Acceptance E VU,NR   03/12/17 1057 Done    Acceptance E,D NR   03/10/17 1025 Active    Acceptance E,TB [facility] 03/10/17 0611 Active    Acceptance D,E NR   03/09/17 1411 Active    Acceptance E VU,NR BENEFITS OF OOB ACTIVITY, SAFETY WITH MOBILITY, POSITIONING, D/C PLANNING.  03/07/17 1007 Done                      User Key     Initials Effective Dates Name Provider Type Discipline     06/22/15 -  Sofía Cade, PTA Physical Therapy Assistant PT     06/19/15 -  Sheri Aleman, PT Physical Therapist PT     06/19/15 -  Rosaura Beaver, PT Physical Therapist PT     06/16/16 -  Francesca Nova, RN Registered Nurse Nurse                    PT  Recommendation and Plan  Anticipated Discharge Disposition: skilled nursing facility  PT Frequency: daily  Plan of Care Review  Outcome Summary/Follow up Plan: Pt able to progress gait distance with less assist, making gradual progress.          Outcome Measures       03/12/17 1020 03/11/17 1120 03/10/17 0940    How much help from another person do you currently need...    Turning from your back to your side while in flat bed without using bedrails? 3  -KM  2  -UD    Moving from lying on back to sitting on the side of a flat bed without bedrails? 3  -KM  2  -UD    Moving to and from a bed to a chair (including a wheelchair)? 3  -KM  2  -UD    Standing up from a chair using your arms (e.g., wheelchair, bedside chair)? 3  -KM  2  -UD    Climbing 3-5 steps with a railing? 2  -KM  1  -UD    To walk in hospital room? 3  -KM  2  -UD    AM-PAC 6 Clicks Score 17  -KM  11  -UD    How much help from another is currently needed...    Putting on and taking off regular lower body clothing?  2  -TB     Bathing (including washing, rinsing, and drying)  2  -TB     Toileting (which includes using toilet bed pan or urinal)  2  -TB     Putting on and taking off regular upper body clothing  2  -TB     Taking care of personal grooming (such as brushing teeth)  2  -TB     Eating meals  2  -TB     Score  12  -TB     Functional Assessment    Outcome Measure Options AM-PAC 6 Clicks Basic Mobility (PT)  -KM AM-PAC 6 Clicks Daily Activity (OT)  -TB       03/09/17 1330          How much help from another person do you currently need...    Turning from your back to your side while in flat bed without using bedrails? 2  -UD      Moving from lying on back to sitting on the side of a flat bed without bedrails? 2  -UD      Moving to and from a bed to a chair (including a wheelchair)? 2  -UD      Standing up from a chair using your arms (e.g., wheelchair, bedside chair)? 2  -UD      Climbing 3-5 steps with a railing? 1  -UD      To walk in hospital  room? 2  -UD      AM-PAC 6 Clicks Score 11  -UD        User Key  (r) = Recorded By, (t) = Taken By, (c) = Cosigned By    Initials Name Provider Type    TB Raquel Fung, OT Occupational Therapist    UD Sofía Cade, PTA Physical Therapy Assistant    JACOBY Beaver, PT Physical Therapist           Time Calculation:         PT Charges       03/12/17 1100          Time Calculation    Start Time 1020  -KM      PT Received On 03/12/17  -KM      PT Goal Re-Cert Due Date 03/17/17  -KM      Time Calculation- PT    Total Timed Code Minutes- PT 23 minute(s)  -KM        User Key  (r) = Recorded By, (t) = Taken By, (c) = Cosigned By    Initials Name Provider Type    JACOBY Beaver, DILAN Physical Therapist          Therapy Charges for Today     Code Description Service Date Service Provider Modifiers Qty    94558790214 HC GAIT TRAINING EA 15 MIN 3/12/2017 Rosaura Beaver, PT GP 1    68513528443 HC PT THER PROC EA 15 MIN 3/12/2017 Rosaura Beaver, PT GP 1          PT G-Codes  Outcome Measure Options: AM-PAC 6 Clicks Basic Mobility (PT)    Rosaura Beaver, PT  3/12/2017

## 2017-03-12 NOTE — PROGRESS NOTES
[facility] Medicine Services  INPATIENT PROGRESS NOTE    Date of Admission: 3/6/2017  Length of Stay: 0  Primary Care Physician: No Known Provider    Subjective   CC: hospital follow up    HPI:  Pt is sitting in chair eating lunch. She states she feels well. Nursing states she has been voiding more with less retention. She had diarrhea overnight and this am will check C Diff. She deneis any cp, soa, fever, chills, or n/v.    Review Of Systems:   Review of Systems   Constitutional: Positive for appetite change. Negative for chills, fatigue and fever.   HENT: Negative for trouble swallowing.    Respiratory: Negative for cough, choking and shortness of breath.    Cardiovascular: Negative for chest pain.   Gastrointestinal: Positive for diarrhea. Negative for constipation, nausea and vomiting.   Genitourinary: Negative for dysuria.   Psychiatric/Behavioral: Positive for confusion. Negative for agitation and sleep disturbance.        Dementia         Objective      Temp:  [98.7 °F (37.1 °C)-98.9 °F (37.2 °C)] 98.7 °F (37.1 °C)  Heart Rate:  [69-95] 69  Resp:  [16-18] 17  BP: (101-147)/(52-85) 144/52  Physical Exam   Constitutional: She appears well-developed and well-nourished. No distress.   Sitting up in chair.     HENT:   Head: Normocephalic.   Eyes: Pupils are equal, round, and reactive to light. No scleral icterus.   Neck: Normal range of motion. Neck supple.   Cardiovascular: Normal rate, regular rhythm, normal heart sounds and intact distal pulses.  Exam reveals no gallop and no friction rub.    No murmur heard.  Pulmonary/Chest: Effort normal and breath sounds normal. No stridor. No respiratory distress. She has no wheezes.   Abdominal: Soft. Bowel sounds are normal. She exhibits no distension.   Musculoskeletal: Normal range of motion. She exhibits no edema.   Neurological: She is alert.   Pt is alert and orientated to place, person, and situation, she answered all questions  appropriately. She can be confused at times    Skin: Skin is warm and dry. There is pallor.   Psychiatric: She has a normal mood and affect. Her behavior is normal.   Vitals reviewed.        Results Review:    I have reviewed the labs, radiology results and diagnostic studies.      Results from last 7 days  Lab Units 03/10/17  0710   WBC 10*3/mm3 8.55   HEMOGLOBIN g/dL 11.2*   PLATELETS 10*3/mm3 221       Results from last 7 days  Lab Units 03/10/17  0710   SODIUM mmol/L 136   POTASSIUM mmol/L 3.6   CHLORIDE mmol/L 103   TOTAL CO2 mmol/L 29.0   BUN mg/dL 16   CREATININE mg/dL 0.70   GLUCOSE mg/dL 117*   CALCIUM mg/dL 10.0       Culture Data: Cultures:    BLOOD CULTURE   Date Value Ref Range Status   03/05/2017 No growth at 3 days  Preliminary   03/05/2017 No growth at 3 days  Preliminary       Radiology Data: no new    I have reviewed the medications.    Assessment/Plan     Problem List  Principal Problem:    Altered mental status, unspecified  Active Problems:    Hypertension    Carotid stenosis    Cerebrovascular accident (CVA)    Hypertensive emergency    Urinary retention    UTI (urinary tract infection)        Assessment/Plan:  UTI  -- rocephin complete  --urology following  -- urine culture growth normal ramon  -- bladder scan prn for retention   AMS  --BP remains stable  -- follow up with neuro after dc  Diarrhea   -- stool softeners dc'd   -- c diff negative on 3/7/17 recheck ordered for today     DVT prophylaxis: TEDS/SCDS  Discharge Planning: I expect patient to be discharged to NH on Tuesday for long-term care     Mercy Pina, APRN   03/12/17   1:23 PM    Please note that portions of this note may have been completed with a voice recognition program. Efforts were made to edit the dictations, but occasionally words are mistranscribed.

## 2017-03-12 NOTE — PLAN OF CARE
Problem: Inpatient Physical Therapy  Goal: Bed Mobility Goal LTG- PT  Outcome: Ongoing (interventions implemented as appropriate)    03/12/17 1058   Bed Mobility PT LTG   Bed Mobility PT LTG, Outcome goal ongoing       Goal: Transfer Training Goal 1 LTG- PT  Outcome: Ongoing (interventions implemented as appropriate)    03/12/17 1058   Transfer Training PT LTG   Transfer Training PT LTG, Outcome goal ongoing       Goal: Gait Training Goal LTG- PT  Outcome: Ongoing (interventions implemented as appropriate)    03/12/17 1058   Gait Training PT LTG   Gait Training Goal PT LTG, Outcome goal ongoing         Problem: Patient Care Overview (Adult)  Goal: Plan of Care Review  Outcome: Ongoing (interventions implemented as appropriate)    03/12/17 1058   Outcome Evaluation   Outcome Summary/Follow up Plan Pt able to progress gait distance with less assist, making gradual progress.

## 2017-03-13 PROCEDURE — 25010000002 LORAZEPAM PER 2 MG: Performed by: INTERNAL MEDICINE

## 2017-03-13 PROCEDURE — 96376 TX/PRO/DX INJ SAME DRUG ADON: CPT

## 2017-03-13 PROCEDURE — 99225 PR SBSQ OBSERVATION CARE/DAY 25 MINUTES: CPT | Performed by: INTERNAL MEDICINE

## 2017-03-13 PROCEDURE — G0378 HOSPITAL OBSERVATION PER HR: HCPCS

## 2017-03-13 RX ADMIN — ATENOLOL 50 MG: 50 TABLET ORAL at 21:10

## 2017-03-13 RX ADMIN — Medication 400 MG: at 08:22

## 2017-03-13 RX ADMIN — FLUOXETINE HYDROCHLORIDE 20 MG: 20 CAPSULE ORAL at 08:22

## 2017-03-13 RX ADMIN — Medication 250 MG: at 08:22

## 2017-03-13 RX ADMIN — PANTOPRAZOLE SODIUM 40 MG: 40 TABLET, DELAYED RELEASE ORAL at 08:22

## 2017-03-13 RX ADMIN — Medication 250 MG: at 17:18

## 2017-03-13 RX ADMIN — ASPIRIN 325 MG ORAL TABLET 325 MG: 325 PILL ORAL at 08:22

## 2017-03-13 RX ADMIN — METHYLPHENIDATE HYDROCHLORIDE 5 MG: 5 TABLET ORAL at 08:22

## 2017-03-13 RX ADMIN — DILTIAZEM HYDROCHLORIDE 240 MG: 240 CAPSULE, COATED, EXTENDED RELEASE ORAL at 21:10

## 2017-03-13 RX ADMIN — CLOPIDOGREL BISULFATE 75 MG: 75 TABLET, FILM COATED ORAL at 08:22

## 2017-03-13 RX ADMIN — LORAZEPAM 0.5 MG: 2 INJECTION INTRAMUSCULAR; INTRAVENOUS at 11:41

## 2017-03-13 RX ADMIN — Medication 400 MG: at 17:18

## 2017-03-13 RX ADMIN — RIVASTIGMINE TRANSDERMAL SYSTEM 1 PATCH: 4.6 PATCH, EXTENDED RELEASE TRANSDERMAL at 08:22

## 2017-03-13 RX ADMIN — BETHANECHOL CHLORIDE 10 MG: 10 TABLET ORAL at 16:54

## 2017-03-13 RX ADMIN — ATORVASTATIN CALCIUM 80 MG: 40 TABLET, FILM COATED ORAL at 21:10

## 2017-03-13 RX ADMIN — BETHANECHOL CHLORIDE 10 MG: 10 TABLET ORAL at 08:22

## 2017-03-13 RX ADMIN — BETHANECHOL CHLORIDE 10 MG: 10 TABLET ORAL at 21:13

## 2017-03-13 RX ADMIN — DOXAZOSIN MESYLATE 2 MG: 1 TABLET ORAL at 21:10

## 2017-03-13 RX ADMIN — ATENOLOL 50 MG: 50 TABLET ORAL at 08:22

## 2017-03-13 NOTE — PROGRESS NOTES
[facility] Medicine Services  INPATIENT PROGRESS NOTE    Date of Admission: 3/6/2017  Length of Stay: 0  Primary Care Physician: No Known Provider    Subjective   CC: hospital follow up    HPI:    Denies loose stool or abdominal pain. Strength improved.    Review Of Systems:   Review of Systems   Unable to perform ROS: Dementia         Objective      Temp:  [98.4 °F (36.9 °C)-98.7 °F (37.1 °C)] 98.7 °F (37.1 °C)  Heart Rate:  [70-84] 70  Resp:  [16-18] 16  BP: (121-159)/(48-65) 121/48  Physical Exam  Alert, lying in bed  Speech clear, face symmetric, mild confusion but will answer some questions  RRR, no murmur rub or gallop  CTAB, no wheezes or rhonchi  Abdomen soft, non tender, non distended, normal bowel sounds, no rebound or guarding  No lower extremity cyanosis, clubbing or edema  Normal affect  Moves all extremities but must coax her to wiggle toes      Results Review:    I have reviewed the labs, radiology results and diagnostic studies.      Results from last 7 days  Lab Units 03/10/17  0710   WBC 10*3/mm3 8.55   HEMOGLOBIN g/dL 11.2*   PLATELETS 10*3/mm3 221       Results from last 7 days  Lab Units 03/10/17  0710   SODIUM mmol/L 136   POTASSIUM mmol/L 3.6   CHLORIDE mmol/L 103   TOTAL CO2 mmol/L 29.0   BUN mg/dL 16   CREATININE mg/dL 0.70   GLUCOSE mg/dL 117*   CALCIUM mg/dL 10.0       Culture Data: Cultures:    BLOOD CULTURE   Date Value Ref Range Status   03/05/2017 No growth at 3 days  Preliminary   03/05/2017 No growth at 3 days  Preliminary       Radiology Data: no new    I have reviewed the medications.    aspirin 325 mg Oral Daily   Or      aspirin 300 mg Rectal Daily   atenolol 50 mg Oral Q12H   atorvastatin 80 mg Oral Nightly   bethanechol 10 mg Oral TID   clopidogrel 75 mg Oral Daily   diltiaZEM  mg Oral Nightly   doxazosin 2 mg Oral Nightly   FLUoxetine 20 mg Oral Daily   magnesium oxide 400 mg Oral BID   methylphenidate 5 mg Oral Daily   pantoprazole 40 mg Oral  Daily   rivastigmine 1 patch Transdermal Daily   saccharomyces boulardii 250 mg Oral BID       Assessment/Plan     Problem List  Principal Problem:    Altered mental status, unspecified  Active Problems:    Hypertension    Carotid stenosis    Cerebrovascular accident (CVA)    Hypertensive emergency    Urinary retention    UTI (urinary tract infection)           Assessment/Plan:  --Day #3 of rocephin, dc after 3rd dose unless culture abnormal  --urology following  --follow urine culture  --follow up with neuro after dc  --add stool softener  --BP remains stable  --transfer Tuesday    DVT prophylaxis: TEDS/SCDS  Discharge Planning: I expect patient to be discharged to NH on Tuesday for long-term care     Robbin Phillips MD   03/13/17   5:07 PM    Please note that portions of this note may have been completed with a voice recognition program. Efforts were made to edit the dictations, but occasionally words are mistranscribed.

## 2017-03-13 NOTE — PROGRESS NOTES
Continued Stay Note  [facility]     Patient Name: Loni Walker  MRN: [id number]  Today's Date: 3/13/2017    Admit Date: 3/6/2017          Discharge Plan       03/13/17 1539    Case Management/Social Work Plan    Additional Comments Spoke with Kat at [facility] they are still planning on pt's transfer tomorrow. Spoke with daughter Maribel and she is on her way to do paper work at SNF at this time and remains agreeable to plan. Pt has had an ambulance arranged through [facility] at 1300 03/14/17.               Discharge Codes     None        Expected Discharge Date and Time     Expected Discharge Date Expected Discharge Time    Mar 13, 2017             Aydee Dave

## 2017-03-13 NOTE — PROGRESS NOTES
"Adult Nutrition  Assessment/PES    Patient Name:  Loni Walker  YOB: 1940  MRN: [id number]  Admit Date:  3/6/2017    Assessment Date:  3/13/2017        Reason for Assessment       03/13/17 1121    Reason for Assessment    Reason For Assessment/Visit length of stay    Time Spent (min) 15    Diagnosis Diagnosis    Cardiac CAD;HTN    Gastrointestinal Constipation;Diarrhea    Infectious Disease UTI    Neurological CVA;AMS   History of CVA    Renal Other (comment)   urinary retention              Nutrition/Diet History       03/13/17 1126    Nutrition/Diet History    Reported/Observed By Patient    Other Patient reported she has not eaten much the past few meals - only bites of meals yesterday and breakfast this AM. Observed patient had only eaten bites of breakfast tray. Patient reported \"just not feeling well.\" Willing to try boost.            Anthropometrics       03/13/17 1130    Anthropometrics    Height 144.8 cm (57.01\")    Weight 59 kg (130 lb 1.1 oz)    Ideal Body Weight (IBW)    Ideal Body Weight (IBW), Female 39.42    % Ideal Body Weight 149.96    Body Mass Index (BMI)    BMI (kg/m2) 28.2            Labs/Tests/Procedures/Meds       03/13/17 1130    Labs/Tests/Procedures/Meds    Labs/Tests Review Reviewed                Nutrition Prescription Ordered       03/13/17 1130    Nutrition Prescription PO    Current PO Diet Regular    Common Modifiers Cardiac            Evaluation of Received Nutrient/Fluid Intake       03/13/17 1131    PO Evaluation    Number of Meals 4    % PO Intake 44%              Problem/Interventions:        Problem 1       03/13/17 1131    Nutrition Diagnoses Problem 1    Problem 1 Inadequate Intake/Infusion    Inadequate Intake Type Oral    Etiology (related to) Medical Diagnosis   clinical condition/poor appetite    Signs/Symptoms (evidenced by) PO Intake    Percent (%) intake recorded 44 %    Over number of meals 4                    Intervention Goal       03/13/17 1131    " Intervention Goal    General Nutrition support treatment    PO Increase intake            Nutrition Intervention       03/13/17 1131    Nutrition Intervention    RD/Tech Action Advise alternate selection;Encourage intake;Recommend/ordered;Supplement provided;Follow Tx progress;Care plan reviewd    Recommended/Ordered Supplement            Nutrition Prescription       03/13/17 1131    Nutrition Prescription PO    PO Prescription Begin/change supplement    Supplement Boost Glucose Control    Supplement Frequency 2 times a day    New PO Prescription Ordered? Yes            Education/Evaluation       03/13/17 1137    Monitor/Evaluation    Monitor Per protocol;PO intake;Supplement intake        Electronically signed by:  Brianda Menezes  03/13/17 11:37 AM

## 2017-03-13 NOTE — PLAN OF CARE
Problem: Skin Integrity Impairment, Risk/Actual (Adult)  Goal: Identify Related Risk Factors and Signs and Symptoms  Outcome: Ongoing (interventions implemented as appropriate)    Problem: Pressure Ulcer Risk (Mak Scale) (Adult,Obstetrics,Pediatric)  Goal: Identify Related Risk Factors and Signs and Symptoms  Outcome: Ongoing (interventions implemented as appropriate)    Problem: Patient Care Overview (Adult)  Goal: Plan of Care Review  Outcome: Ongoing (interventions implemented as appropriate)    Problem: Fall Risk (Adult)  Goal: Identify Related Risk Factors and Signs and Symptoms  Outcome: Ongoing (interventions implemented as appropriate)    Problem: Urine Elimination, Impaired (Adult)  Goal: Identify Related Risk Factors and Signs and Symptoms  Outcome: Ongoing (interventions implemented as appropriate)

## 2017-03-13 NOTE — PROGRESS NOTES
"Daily Progress Note    Patient: [facility] Day: 8    Subjective: She is now voiding well.  She also is having some diarrhea.      Objective:     Visit Vitals   • /65 (BP Location: Left arm, Patient Position: Sitting)   • Pulse 84   • Temp 98.4 °F (36.9 °C) (Oral)   • Resp 17   • Ht 57.01\" (144.8 cm)   • Wt 130 lb (59 kg)   • LMP  (LMP Unknown)   • SpO2 95%   • BMI 28.12 kg/m2         Intake/Output Summary (Last 24 hours) at 03/13/17 0733  Last data filed at 03/13/17 0521   Gross per 24 hour   Intake    120 ml   Output      0 ml   Net    120 ml           Physical Exam:   General Appearance: alert, appears stated age and cooperative.  Oriented ×4 and appropriate.  Head: normocephalic, without obvious abnormality and atraumatic  Lungs respirations regular  Abdomen no masses and soft non-tender  Extremities moves extremities well, no edema, no cyanosis and no redness          Assessment/Plan: Micturition has improved greatly now that her mental status has returned to baseline.  Discharge plans for tomorrow are noted.  Will sign off.  Please call if we can be of further assistance.    Patient Active Problem List   Diagnosis   • Hypertension   • CVA (cerebral vascular accident)   • Carotid stenosis   • Constipation   • Cerebrovascular accident (CVA)   • Altered mental status, unspecified   • Hypertensive emergency   • Urinary retention   • UTI (urinary tract infection)             Jed Simmons MD - 3/13/2017, 7:33 AM      "

## 2017-03-14 VITALS
HEART RATE: 78 BPM | WEIGHT: 130.07 LBS | HEIGHT: 57 IN | BODY MASS INDEX: 28.06 KG/M2 | RESPIRATION RATE: 16 BRPM | SYSTOLIC BLOOD PRESSURE: 142 MMHG | TEMPERATURE: 98.1 F | OXYGEN SATURATION: 97 % | DIASTOLIC BLOOD PRESSURE: 55 MMHG

## 2017-03-14 PROCEDURE — 99217 PR OBSERVATION CARE DISCHARGE MANAGEMENT: CPT | Performed by: NURSE PRACTITIONER

## 2017-03-14 PROCEDURE — G0378 HOSPITAL OBSERVATION PER HR: HCPCS

## 2017-03-14 RX ORDER — SACCHAROMYCES BOULARDII 250 MG
250 CAPSULE ORAL 2 TIMES DAILY
Status: ON HOLD
Start: 2017-03-14 | End: 2020-04-04

## 2017-03-14 RX ORDER — FLUOXETINE HYDROCHLORIDE 20 MG/1
20 CAPSULE ORAL DAILY
Status: ON HOLD
Start: 2017-03-14 | End: 2020-04-04

## 2017-03-14 RX ORDER — DILTIAZEM HYDROCHLORIDE 240 MG/1
240 CAPSULE, COATED, EXTENDED RELEASE ORAL NIGHTLY
Start: 2017-03-14 | End: 2020-04-08 | Stop reason: HOSPADM

## 2017-03-14 RX ORDER — RIVASTIGMINE 4.6 MG/24H
1 PATCH, EXTENDED RELEASE TRANSDERMAL DAILY
Start: 2017-03-14

## 2017-03-14 RX ORDER — ATORVASTATIN CALCIUM 80 MG/1
80 TABLET, FILM COATED ORAL NIGHTLY
Status: ON HOLD
Start: 2017-03-14 | End: 2020-04-04

## 2017-03-14 RX ORDER — BETHANECHOL CHLORIDE 10 MG/1
10 TABLET ORAL 3 TIMES DAILY
Status: ON HOLD
Start: 2017-03-14 | End: 2020-04-04

## 2017-03-14 RX ORDER — METHYLPHENIDATE HYDROCHLORIDE 5 MG/1
5 TABLET ORAL DAILY
Qty: 3 TABLET | Refills: 0 | Status: ON HOLD | OUTPATIENT
Start: 2017-03-14 | End: 2020-04-04

## 2017-03-14 RX ORDER — ALPRAZOLAM 0.5 MG/1
0.25 TABLET ORAL 2 TIMES DAILY PRN
Qty: 3 TABLET | Refills: 0 | Status: SHIPPED | OUTPATIENT
Start: 2017-03-14

## 2017-03-14 RX ADMIN — RIVASTIGMINE TRANSDERMAL SYSTEM 1 PATCH: 4.6 PATCH, EXTENDED RELEASE TRANSDERMAL at 09:08

## 2017-03-14 RX ADMIN — Medication 250 MG: at 09:07

## 2017-03-14 RX ADMIN — METHYLPHENIDATE HYDROCHLORIDE 5 MG: 5 TABLET ORAL at 09:07

## 2017-03-14 RX ADMIN — Medication 400 MG: at 09:07

## 2017-03-14 RX ADMIN — ATENOLOL 50 MG: 50 TABLET ORAL at 09:07

## 2017-03-14 RX ADMIN — BETHANECHOL CHLORIDE 10 MG: 10 TABLET ORAL at 09:07

## 2017-03-14 RX ADMIN — FLUOXETINE HYDROCHLORIDE 20 MG: 20 CAPSULE ORAL at 09:08

## 2017-03-14 RX ADMIN — ACETAMINOPHEN 650 MG: 325 TABLET, FILM COATED ORAL at 11:47

## 2017-03-14 RX ADMIN — CLOPIDOGREL BISULFATE 75 MG: 75 TABLET, FILM COATED ORAL at 09:07

## 2017-03-14 RX ADMIN — PANTOPRAZOLE SODIUM 40 MG: 40 TABLET, DELAYED RELEASE ORAL at 09:07

## 2017-03-14 RX ADMIN — ASPIRIN 325 MG ORAL TABLET 325 MG: 325 PILL ORAL at 09:07

## 2017-03-14 NOTE — DISCHARGE SUMMARY
[facility] Medicine Services  DISCHARGE SUMMARY       Date of Admission: 3/6/2017  Date of Discharge:  3/14/2017  Primary Care Physician: No Known Provider    Discharge Diagnoses:  Active Hospital Problems (** Indicates Principal Problem)    Diagnosis Date Noted   • **Altered mental status, unspecified [R41.82] 03/06/2017   • Urinary retention [R33.9] 03/09/2017   • UTI (urinary tract infection) [N39.0] 03/09/2017   • Hypertensive emergency [I16.1] 03/07/2017   • Carotid stenosis [I65.29] 10/29/2016   • Cerebrovascular accident (CVA) [I63.9] 10/29/2016   • Hypertension [I10] 10/29/2016      Resolved Hospital Problems    Diagnosis Date Noted Date Resolved   No resolved problems to display.       Presenting Problem/History of Present Illness  Altered mental status, unspecified [R41.82]  Hypertensive emergency [I16.1]  Altered mental status, unspecified [R41.82]     Chief Complaint on Day of Discharge:   Hospital follow up    History of Present Illness on Day of Discharge:   Seems more alert and oriented than on previous evals  Voiding without difficulty  Tolerating po    Hospital Course  Patient is a 77 y.o. female presented with altered mental status and increased weakness for 2 days.  She was living with her daughter at time of admission, but had recently been discharged from [facility] approximately 2 weeks prior.  At that time, she was walking 600 feet per day, oriented and performing her own ADLs according to the daughter.  Patient was admitted to the hospitalist service for further evaluation and treatment, neurology workup was initiated.  It was noted the patient's blood pressure was elevated in the ED with a systolic in the 200s.  The etiology of her altered mental status was unclear but felt to be multifactorial related to possible hypertensive encephalopathy versus possible progression of underlying dementia.  Imaging was negative for acute stroke.  During this  hospitalization she began having issues with urinary retention, requiring in and out catheter.  She apparently has had problems with this in the past but has not seen urology in over 2 years.  Dr. Simmons with urology saw the patient during this hospital stay felt this was likely a chronic ongoing problem.  She received 3 days of Rocephin for possible UTI and was started on Urecholine.  She is now voiding without difficulty.  She has continued to show improvement and is felt to be stable and ready for discharge to [facility] for long-term care.  Her blood pressure has remained stable for the last several days.  Of important note, was recommended by neurology to try low-dose Seroquel at bedtime if needed for agitation.  Exelon patch was initiated during this hospital stay and will be continued.  She is to follow-up with Dr. Topete on an outpatient basis, first available appointment, for possible underlying dementia and recheck of her neuro status.      Procedures Performed  None    Consults:   Consults     Date and Time Order Name Status Description    3/8/2017 1427 Inpatient Consult to Urology Completed     3/6/2017 1756 Inpatient Consult to Neurology Completed           Pertinent Test Results:  Cultures:    URINE CULTURE   Date Value Ref Range Status   03/10/2017 >100,000 CFU/mL Normal Urogenital Ngoc  Final     Component      Latest Ref Rng 3/6/2017 3/8/2017 3/10/2017   WBC      3.50 - 10.80 10*3/mm3 5.86 11.19 (H) 8.55   RBC      3.89 - 5.14 10*6/mm3 4.01 3.93 3.84 (L)   Hemoglobin      11.5 - 15.5 g/dL 11.8 11.7 11.2 (L)   Hematocrit      34.5 - 44.0 % 36.6 36.4 36.0   MCV      80.0 - 99.0 fL 91.3 92.6 93.8   MCH      27.0 - 31.0 pg 29.4 29.8 29.2   MCHC      32.0 - 36.0 g/dL 32.2 32.1 31.1 (L)   RDW      11.3 - 14.5 % 13.8 14.5 14.3   RDW-SD      37.0 - 54.0 fl 45.8 49.8 49.1   MPV      6.0 - 12.0 fL 11.5 11.4 11.7   Platelets      150 - 450 10*3/mm3 205 239 221   Neutrophil %      41.0 - 71.0 % 58.6 77.3 (H) 62.8    Lymphocyte %      24.0 - 44.0 % 30.9 14.7 (L) 24.0   Monocyte %      0.0 - 12.0 % 7.8 6.8 9.8   Eosinophil %      0.0 - 3.0 % 2.2 0.6 2.7   Basophil %      0.0 - 1.0 % 0.3 0.3 0.6   Immature Grans %      0.0 - 0.6 % 0.2 0.3 0.1   Neutrophils, Absolute      1.50 - 8.30 10*3/mm3 3.43 8.65 (H) 5.37   Lymphocytes, Absolute      0.60 - 4.80 10*3/mm3 1.81 1.65 2.05   Monocytes, Absolute      0.00 - 1.00 10*3/mm3 0.46 0.76 0.84   Eosinophils, Absolute      0.10 - 0.30 10*3/mm3 0.13 0.07 (L) 0.23   Basophils, Absolute      0.00 - 0.20 10*3/mm3 0.02 0.03 0.05   Immature Grans, Absolute      0.00 - 0.03 10*3/mm3 0.01 0.03 0.01   Glucose      70 - 100 mg/dL 100 148 (H) 117 (H)   BUN      9 - 23 mg/dL 16 13 16   Creatinine      0.60 - 1.30 mg/dL 0.70 0.70 0.70   Sodium      132 - 146 mmol/L 139 136 136   Potassium      3.5 - 5.5 mmol/L 3.8 3.8 3.6   Chloride      99 - 109 mmol/L 104 104 103   CO2      20.0 - 31.0 mmol/L 28.0 25.0 29.0   Calcium      8.7 - 10.4 mg/dL 10.9 (H) 10.4 10.0   Total Protein      5.7 - 8.2 g/dL 7.0     Albumin      3.20 - 4.80 g/dL 4.40     ALT (SGPT)      7 - 40 U/L 39     AST (SGOT)      0 - 33 U/L 29     Alkaline Phosphatase      25 - 100 U/L 76     Total Bilirubin      0.3 - 1.2 mg/dL 0.2 (L)     eGFR Non African Amer      >60 mL/min/1.73 81 81 81   Globulin      gm/dL 2.6     A/G Ratio      1.5 - 2.5 g/dL 1.7     BUN/Creatinine Ratio      7.0 - 25.0 22.9 18.6 22.9   Anion Gap      3.0 - 11.0 mmol/L 7.0 7.0 4.0     Component      Latest Ref Rng 3/6/2017 3/7/2017 3/8/2017   Total Cholesterol      0 - 200 mg/dL  164    Triglycerides      0 - 150 mg/dL  150    HDL Cholesterol      40 - 60 mg/dL  40    LDL Cholesterol       0 - 130 mg/dL  90    Hemoglobin A1C      4.80 - 5.60 % 6.30 (H)     TSH Baseline      0.350 - 5.350 mIU/mL 2.015     RPR      Non-Reactive Non-Reactive     Folate      3.20 - 20.00 ng/mL 14.91     Vitamin B-12      211 - 911 pg/mL 585     Magnesium      1.3 - 2.7 mg/dL   2.0  "    Component      Latest Ref Rng 3/6/2017 3/8/2017   Color, UA      Yellow, Straw Yellow Yellow   Appearance, UA      Clear Clear Cloudy (A)   pH, UA      5.0 - 8.0 6.0 7.0   Specific [place], UA      1.001 - 1.030 >=1.030 1.009   Glucose, UA      Negative Negative Negative   Ketones, UA      Negative Negative Negative   Bilirubin, UA      Negative Negative Negative   Blood, UA      Negative Negative Negative   Protein, UA      Negative 30 mg/dL (1+) (A) Negative   Leuk Esterase, UA      Negative Negative Small (1+) (A)   Nitrite, UA      Negative Negative Positive (A)   Urobilinogen, UA      0.2 - 1.0 E.U./dL 0.2 E.U./dL 0.2 E.U./dL   RBC, UA      None Seen, 0-2 /HPF 0-2 0-2   WBC, UA      None Seen /HPF 0-2 (A) 3-5 (A)   Bacteria, UA      None Seen, Trace /HPF None Seen 4+ (A)   Squamous Epithelial Cells, UA      None Seen, 0-2 /HPF 0-2 None Seen   Hyaline Casts, UA      0 - 6 /LPF 0-6 None Seen   Methodology:       Automated Microscopy Manual Light Microscopy     Condition on Discharge:  stable    Physical Exam on Discharge:  Visit Vitals   • BP (P) 144/56 (BP Location: Right arm, Patient Position: Lying)   • Pulse (P) 68   • Temp (P) 98.5 °F (36.9 °C) (Oral)   • Resp (P) 20   • Ht 57.01\" (144.8 cm)   • Wt 130 lb 1.1 oz (59 kg)   • LMP  (LMP Unknown)   • SpO2 95%   • BMI 28.14 kg/m2     Physical Exam   Constitutional: She appears well-developed and well-nourished. No distress.   HENT:   Head: Normocephalic.   Eyes: No scleral icterus.   Neck: Neck supple.   Cardiovascular: Normal rate and regular rhythm.  Exam reveals no gallop and no friction rub.    No murmur heard.  Pulmonary/Chest: Effort normal and breath sounds normal. No respiratory distress. She has no wheezes.   Abdominal: Soft. Bowel sounds are normal. She exhibits no distension. There is no guarding.   Musculoskeletal: She exhibits no edema.   Skin: Skin is warm and dry. No erythema.   Psychiatric: She has a normal mood and affect. Her behavior is " normal.   Vitals reviewed.        Discharge Disposition  Skilled Nursing Facility (DC - External)    Discharge Medications   Loni Walker   Home Medication Instructions GRANT:[id number]    Printed on:03/14/17 7914   Medication Information                      ALPRAZolam (XANAX) 0.5 MG tablet  Take 0.5 tablets by mouth 2 (Two) Times a Day As Needed for Anxiety.             atenolol (TENORMIN) 50 MG tablet  Take 50 mg by mouth 2 (Two) Times a Day.             atorvastatin (LIPITOR) 80 MG tablet  Take 1 tablet by mouth Every Night.             bethanechol (URECHOLINE) 10 MG tablet  Take 1 tablet by mouth 3 (Three) Times a Day.             clopidogrel (PLAVIX) 75 MG tablet  Take 75 mg by mouth Daily.             diltiaZEM CD (CARDIZEM CD) 240 MG 24 hr capsule  Take 1 capsule by mouth Every Night.             doxazosin (CARDURA) 2 MG tablet  Take 2 mg by mouth Every Night.             ferrous sulfate 325 (65 FE) MG tablet  Take 325 mg by mouth 2 (Two) Times a Day.             FLUoxetine (PROzac) 20 MG capsule  Take 1 capsule by mouth Daily.             fluticasone (FLONASE) 50 MCG/ACT nasal spray  2 sprays into each nostril Daily.             magnesium oxide (MAGOX) 400 (241.3 MG) MG tablet tablet  Take 400 mg by mouth 2 (Two) Times a Day.             methylphenidate (RITALIN) 5 MG tablet  Take 1 tablet by mouth Daily.             Multiple Vitamins-Minerals (MULTIVITAMIN PO)  Take 1 tablet by mouth Daily.             pantoprazole (PROTONIX) 40 MG EC tablet  Take 40 mg by mouth Daily.             rivastigmine (EXELON) 4.6 MG/24HR patch  Place 1 patch on the skin Daily.             saccharomyces boulardii (FLORASTOR) 250 MG capsule  Take 1 capsule by mouth 2 (Two) Times a Day.             vitamin C (ASCORBIC ACID) 250 MG tablet  Take 250 mg by mouth Daily.                 Discharge Diet:   Diet Instructions     Diet: Regular, Cardiac; Thin Liquids, No Restrictions       Discharge Diet:   Regular  Cardiac      Fluid  Consistency:  Thin Liquids, No Restrictions                 Discharge Care Plan / Instructions:    Activity at Discharge:   Activity Instructions     Activity as Tolerated                     Follow-up Appointments  No future appointments.  Additional Instructions for the Follow-ups that You Need to Schedule     Discharge Follow-Up With Specified Provider    As directed    To:  Dr. Topete   Follow Up Details:  1st available       Discharge Follow-up with PCP    As directed    Follow Up Details:  at facility                    Zenia VillafanaGABRILELE 03/14/17 8:16 AM    Time: Discharge 40 min    Please note that portions of this note may have been completed with a voice recognition program. Efforts were made to edit the dictations, but occasionally words are mistranscribed.

## 2017-03-14 NOTE — DISCHARGE PLACEMENT REQUEST
"Jimbo Walker (77 y.o. Female)     Date of Birth Social Security Number Address Home Phone MRN    1940  [address] [address] [phone] [id number]    Confucianist Marital Status          None        Admission Date Admission Type Admitting Provider Attending Provider Department, Room/Bed    3/6/17 Emergency Robbin Phillips MD Sloan, Walker E, MD [facility] 4H, S483/1    Discharge Date Discharge Disposition Discharge Destination         Skilled Nursing Facility (DC - External)             Attending Provider: Robbin Phillips MD     Allergies:  Hydrocodone, Oxycodone, Penicillins, Sulfa Antibiotics, Doxycycline, Lyrica [Pregabalin]    Isolation:  None   Infection:  None   Code Status:  FULL    Ht:  57.01\" (144.8 cm)   Wt:  130 lb 1.1 oz (59 kg)    Admission Cmt:  None   Principal Problem:  Altered mental status, unspecified [R41.82]                 Active Insurance as of 3/6/2017     Primary Coverage     Payor Plan Insurance Group Employer/Plan Group    MEDICARE MEDICARE A & B      Payor Plan Address Payor Plan Phone Number Effective From Effective To    [address] [phone] 1/1/2004     [place] [address]       Subscriber Name Subscriber Birth Date Member ID       JIMBO WALKER 1940 [id number]           Secondary Coverage     Payor Plan Insurance Group Employer/Plan Group    AAR MED SUPP AAR HEALTH CARE OPTIONS      Payor Plan Address Payor Plan Phone Number Effective From Effective To    [facility] [phone] 1/1/2016     [address]       [place] [address]       Subscriber Name Subscriber Birth Date Member ID       JIMBO WALKER 1940 [id number]                 Emergency Contacts      (Rel.) Home Phone Work Phone Mobile Phone    Maribel López (Daughter) -- -- [phone]               Discharge Summary      GABRIELLE Ayoub at 3/14/2017  7:45 AM              [facility] Medicine " Services  DISCHARGE SUMMARY       Date of Admission: 3/6/2017  Date of Discharge:  3/14/2017  Primary Care Physician: No Known Provider    Discharge Diagnoses:  Active Hospital Problems (** Indicates Principal Problem)    Diagnosis Date Noted   • **Altered mental status, unspecified [R41.82] 03/06/2017   • Urinary retention [R33.9] 03/09/2017   • UTI (urinary tract infection) [N39.0] 03/09/2017   • Hypertensive emergency [I16.1] 03/07/2017   • Carotid stenosis [I65.29] 10/29/2016   • Cerebrovascular accident (CVA) [I63.9] 10/29/2016   • Hypertension [I10] 10/29/2016      Resolved Hospital Problems    Diagnosis Date Noted Date Resolved   No resolved problems to display.       Presenting Problem/History of Present Illness  Altered mental status, unspecified [R41.82]  Hypertensive emergency [I16.1]  Altered mental status, unspecified [R41.82]     Chief Complaint on Day of Discharge:   Hospital follow up    History of Present Illness on Day of Discharge:   Seems more alert and oriented than on previous evals  Voiding without difficulty  Tolerating po    Hospital Course  Patient is a 77 y.o. female presented with altered mental status and increased weakness for 2 days.  She was living with her daughter at time of admission, but had recently been discharged from [facility] approximately 2 weeks prior.  At that time, she was walking 600 feet per day, oriented and performing her own ADLs according to the daughter.  Patient was admitted to the hospitalist service for further evaluation and treatment, neurology workup was initiated.  It was noted the patient's blood pressure was elevated in the ED with a systolic in the 200s.  The etiology of her altered mental status was unclear but felt to be multifactorial related to possible hypertensive encephalopathy versus possible progression of underlying dementia.  Imaging was negative for acute stroke.  During this hospitalization she began having issues with urinary  retention, requiring in and out catheter.  She apparently has had problems with this in the past but has not seen urology in over 2 years.  Dr. Simmons with urology saw the patient during this hospital stay felt this was likely a chronic ongoing problem.  She received 3 days of Rocephin for possible UTI and was started on Urecholine.  She is now voiding without difficulty.  She has continued to show improvement and is felt to be stable and ready for discharge to [facility] for long-term care.  Her blood pressure has remained stable for the last several days.  Of important note, was recommended by neurology to try low-dose Seroquel at bedtime if needed for agitation.  Exelon patch was initiated during this hospital stay and will be continued.  She is to follow-up with Dr. Topete on an outpatient basis, first available appointment, for possible underlying dementia and recheck of her neuro status.      Procedures Performed  None    Consults:   Consults     Date and Time Order Name Status Description    3/8/2017 1427 Inpatient Consult to Urology Completed     3/6/2017 1756 Inpatient Consult to Neurology Completed           Pertinent Test Results:  Cultures:    URINE CULTURE   Date Value Ref Range Status   03/10/2017 >100,000 CFU/mL Normal Urogenital Ngoc  Final     Component      Latest Ref Rng 3/6/2017 3/8/2017 3/10/2017   WBC      3.50 - 10.80 10*3/mm3 5.86 11.19 (H) 8.55   RBC      3.89 - 5.14 10*6/mm3 4.01 3.93 3.84 (L)   Hemoglobin      11.5 - 15.5 g/dL 11.8 11.7 11.2 (L)   Hematocrit      34.5 - 44.0 % 36.6 36.4 36.0   MCV      80.0 - 99.0 fL 91.3 92.6 93.8   MCH      27.0 - 31.0 pg 29.4 29.8 29.2   MCHC      32.0 - 36.0 g/dL 32.2 32.1 31.1 (L)   RDW      11.3 - 14.5 % 13.8 14.5 14.3   RDW-SD      37.0 - 54.0 fl 45.8 49.8 49.1   MPV      6.0 - 12.0 fL 11.5 11.4 11.7   Platelets      150 - 450 10*3/mm3 205 239 221   Neutrophil %      41.0 - 71.0 % 58.6 77.3 (H) 62.8   Lymphocyte %      24.0 - 44.0 % 30.9 14.7 (L) 24.0    Monocyte %      0.0 - 12.0 % 7.8 6.8 9.8   Eosinophil %      0.0 - 3.0 % 2.2 0.6 2.7   Basophil %      0.0 - 1.0 % 0.3 0.3 0.6   Immature Grans %      0.0 - 0.6 % 0.2 0.3 0.1   Neutrophils, Absolute      1.50 - 8.30 10*3/mm3 3.43 8.65 (H) 5.37   Lymphocytes, Absolute      0.60 - 4.80 10*3/mm3 1.81 1.65 2.05   Monocytes, Absolute      0.00 - 1.00 10*3/mm3 0.46 0.76 0.84   Eosinophils, Absolute      0.10 - 0.30 10*3/mm3 0.13 0.07 (L) 0.23   Basophils, Absolute      0.00 - 0.20 10*3/mm3 0.02 0.03 0.05   Immature Grans, Absolute      0.00 - 0.03 10*3/mm3 0.01 0.03 0.01   Glucose      70 - 100 mg/dL 100 148 (H) 117 (H)   BUN      9 - 23 mg/dL 16 13 16   Creatinine      0.60 - 1.30 mg/dL 0.70 0.70 0.70   Sodium      132 - 146 mmol/L 139 136 136   Potassium      3.5 - 5.5 mmol/L 3.8 3.8 3.6   Chloride      99 - 109 mmol/L 104 104 103   CO2      20.0 - 31.0 mmol/L 28.0 25.0 29.0   Calcium      8.7 - 10.4 mg/dL 10.9 (H) 10.4 10.0   Total Protein      5.7 - 8.2 g/dL 7.0     Albumin      3.20 - 4.80 g/dL 4.40     ALT (SGPT)      7 - 40 U/L 39     AST (SGOT)      0 - 33 U/L 29     Alkaline Phosphatase      25 - 100 U/L 76     Total Bilirubin      0.3 - 1.2 mg/dL 0.2 (L)     eGFR Non African Amer      >60 mL/min/1.73 81 81 81   Globulin      gm/dL 2.6     A/G Ratio      1.5 - 2.5 g/dL 1.7     BUN/Creatinine Ratio      7.0 - 25.0 22.9 18.6 22.9   Anion Gap      3.0 - 11.0 mmol/L 7.0 7.0 4.0     Component      Latest Ref Rng 3/6/2017 3/7/2017 3/8/2017   Total Cholesterol      0 - 200 mg/dL  164    Triglycerides      0 - 150 mg/dL  150    HDL Cholesterol      40 - 60 mg/dL  40    LDL Cholesterol       0 - 130 mg/dL  90    Hemoglobin A1C      4.80 - 5.60 % 6.30 (H)     TSH Baseline      0.350 - 5.350 mIU/mL 2.015     RPR      Non-Reactive Non-Reactive     Folate      3.20 - 20.00 ng/mL 14.91     Vitamin B-12      211 - 911 pg/mL 585     Magnesium      1.3 - 2.7 mg/dL   2.0     Component      Latest Ref Rng 3/6/2017 3/8/2017  "  Color, UA      Yellow, Straw Yellow Yellow   Appearance, UA      Clear Clear Cloudy (A)   pH, UA      5.0 - 8.0 6.0 7.0   Specific [place], UA      1.001 - 1.030 >=1.030 1.009   Glucose, UA      Negative Negative Negative   Ketones, UA      Negative Negative Negative   Bilirubin, UA      Negative Negative Negative   Blood, UA      Negative Negative Negative   Protein, UA      Negative 30 mg/dL (1+) (A) Negative   Leuk Esterase, UA      Negative Negative Small (1+) (A)   Nitrite, UA      Negative Negative Positive (A)   Urobilinogen, UA      0.2 - 1.0 E.U./dL 0.2 E.U./dL 0.2 E.U./dL   RBC, UA      None Seen, 0-2 /HPF 0-2 0-2   WBC, UA      None Seen /HPF 0-2 (A) 3-5 (A)   Bacteria, UA      None Seen, Trace /HPF None Seen 4+ (A)   Squamous Epithelial Cells, UA      None Seen, 0-2 /HPF 0-2 None Seen   Hyaline Casts, UA      0 - 6 /LPF 0-6 None Seen   Methodology:       Automated Microscopy Manual Light Microscopy     Condition on Discharge:  stable    Physical Exam on Discharge:  Visit Vitals   • BP (P) 144/56 (BP Location: Right arm, Patient Position: Lying)   • Pulse (P) 68   • Temp (P) 98.5 °F (36.9 °C) (Oral)   • Resp (P) 20   • Ht 57.01\" (144.8 cm)   • Wt 130 lb 1.1 oz (59 kg)   • LMP  (LMP Unknown)   • SpO2 95%   • BMI 28.14 kg/m2     Physical Exam   Constitutional: She appears well-developed and well-nourished. No distress.   HENT:   Head: Normocephalic.   Eyes: No scleral icterus.   Neck: Neck supple.   Cardiovascular: Normal rate and regular rhythm.  Exam reveals no gallop and no friction rub.    No murmur heard.  Pulmonary/Chest: Effort normal and breath sounds normal. No respiratory distress. She has no wheezes.   Abdominal: Soft. Bowel sounds are normal. She exhibits no distension. There is no guarding.   Musculoskeletal: She exhibits no edema.   Skin: Skin is warm and dry. No erythema.   Psychiatric: She has a normal mood and affect. Her behavior is normal.   Vitals reviewed.        Discharge " Disposition  Skilled Nursing Facility (DC - External)    Discharge Medications   Loni Walker   Home Medication Instructions GRANT:[id number]    Printed on:03/14/17 0674   Medication Information                      ALPRAZolam (XANAX) 0.5 MG tablet  Take 0.5 tablets by mouth 2 (Two) Times a Day As Needed for Anxiety.             atenolol (TENORMIN) 50 MG tablet  Take 50 mg by mouth 2 (Two) Times a Day.             atorvastatin (LIPITOR) 80 MG tablet  Take 1 tablet by mouth Every Night.             bethanechol (URECHOLINE) 10 MG tablet  Take 1 tablet by mouth 3 (Three) Times a Day.             clopidogrel (PLAVIX) 75 MG tablet  Take 75 mg by mouth Daily.             diltiaZEM CD (CARDIZEM CD) 240 MG 24 hr capsule  Take 1 capsule by mouth Every Night.             doxazosin (CARDURA) 2 MG tablet  Take 2 mg by mouth Every Night.             ferrous sulfate 325 (65 FE) MG tablet  Take 325 mg by mouth 2 (Two) Times a Day.             FLUoxetine (PROzac) 20 MG capsule  Take 1 capsule by mouth Daily.             fluticasone (FLONASE) 50 MCG/ACT nasal spray  2 sprays into each nostril Daily.             magnesium oxide (MAGOX) 400 (241.3 MG) MG tablet tablet  Take 400 mg by mouth 2 (Two) Times a Day.             methylphenidate (RITALIN) 5 MG tablet  Take 1 tablet by mouth Daily.             Multiple Vitamins-Minerals (MULTIVITAMIN PO)  Take 1 tablet by mouth Daily.             pantoprazole (PROTONIX) 40 MG EC tablet  Take 40 mg by mouth Daily.             rivastigmine (EXELON) 4.6 MG/24HR patch  Place 1 patch on the skin Daily.             saccharomyces boulardii (FLORASTOR) 250 MG capsule  Take 1 capsule by mouth 2 (Two) Times a Day.             vitamin C (ASCORBIC ACID) 250 MG tablet  Take 250 mg by mouth Daily.                 Discharge Diet:   Diet Instructions     Diet: Regular, Cardiac; Thin Liquids, No Restrictions       Discharge Diet:   Regular  Cardiac      Fluid Consistency:  Thin Liquids, No Restrictions                  Discharge Care Plan / Instructions:    Activity at Discharge:   Activity Instructions     Activity as Tolerated                     Follow-up Appointments  No future appointments.  Additional Instructions for the Follow-ups that You Need to Schedule     Discharge Follow-Up With Specified Provider    As directed    To:  Dr. Topete   Follow Up Details:  1st available       Discharge Follow-up with PCP    As directed    Follow Up Details:  at facility                    GABRIELLE Ayoub 03/14/17 8:16 AM    Time: Discharge 40 min    Please note that portions of this note may have been completed with a voice recognition program. Efforts were made to edit the dictations, but occasionally words are mistranscribed.       Electronically signed by GABRIELLE Ayoub at 3/14/2017  8:33 AM

## 2017-03-14 NOTE — PROGRESS NOTES
Continued Stay Note  [facility]     Patient Name: Loni Walker  MRN: [id number]  Today's Date: 3/14/2017    Admit Date: 3/6/2017          Discharge Plan       03/14/17 0944    Final Note    Final Note Pt to be transfered [facility]. Nurse to call report to [phone] ask for Station 1. Discharge Summary has been faxed to [phone].               Discharge Codes     None        Expected Discharge Date and Time     Expected Discharge Date Expected Discharge Time    Mar 14, 2017             Aydee Dave

## 2017-03-16 NOTE — THERAPY DISCHARGE NOTE
Acute Care - Occupational Therapy Discharge Summary  [facility]     Patient Name: Loni Walker  : 1940  MRN: [id number]    Today's Date: 3/16/2017  Onset of Illness/Injury or Date of Surgery Date: 17    Date of Referral to OT: 17  Referring Physician: GABRIELLE GASPAR      Admit Date: 3/6/2017        OT Recommendation and Plan    Visit Dx:    ICD-10-CM ICD-9-CM   1. Altered mental status, unspecified R41.82 780.97   2. Weakness R53.1 780.79   3. FTT (failure to thrive) in adult R62.7 783.7   4. Impaired mobility and ADLs Z74.09 799.89   5. Impaired functional mobility, balance, gait, and endurance Z74.09 V49.89                     OT Goals       17 1201 17 1019       Bed Mobility OT LTG    Bed Mobility OT LTG, Date Established  17  -AN     Bed Mobility OT LTG, Time to Achieve  1 wk  -AN     Bed Mobility OT LTG, Activity Type  supine to sit/sit to supine  -AN     Bed Mobility OT LTG, [place] Level  minimum assist (75% patient effort)  -AN     Bed Mobility OT LTG, Outcome goal ongoing  -TB      Transfer Training 2 OT STG    Transfer Training 2 OT STG, Date Established  17  -AN     Transfer Training 2 OT STG, Time to Achieve  1 wk  -AN     Transfer Training 2 OT STG, Activity Type  toilet  -AN     Transfer Training 2 OT STG, [place] Level  minimum assist (75% patient effort)  -AN     Transfer Training 2 OT STG, Outcome goal ongoing   Refused transfer  -TB      Toileting OT LTG    Toileting Goal OT LTG, Date Established  17  -AN     Toileting Goal OT LTG, Time to Achieve  1 wk  -AN     Toileting Goal OT LTG, [place] Level  minimum assist (75% patient effort)  -AN     Toileting Goal OT LTG, Outcome goal ongoing   Refused BSC transfer  -TB      Coordination OT LTG    Coordination OT LTG, Date Established  17  -AN     Coordination OT LTG, Time to Achieve  1 wk  -AN     Coordination OT LTG, Activity Type  FM task;GM task  -AN     Coordination OT LTG,  [place] Level  standby assist  -AN     Coordination OT LTG, Outcome goal ongoing  -TB        User Key  (r) = Recorded By, (t) = Taken By, (c) = Cosigned By    Initials Name Provider Type    TB Raquel Fung, OT Occupational Therapist    KAR Mullen, OT Occupational Therapist                  OT Discharge Summary  Reason for Discharge: Discharge from facility  Outcomes Achieved: Refer to plan of care for updates on goals achieved  Discharge Destination: [facility]      Aydee Carter OT  3/16/2017

## 2017-03-16 NOTE — THERAPY DISCHARGE NOTE
Acute Care - Physical Therapy Discharge Summary  [facility]       Patient Name: Loni Walker  : 1940  MRN: [id number]    Today's Date: 3/16/2017  Onset of Illness/Injury or Date of Surgery Date: 17    Date of Referral to PT: 17  Referring Physician: GABRIELLE GASPAR      Admit Date: 3/6/2017      PT Recommendation and Plan    Visit Dx:    ICD-10-CM ICD-9-CM   1. Altered mental status, unspecified R41.82 780.97   2. Weakness R53.1 780.79   3. FTT (failure to thrive) in adult R62.7 783.7   4. Impaired mobility and ADLs Z74.09 799.89   5. Impaired functional mobility, balance, gait, and endurance Z74.09 V49.89                       IP PT Goals       17 1058 03/10/17 1025 17 1412    Bed Mobility PT LTG    Bed Mobility PT LTG, Outcome goal ongoing  -KM goal ongoing  -UD goal ongoing  -UD    Transfer Training PT LTG    Transfer Training PT LTG, Outcome goal ongoing  -KM goal ongoing  -UD goal ongoing  -UD    Gait Training PT LTG    Gait Training Goal PT LTG, Outcome goal ongoing  -KM goal ongoing  -UD goal ongoing  -UD      17 1008          Bed Mobility PT LTG    Bed Mobility PT LTG, Time to Achieve 2 wks  -CD      Bed Mobility PT LTG, Activity Type all bed mobility  -CD      Bed Mobility PT LTG, [place] Level minimum assist (75% patient effort)  -CD      Transfer Training PT LTG    Transfer Training PT LTG, Time to Achieve 2 wks  -CD      Transfer Training PT LTG, Activity Type all transfers  -CD      Transfer Training PT LTG, [place] Level minimum assist (75% patient effort)  -CD      Transfer Training PT LTG, Assist Device walker, rolling  -CD      Gait Training PT LTG    Gait Training Goal PT LTG, Time to Achieve 2 wks  -CD      Gait Training Goal PT LTG, [place] Level moderate assist (50% patient effort)  -CD      Gait Training Goal PT LTG, Assist Device walker, rolling  -CD      Gait Training Goal PT LTG, Distance to Achieve 150  -CD        User Key  (r) = Recorded  By, (t) = Taken By, (c) = Cosigned By    Initials Name Provider Type    UD Sofía Cade, PTA Physical Therapy Assistant    CD Sheri Aleman, PT Physical Therapist    JACOBY Beaver, PT Physical Therapist              PT Discharge Summary  Reason for Discharge: Discharge from facility  Outcomes Achieved: Refer to plan of care for updates on goals achieved  Discharge Destination: SNF      Lloyd Jaimes, PT   3/16/2017

## 2020-01-15 ENCOUNTER — LAB REQUISITION (OUTPATIENT)
Dept: LAB | Facility: HOSPITAL | Age: 80
End: 2020-01-15

## 2020-01-15 DIAGNOSIS — Z00.00 ROUTINE GENERAL MEDICAL EXAMINATION AT A HEALTH CARE FACILITY: ICD-10-CM

## 2020-01-15 LAB
ANION GAP SERPL CALCULATED.3IONS-SCNC: 13 MMOL/L (ref 5–15)
BUN BLD-MCNC: 19 MG/DL (ref 8–23)
BUN/CREAT SERPL: 27.1 (ref 7–25)
CALCIUM SPEC-SCNC: 9.4 MG/DL (ref 8.6–10.5)
CHLORIDE SERPL-SCNC: 98 MMOL/L (ref 98–107)
CO2 SERPL-SCNC: 27 MMOL/L (ref 22–29)
CREAT BLD-MCNC: 0.7 MG/DL (ref 0.57–1)
DEPRECATED RDW RBC AUTO: 45.5 FL (ref 37–54)
ERYTHROCYTE [DISTWIDTH] IN BLOOD BY AUTOMATED COUNT: 12.9 % (ref 12.3–15.4)
GFR SERPL CREATININE-BSD FRML MDRD: 80 ML/MIN/1.73
GFR SERPL CREATININE-BSD FRML MDRD: 97 ML/MIN/1.73
GLUCOSE BLD-MCNC: 78 MG/DL (ref 65–99)
HCT VFR BLD AUTO: 33.6 % (ref 34–46.6)
HGB BLD-MCNC: 10.3 G/DL (ref 12–15.9)
MCH RBC QN AUTO: 29.6 PG (ref 26.6–33)
MCHC RBC AUTO-ENTMCNC: 30.7 G/DL (ref 31.5–35.7)
MCV RBC AUTO: 96.6 FL (ref 79–97)
PLATELET # BLD AUTO: 271 10*3/MM3 (ref 140–450)
PMV BLD AUTO: 11.5 FL (ref 6–12)
POTASSIUM BLD-SCNC: 4.4 MMOL/L (ref 3.5–5.2)
RBC # BLD AUTO: 3.48 10*6/MM3 (ref 3.77–5.28)
SODIUM BLD-SCNC: 138 MMOL/L (ref 136–145)
WBC NRBC COR # BLD: 9.44 10*3/MM3 (ref 3.4–10.8)

## 2020-01-15 PROCEDURE — 80048 BASIC METABOLIC PNL TOTAL CA: CPT

## 2020-01-15 PROCEDURE — 85027 COMPLETE CBC AUTOMATED: CPT

## 2020-04-01 ENCOUNTER — LAB REQUISITION (OUTPATIENT)
Dept: LAB | Facility: HOSPITAL | Age: 80
End: 2020-04-01

## 2020-04-01 DIAGNOSIS — Z00.00 ROUTINE GENERAL MEDICAL EXAMINATION AT A HEALTH CARE FACILITY: ICD-10-CM

## 2020-04-01 LAB
DEPRECATED RDW RBC AUTO: 47 FL (ref 37–54)
ERYTHROCYTE [DISTWIDTH] IN BLOOD BY AUTOMATED COUNT: 13.6 % (ref 12.3–15.4)
HCT VFR BLD AUTO: 25.9 % (ref 34–46.6)
HGB BLD-MCNC: 7.8 G/DL (ref 12–15.9)
MCH RBC QN AUTO: 28.8 PG (ref 26.6–33)
MCHC RBC AUTO-ENTMCNC: 30.1 G/DL (ref 31.5–35.7)
MCV RBC AUTO: 95.6 FL (ref 79–97)
PLATELET # BLD AUTO: 296 10*3/MM3 (ref 140–450)
PMV BLD AUTO: 11 FL (ref 6–12)
RBC # BLD AUTO: 2.71 10*6/MM3 (ref 3.77–5.28)
WBC NRBC COR # BLD: 8.28 10*3/MM3 (ref 3.4–10.8)

## 2020-04-01 PROCEDURE — 85027 COMPLETE CBC AUTOMATED: CPT | Performed by: FAMILY MEDICINE

## 2020-04-03 ENCOUNTER — APPOINTMENT (OUTPATIENT)
Dept: GENERAL RADIOLOGY | Facility: HOSPITAL | Age: 80
End: 2020-04-03

## 2020-04-03 ENCOUNTER — APPOINTMENT (OUTPATIENT)
Dept: CT IMAGING | Facility: HOSPITAL | Age: 80
End: 2020-04-03

## 2020-04-03 ENCOUNTER — HOSPITAL ENCOUNTER (INPATIENT)
Facility: HOSPITAL | Age: 80
LOS: 5 days | Discharge: NURSING, REHAB OR LONG-TERM CARE | End: 2020-04-08
Attending: EMERGENCY MEDICINE | Admitting: FAMILY MEDICINE

## 2020-04-03 DIAGNOSIS — R53.1 GENERALIZED WEAKNESS: ICD-10-CM

## 2020-04-03 DIAGNOSIS — D64.9 ANEMIA, UNSPECIFIED TYPE: ICD-10-CM

## 2020-04-03 DIAGNOSIS — Z78.9 IMPAIRED MOBILITY AND ADLS: ICD-10-CM

## 2020-04-03 DIAGNOSIS — Z74.09 IMPAIRED MOBILITY AND ADLS: ICD-10-CM

## 2020-04-03 DIAGNOSIS — C20 RECTAL CANCER METASTASIZED TO INTRA-ABDOMINAL LYMPH NODE (HCC): ICD-10-CM

## 2020-04-03 DIAGNOSIS — C77.2 RECTAL CANCER METASTASIZED TO INTRA-ABDOMINAL LYMPH NODE (HCC): ICD-10-CM

## 2020-04-03 DIAGNOSIS — K62.5 RECTAL BLEEDING: Primary | ICD-10-CM

## 2020-04-03 PROBLEM — D62 ACUTE BLOOD LOSS ANEMIA: Status: ACTIVE | Noted: 2020-04-03

## 2020-04-03 PROBLEM — E78.2 MIXED HYPERLIPIDEMIA: Status: ACTIVE | Noted: 2020-04-03

## 2020-04-03 PROBLEM — Z87.898 HISTORY OF URINARY RETENTION: Status: ACTIVE | Noted: 2017-03-09

## 2020-04-03 PROBLEM — N13.30 HYDRONEPHROSIS: Status: ACTIVE | Noted: 2020-04-03

## 2020-04-03 LAB
ABO GROUP BLD: NORMAL
ABO GROUP BLD: NORMAL
ALBUMIN SERPL-MCNC: 3.9 G/DL (ref 3.5–5.2)
ALBUMIN/GLOB SERPL: 1.1 G/DL
ALP SERPL-CCNC: 69 U/L (ref 39–117)
ALT SERPL W P-5'-P-CCNC: 7 U/L (ref 1–33)
ANION GAP SERPL CALCULATED.3IONS-SCNC: 10 MMOL/L (ref 5–15)
AST SERPL-CCNC: 13 U/L (ref 1–32)
BASOPHILS # BLD AUTO: 0.05 10*3/MM3 (ref 0–0.2)
BASOPHILS NFR BLD AUTO: 0.7 % (ref 0–1.5)
BILIRUB SERPL-MCNC: 0.2 MG/DL (ref 0.2–1.2)
BILIRUB UR QL STRIP: NEGATIVE
BLD GP AB SCN SERPL QL: NEGATIVE
BUN BLD-MCNC: 12 MG/DL (ref 8–23)
BUN/CREAT SERPL: 14.6 (ref 7–25)
CALCIUM SPEC-SCNC: 9.2 MG/DL (ref 8.6–10.5)
CHLORIDE SERPL-SCNC: 100 MMOL/L (ref 98–107)
CLARITY UR: CLEAR
CO2 SERPL-SCNC: 27 MMOL/L (ref 22–29)
COLOR UR: YELLOW
CREAT BLD-MCNC: 0.82 MG/DL (ref 0.57–1)
DEPRECATED RDW RBC AUTO: 46.9 FL (ref 37–54)
DEVELOPER EXPIRATION DATE: 1
DEVELOPER LOT NUMBER: 1
EOSINOPHIL # BLD AUTO: 0.15 10*3/MM3 (ref 0–0.4)
EOSINOPHIL NFR BLD AUTO: 2 % (ref 0.3–6.2)
ERYTHROCYTE [DISTWIDTH] IN BLOOD BY AUTOMATED COUNT: 13.8 % (ref 12.3–15.4)
EXPIRATION DATE: ABNORMAL
FECAL OCCULT BLOOD SCREEN, POC: POSITIVE
FERRITIN SERPL-MCNC: 443.5 NG/ML (ref 13–150)
GFR SERPL CREATININE-BSD FRML MDRD: 67 ML/MIN/1.73
GLOBULIN UR ELPH-MCNC: 3.4 GM/DL
GLUCOSE BLD-MCNC: 127 MG/DL (ref 65–99)
GLUCOSE UR STRIP-MCNC: NEGATIVE MG/DL
HBA1C MFR BLD: 5.4 % (ref 4.8–5.6)
HCT VFR BLD AUTO: 23.4 % (ref 34–46.6)
HCT VFR BLD AUTO: 25.8 % (ref 34–46.6)
HGB BLD-MCNC: 7.2 G/DL (ref 12–15.9)
HGB BLD-MCNC: 7.8 G/DL (ref 12–15.9)
HGB UR QL STRIP.AUTO: NEGATIVE
HOLD SPECIMEN: NORMAL
HOLD SPECIMEN: NORMAL
IMM GRANULOCYTES # BLD AUTO: 0.02 10*3/MM3 (ref 0–0.05)
IMM GRANULOCYTES NFR BLD AUTO: 0.3 % (ref 0–0.5)
INR PPP: 1.07 (ref 0.85–1.16)
IRON 24H UR-MRATE: 17 MCG/DL (ref 37–145)
IRON SATN MFR SERPL: 7 % (ref 20–50)
KETONES UR QL STRIP: NEGATIVE
LEUKOCYTE ESTERASE UR QL STRIP.AUTO: NEGATIVE
LIPASE SERPL-CCNC: 15 U/L (ref 13–60)
LYMPHOCYTES # BLD AUTO: 1.23 10*3/MM3 (ref 0.7–3.1)
LYMPHOCYTES NFR BLD AUTO: 16 % (ref 19.6–45.3)
Lab: ABNORMAL
MCH RBC QN AUTO: 28.6 PG (ref 26.6–33)
MCHC RBC AUTO-ENTMCNC: 30.2 G/DL (ref 31.5–35.7)
MCV RBC AUTO: 94.5 FL (ref 79–97)
MONOCYTES # BLD AUTO: 0.46 10*3/MM3 (ref 0.1–0.9)
MONOCYTES NFR BLD AUTO: 6 % (ref 5–12)
NEGATIVE CONTROL: NEGATIVE
NEUTROPHILS # BLD AUTO: 5.76 10*3/MM3 (ref 1.7–7)
NEUTROPHILS NFR BLD AUTO: 75 % (ref 42.7–76)
NITRITE UR QL STRIP: NEGATIVE
NRBC BLD AUTO-RTO: 0 /100 WBC (ref 0–0.2)
NT-PROBNP SERPL-MCNC: 486.4 PG/ML (ref 5–1800)
PH UR STRIP.AUTO: 6 [PH] (ref 5–8)
PLATELET # BLD AUTO: 284 10*3/MM3 (ref 140–450)
PMV BLD AUTO: 10.6 FL (ref 6–12)
POSITIVE CONTROL: POSITIVE
POTASSIUM BLD-SCNC: 4.2 MMOL/L (ref 3.5–5.2)
PROT SERPL-MCNC: 7.3 G/DL (ref 6–8.5)
PROT UR QL STRIP: NEGATIVE
PROTHROMBIN TIME: 13.6 SECONDS (ref 11.5–14)
RBC # BLD AUTO: 2.73 10*6/MM3 (ref 3.77–5.28)
RETICS # AUTO: 0.08 10*6/MM3 (ref 0.02–0.13)
RETICS/RBC NFR AUTO: 3.03 % (ref 0.7–1.9)
RH BLD: POSITIVE
RH BLD: POSITIVE
SODIUM BLD-SCNC: 137 MMOL/L (ref 136–145)
SP GR UR STRIP: 1.01 (ref 1–1.03)
T&S EXPIRATION DATE: NORMAL
TIBC SERPL-MCNC: 232 MCG/DL (ref 298–536)
TRANSFERRIN SERPL-MCNC: 156 MG/DL (ref 200–360)
TROPONIN T SERPL-MCNC: <0.01 NG/ML (ref 0–0.03)
UROBILINOGEN UR QL STRIP: NORMAL
WBC NRBC COR # BLD: 7.67 10*3/MM3 (ref 3.4–10.8)
WHOLE BLOOD HOLD SPECIMEN: NORMAL
WHOLE BLOOD HOLD SPECIMEN: NORMAL

## 2020-04-03 PROCEDURE — 25010000002 ONDANSETRON PER 1 MG: Performed by: EMERGENCY MEDICINE

## 2020-04-03 PROCEDURE — 85014 HEMATOCRIT: CPT | Performed by: INTERNAL MEDICINE

## 2020-04-03 PROCEDURE — 86901 BLOOD TYPING SEROLOGIC RH(D): CPT

## 2020-04-03 PROCEDURE — 85018 HEMOGLOBIN: CPT | Performed by: INTERNAL MEDICINE

## 2020-04-03 PROCEDURE — 99285 EMERGENCY DEPT VISIT HI MDM: CPT

## 2020-04-03 PROCEDURE — 99223 1ST HOSP IP/OBS HIGH 75: CPT | Performed by: INTERNAL MEDICINE

## 2020-04-03 PROCEDURE — 84466 ASSAY OF TRANSFERRIN: CPT | Performed by: INTERNAL MEDICINE

## 2020-04-03 PROCEDURE — 25010000002 IOPAMIDOL 61 % SOLUTION: Performed by: EMERGENCY MEDICINE

## 2020-04-03 PROCEDURE — 83690 ASSAY OF LIPASE: CPT | Performed by: PHYSICIAN ASSISTANT

## 2020-04-03 PROCEDURE — P9612 CATHETERIZE FOR URINE SPEC: HCPCS

## 2020-04-03 PROCEDURE — 80053 COMPREHEN METABOLIC PANEL: CPT

## 2020-04-03 PROCEDURE — 85025 COMPLETE CBC W/AUTO DIFF WBC: CPT

## 2020-04-03 PROCEDURE — 82746 ASSAY OF FOLIC ACID SERUM: CPT | Performed by: INTERNAL MEDICINE

## 2020-04-03 PROCEDURE — 82728 ASSAY OF FERRITIN: CPT | Performed by: INTERNAL MEDICINE

## 2020-04-03 PROCEDURE — 82607 VITAMIN B-12: CPT | Performed by: INTERNAL MEDICINE

## 2020-04-03 PROCEDURE — 84484 ASSAY OF TROPONIN QUANT: CPT | Performed by: PHYSICIAN ASSISTANT

## 2020-04-03 PROCEDURE — 83540 ASSAY OF IRON: CPT | Performed by: INTERNAL MEDICINE

## 2020-04-03 PROCEDURE — 83036 HEMOGLOBIN GLYCOSYLATED A1C: CPT | Performed by: NURSE PRACTITIONER

## 2020-04-03 PROCEDURE — 81003 URINALYSIS AUTO W/O SCOPE: CPT | Performed by: PHYSICIAN ASSISTANT

## 2020-04-03 PROCEDURE — 85610 PROTHROMBIN TIME: CPT | Performed by: PHYSICIAN ASSISTANT

## 2020-04-03 PROCEDURE — 86923 COMPATIBILITY TEST ELECTRIC: CPT

## 2020-04-03 PROCEDURE — 82270 OCCULT BLOOD FECES: CPT | Performed by: PHYSICIAN ASSISTANT

## 2020-04-03 PROCEDURE — 85045 AUTOMATED RETICULOCYTE COUNT: CPT | Performed by: INTERNAL MEDICINE

## 2020-04-03 PROCEDURE — 82378 CARCINOEMBRYONIC ANTIGEN: CPT | Performed by: INTERNAL MEDICINE

## 2020-04-03 PROCEDURE — 83880 ASSAY OF NATRIURETIC PEPTIDE: CPT | Performed by: PHYSICIAN ASSISTANT

## 2020-04-03 PROCEDURE — 86900 BLOOD TYPING SEROLOGIC ABO: CPT

## 2020-04-03 PROCEDURE — 86850 RBC ANTIBODY SCREEN: CPT

## 2020-04-03 PROCEDURE — 71045 X-RAY EXAM CHEST 1 VIEW: CPT

## 2020-04-03 PROCEDURE — 74177 CT ABD & PELVIS W/CONTRAST: CPT

## 2020-04-03 RX ORDER — ONDANSETRON 2 MG/ML
4 INJECTION INTRAMUSCULAR; INTRAVENOUS EVERY 6 HOURS PRN
Status: DISCONTINUED | OUTPATIENT
Start: 2020-04-03 | End: 2020-04-08 | Stop reason: HOSPADM

## 2020-04-03 RX ORDER — SODIUM CHLORIDE 9 MG/ML
50 INJECTION, SOLUTION INTRAVENOUS CONTINUOUS
Status: DISCONTINUED | OUTPATIENT
Start: 2020-04-03 | End: 2020-04-07

## 2020-04-03 RX ORDER — PANTOPRAZOLE SODIUM 40 MG/10ML
40 INJECTION, POWDER, LYOPHILIZED, FOR SOLUTION INTRAVENOUS
Status: DISCONTINUED | OUTPATIENT
Start: 2020-04-04 | End: 2020-04-08 | Stop reason: HOSPADM

## 2020-04-03 RX ORDER — ONDANSETRON 4 MG/1
4 TABLET, FILM COATED ORAL EVERY 6 HOURS PRN
COMMUNITY

## 2020-04-03 RX ORDER — HYDRALAZINE HYDROCHLORIDE 20 MG/ML
10 INJECTION INTRAMUSCULAR; INTRAVENOUS EVERY 6 HOURS PRN
Status: DISCONTINUED | OUTPATIENT
Start: 2020-04-03 | End: 2020-04-08 | Stop reason: HOSPADM

## 2020-04-03 RX ORDER — CLONIDINE HYDROCHLORIDE 0.1 MG/1
0.1 TABLET ORAL 2 TIMES DAILY
COMMUNITY

## 2020-04-03 RX ORDER — POLYETHYLENE GLYCOL 3350 17 G/17G
17 POWDER, FOR SOLUTION ORAL DAILY
COMMUNITY

## 2020-04-03 RX ORDER — BETHANECHOL CHLORIDE 10 MG/1
10 TABLET ORAL 3 TIMES DAILY
Status: DISCONTINUED | OUTPATIENT
Start: 2020-04-03 | End: 2020-04-05

## 2020-04-03 RX ORDER — ACETAMINOPHEN 325 MG/1
650 TABLET ORAL ONCE
Status: COMPLETED | OUTPATIENT
Start: 2020-04-03 | End: 2020-04-03

## 2020-04-03 RX ORDER — RIVASTIGMINE 4.6 MG/24H
1 PATCH, EXTENDED RELEASE TRANSDERMAL DAILY
Status: DISCONTINUED | OUTPATIENT
Start: 2020-04-03 | End: 2020-04-03

## 2020-04-03 RX ORDER — RIVASTIGMINE 4.6 MG/24H
1 PATCH, EXTENDED RELEASE TRANSDERMAL DAILY
Status: DISCONTINUED | OUTPATIENT
Start: 2020-04-03 | End: 2020-04-08 | Stop reason: HOSPADM

## 2020-04-03 RX ORDER — SERTRALINE HYDROCHLORIDE 100 MG/1
100 TABLET, FILM COATED ORAL DAILY
COMMUNITY

## 2020-04-03 RX ORDER — ONDANSETRON 2 MG/ML
4 INJECTION INTRAMUSCULAR; INTRAVENOUS ONCE
Status: COMPLETED | OUTPATIENT
Start: 2020-04-03 | End: 2020-04-03

## 2020-04-03 RX ORDER — SODIUM CHLORIDE 0.9 % (FLUSH) 0.9 %
10 SYRINGE (ML) INJECTION EVERY 12 HOURS SCHEDULED
Status: DISCONTINUED | OUTPATIENT
Start: 2020-04-03 | End: 2020-04-08 | Stop reason: HOSPADM

## 2020-04-03 RX ORDER — SODIUM CHLORIDE 0.9 % (FLUSH) 0.9 %
10 SYRINGE (ML) INJECTION AS NEEDED
Status: DISCONTINUED | OUTPATIENT
Start: 2020-04-03 | End: 2020-04-08 | Stop reason: HOSPADM

## 2020-04-03 RX ORDER — TRAZODONE HYDROCHLORIDE 50 MG/1
50 TABLET ORAL NIGHTLY
COMMUNITY

## 2020-04-03 RX ORDER — DIVALPROEX SODIUM 250 MG/1
125 TABLET, EXTENDED RELEASE ORAL 2 TIMES DAILY
COMMUNITY

## 2020-04-03 RX ORDER — OXYBUTYNIN CHLORIDE 5 MG/1
5 TABLET ORAL 3 TIMES DAILY
COMMUNITY

## 2020-04-03 RX ORDER — FLUOXETINE HYDROCHLORIDE 20 MG/1
20 CAPSULE ORAL DAILY
Status: DISCONTINUED | OUTPATIENT
Start: 2020-04-03 | End: 2020-04-05

## 2020-04-03 RX ORDER — BUSPIRONE HYDROCHLORIDE 5 MG/1
5 TABLET ORAL 2 TIMES DAILY
COMMUNITY

## 2020-04-03 RX ORDER — ATENOLOL 50 MG/1
50 TABLET ORAL EVERY 12 HOURS SCHEDULED
Status: DISCONTINUED | OUTPATIENT
Start: 2020-04-03 | End: 2020-04-08 | Stop reason: HOSPADM

## 2020-04-03 RX ORDER — ALPRAZOLAM 0.25 MG/1
0.25 TABLET ORAL 2 TIMES DAILY PRN
Status: DISCONTINUED | OUTPATIENT
Start: 2020-04-03 | End: 2020-04-04

## 2020-04-03 RX ORDER — HEPARIN SODIUM (PORCINE) LOCK FLUSH IV SOLN 100 UNIT/ML 100 UNIT/ML
500 SOLUTION INTRAVENOUS ONCE
Status: DISCONTINUED | OUTPATIENT
Start: 2020-04-03 | End: 2020-04-03

## 2020-04-03 RX ORDER — PANTOPRAZOLE SODIUM 40 MG/10ML
40 INJECTION, POWDER, LYOPHILIZED, FOR SOLUTION INTRAVENOUS ONCE
Status: COMPLETED | OUTPATIENT
Start: 2020-04-03 | End: 2020-04-03

## 2020-04-03 RX ORDER — ACETAMINOPHEN 500 MG
500 TABLET ORAL EVERY 4 HOURS PRN
COMMUNITY

## 2020-04-03 RX ORDER — LANOLIN ALCOHOL/MO/W.PET/CERES
3 CREAM (GRAM) TOPICAL NIGHTLY
COMMUNITY

## 2020-04-03 RX ORDER — AMLODIPINE BESYLATE 5 MG/1
5 TABLET ORAL DAILY
COMMUNITY

## 2020-04-03 RX ORDER — LOPERAMIDE HYDROCHLORIDE 2 MG/1
2 CAPSULE ORAL EVERY 6 HOURS PRN
COMMUNITY
End: 2020-04-08 | Stop reason: HOSPADM

## 2020-04-03 RX ORDER — HYDROCODONE BITARTRATE AND ACETAMINOPHEN 10; 325 MG/1; MG/1
1 TABLET ORAL EVERY 6 HOURS PRN
COMMUNITY

## 2020-04-03 RX ORDER — SENNA PLUS 8.6 MG/1
1 TABLET ORAL DAILY
COMMUNITY

## 2020-04-03 RX ADMIN — PANTOPRAZOLE SODIUM 40 MG: 40 INJECTION, POWDER, FOR SOLUTION INTRAVENOUS at 13:51

## 2020-04-03 RX ADMIN — BETHANECHOL CHLORIDE 10 MG: 10 TABLET ORAL at 20:27

## 2020-04-03 RX ADMIN — FLUOXETINE HYDROCHLORIDE 20 MG: 20 CAPSULE ORAL at 20:27

## 2020-04-03 RX ADMIN — ACETAMINOPHEN 650 MG: 325 TABLET, FILM COATED ORAL at 15:21

## 2020-04-03 RX ADMIN — ONDANSETRON 4 MG: 2 INJECTION INTRAMUSCULAR; INTRAVENOUS at 15:23

## 2020-04-03 RX ADMIN — IOPAMIDOL 95 ML: 612 INJECTION, SOLUTION INTRAVENOUS at 14:18

## 2020-04-03 RX ADMIN — RIVASTIGMINE TRANSDERMAL SYSTEM 1 PATCH: 4.6 PATCH, EXTENDED RELEASE TRANSDERMAL at 20:28

## 2020-04-03 RX ADMIN — SODIUM CHLORIDE 50 ML/HR: 9 INJECTION, SOLUTION INTRAVENOUS at 20:31

## 2020-04-03 NOTE — H&P
[facility] Medicine Services  HISTORY AND PHYSICAL    Patient Name: Loni Walker  : 1940  MRN: [id number]  Primary Care Physician: Josie, No Known  Date of admission: 4/3/2020      Subjective   Subjective     Chief Complaint:  Rectal bleeding for 2 week with abd pain that started 2-3 days ago.      HPI:  Loni Walker is a 80 y.o. female with PmHx of CVA, HTN, Migraines, Osteoarthritis, CAD, HLD presenting to the ED with abd pain that started 2-3 days ago with intermittent episodes of bright red blood in stool 2 weeks ago. She reports decreased appetite and a 20 lb weight loss over the last 6 months. She denies nausea, vomiting, constipation, diarrhea, dizziness, fever, chest pain. She can't remember her last colonoscopy. She resides at [place]. Facility reports H&H 6.4/19.9. No history of GI bleed. She takes plavix. Past history of smoking, quit 3 years ago. Abdominal surgery includes hysterectomy, cholecystectomy.      Review of Systems   Gen- No fevers, chills  CV- No chest pain, palpitations  Resp- No cough, dyspnea  GI- No N/V/D (+) abd pain, decreased appetite, rectal bleeding    All other systems reviewed and are negative.     Personal History     Past Medical History:   Diagnosis Date   • Arthritis    • Carotid stenosis    • Cataracts, bilateral    • Coronary artery disease    • CVA (cerebral vascular accident) (CMS/HCC)    • Hx of migraines    • Hypertension      Past Surgical History:   Procedure Laterality Date   • ABDOMINAL SURGERY     • CHOLECYSTECTOMY     • HYSTERECTOMY       Family History: family history includes Arthritis in her mother; Diabetes in her father; Early death in her mother; Heart disease in her father; Hyperlipidemia in her father; Hypertension in her daughter and father. Otherwise pertinent FHx was reviewed and unremarkable.     Social History:  reports that she quit smoking about 3 years ago. She has a 15.00 pack-year smoking history. She has  never used smokeless tobacco. She reports that she does not drink alcohol or use drugs.  Social History     Social History Narrative   • Not on file     Medications:    Available home medication information reviewed.    Allergies   Allergen Reactions   • Hydrocodone Hives   • Oxycodone Hives   • Penicillins Hives   • Sulfa Antibiotics Hives   • Doxycycline    • Lyrica [Pregabalin]      Objective   Objective     Vital Signs:   Temp:  [98.1 °F (36.7 °C)-98.3 °F (36.8 °C)] 98.3 °F (36.8 °C)  Heart Rate:  [56-81] 71  Resp:  [18-20] 20  BP: (102-135)/(40-72) 117/44      Physical Exam   Constitutional: Awake, alert, pale appearing  Eyes: PERRLA, sclerae anicteric, no conjunctival injection  HENT: NCAT, mucous membranes moist  Neck: Supple, no thyromegaly, no lymphadenopathy, trachea midline  Respiratory: Clear to auscultation bilaterally, nonlabored respirations   Cardiovascular: RRR, no murmurs, rubs, or gallops, palpable pedal pulses bilaterally  Gastrointestinal: Positive bowel sounds, abd distention, tenderness at the suprapubic abd area  Musculoskeletal: +2 ankle edema, no clubbing or cyanosis to extremities  Psychiatric: Appropriate affect, cooperative  Neurologic: Oriented x 3, strength symmetric in all extremities, Cranial Nerves grossly intact to confrontation, speech clear  Skin: No rashes    Results Reviewed:  I have personally reviewed current lab and radiology data.    Results from last 7 days   Lab Units 04/03/20  1958 04/03/20  1304   WBC 10*3/mm3  --  7.67   HEMOGLOBIN g/dL 7.2* 7.8*   HEMATOCRIT % 23.4* 25.8*   PLATELETS 10*3/mm3  --  284   INR   --  1.07     Results from last 7 days   Lab Units 04/03/20  1303   SODIUM mmol/L 137   POTASSIUM mmol/L 4.2   CHLORIDE mmol/L 100   CO2 mmol/L 27.0   BUN mg/dL 12   CREATININE mg/dL 0.82   GLUCOSE mg/dL 127*   CALCIUM mg/dL 9.2   ALT (SGPT) U/L 7   AST (SGOT) U/L 13   TROPONIN T ng/mL <0.010   PROBNP pg/mL 486.4     Estimated Creatinine Clearance: 45.5 mL/min (by  C-G formula based on SCr of 0.82 mg/dL).  Brief Urine Lab Results  (Last result in the past 365 days)      Color   Clarity   Blood   Leuk Est   Nitrite   Protein   CREAT   Urine HCG        04/03/20 1342 Yellow Clear Negative Negative Negative Negative             Imaging Results (Last 24 Hours)     Procedure Component Value Units Date/Time    XR Chest 1 View [[id number]] Collected:  04/03/20 1501     Updated:  04/03/20 1640    Narrative:          EXAMINATION: XR CHEST 1 VW - 04/03/2020     INDICATION: GI bleed, weakness.     COMPARISON: 03/06/2017     FINDINGS: Portable chest reveals heart to be enlarged. There is  increased pulmonary vascularity bilaterally. No focal parenchymal  opacification is present. No pleural effusion or pneumothorax. The bony  structures are unremarkable. The pulmonary vascularity is within normal  limits.       Impression:       Prominence of the pulmonary vascularity. Heart is enlarged.  No acute parenchymal disease.     DICTATED:   04/03/2020  EDITED/ls :   04/03/2020           CT Abdomen Pelvis With Contrast [[id number]] Collected:  04/03/20 1431     Updated:  04/03/20 1534    Narrative:       EXAMINATION: CT ABDOMEN/PELVIS W CONTRAST - 04/03/2020      INDICATION: Generalized abdominal pain.     TECHNIQUE: Multiple axial CT imaging is obtained of the abdomen and  pelvis following the administration of intravenous contrast.      The radiation dose reduction device was turned on for each scan per the  ALARA (As Low as Reasonably Achievable) protocol.     COMPARISON: None.     FINDINGS: Lung bases are clear. Small retrocrural lymph nodes identified  adjacent the aorta at the diaphragmatic hiatus. No bulky adenopathy.  Liver is homogeneous in appearance. The spleen is unremarkable. The  kidneys and adrenal glands are within normal limits. 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 There is adenopathy identified  throughout  the retroperitoneum. The largest left periaortic lymph node  measures 2 cm in size. Surgical clips seen in the right upper quadrant  from prior cholecystectomy. There is incomplete distention identified of  the stomach. Pancreas is homogeneous in appearance. There is moderate  distention identified of the right kidney. There is dilatation  identified of the right ureter to the right UVJ where there is no  evidence of a stone. The left kidney is unremarkable in appearance.  Vascular calcification seen within the abdominal aorta and iliac  vessels. No free fluid or free air.     PELVIS: Pelvic organs are unremarkable. The pelvic portion of the  gastrointestinal tract reveals abnormal wall thickening seen of the  rectum. A rectal malignancy cannot be excluded. There is no bulky pelvic  adenopathy. No free fluid or free air. Mild distention of the bladder.  No abnormal mass or fluid collections identified. Degenerative changes  seen within the spine and pelvis. Curvature identified the spine with  convexity to the right. Delayed imaging reveals contrast seen in the  renal collecting systems bilaterally as well as within the ureters and  bladder.       Impression:       1. Abnormal wall thickening identified of the rectum and malignancy  cannot be excluded. Correlation to colonoscopy is recommended for  further evaluation. Adenopathy identified throughout the  retroperitoneum. Findings concerning for metastatic disease.  3. Negative for pneumonia at the lung bases.  4. Moderate dilatation seen of the right renal collecting system to the  UVJ where there is no evidence of obstruction or obstructing lesion.  Contrast seen to the bladder.     DICTATED:   04/03/2020  EDITED/ls :   04/03/2020      This report was finalized on 4/3/2020 3:31 PM by Dr. Inés Briscoe MD.           Assessment/Plan   Assessment / Plan     Active Hospital Problems    Diagnosis POA   • **Acute blood loss anemia [D62] Unknown   • Rectal bleeding  "[K62.5] Yes   • Mixed hyperlipidemia [E78.2] Unknown   • Hydronephrosis [N13.30] Unknown   • Essential hypertension [I10] Unknown       Hospital course:  Loni Walker is a 80 y.o. female presenting with abd pain that started 2-3 days ago with intermittent episodes of bright red blood in stool 2 weeks ago. She reports decreased appetite and a 20 lb weight loss over the last 6 months. H&H 7.8/25.8. Typed and cross for transfusion if Hgb<7.0. CT of abd/pelvis concerning for malignancy with metastatic disease.     Acute blood loss anemia/rectal bleeding  20 lb weight loss over 6 months  decreased appetite  - CT of the abd: Abn wall thickening of the rectum and malignancy can not be excluded. Adenopathy identified throughout the retroperioneum, concerning for metastatic disease. Colonoscopy recommended.   - NPO  - Protonix 40 mg IV BID for now, reports \"black clots \"  - Consult gastroenterology  - H&H q 4 hours- Transfuse for Hgb<7.0   - Presently H&H 7.8/25.8  - type and screen for possible transfusion  - hold Plavix    - iron panel, b12, folate    HTN/HLD  - continue atenolol 50 mg bid- hold for SBP<120, HR<60  - patient is normotensive, hold all other BP meds at this time  - hold atorvastatin    Anxiety/Depression  - continue xanax 0.5 mg BID PRN  - continue prozac 20 mg daily    HX of CVA  -PT/OT eval and treat  - hold home plavix d/t GIB    Hydronephrosis, history of urinary retention  - Bladder scan  -Consider urology consult if needed    DVT prophylaxis:  Compression device    CODE STATUS:    Code Status and Medical Interventions:   Ordered at: 04/03/20 1920     Limited Support to NOT Include:    Antiarrhythmic Drugs    Cardioversion/Defibrillation    Intubation     Level Of Support Discussed With:    Patient     Code Status:    No CPR     Medical Interventions (Level of Support Prior to Arrest):    Limited     Admission Status:  I believe this patient meets INPATIENT status due to GIB.  I feel patient’s risk for " adverse outcomes and need for care warrant INPATIENT evaluation and I predict the patient’s care encounter to likely last beyond 2 midnights.      Electronically signed by GABRIELLE Hahn, 04/03/20, 5:13 PM.        Attending   Admission Attestation       I have seen and examined the patient, performing an independent face-to-face diagnostic evaluation with plan of care reviewed and developed with the advanced practice clinician (APC).      Brief Summary Statement:   Loni Walker is a 80 y.o. female with a PMH significant for chronic urinary retention, history of stroke who presents to the ED due to GI bleed. Patient states that for the past 2 weeks she has had recurrent episodes of rectal bleeding.  She describes blood is been bright red with occasional passing of black clots.  She has complaints of mild abdominal cramping which is relieved with bowel movements.  She has had 20 pound unintentional weight loss over the past several months.  She denies nausea vomiting but reports alternating constipation and diarrhea.  She denies cough, shortness of breath, fever, known exposure to COVID-19.      Remainder of detailed HPI is as noted by APC and has been reviewed and/or edited by me for completeness.    Attending Physical Exam:  Constitutional: Awake, alert  Eyes: PERRLA, sclerae anicteric, no conjunctival injection  HENT: NCAT, mucous membranes moist  Neck: Supple, no thyromegaly, no lymphadenopathy, trachea midline  Respiratory: Clear to auscultation bilaterally, nonlabored respirations   Cardiovascular: RRR, no murmurs, rubs, or gallops, palpable pedal pulses bilaterally  Gastrointestinal: Positive bowel sounds, soft, nontender, nondistended  Musculoskeletal: No bilateral ankle edema, no clubbing or cyanosis to extremities  Psychiatric: Appropriate affect, cooperative  Neurologic: Oriented x 3, strength symmetric in all extremities, Cranial Nerves grossly intact to confrontation, speech clear  Skin: No  lisa      Brief Assessment/Plan :  See detailed assessment and plan developed with APC which I have reviewed and/or edited for completeness.    Electronically signed by Griselda Smith DO, 04/03/20, 8:00 PM.

## 2020-04-03 NOTE — PROGRESS NOTES
Discharge Planning Assessment  [facility]     Patient Name: Loni Walker  MRN: [id number]  Today's Date: 4/3/2020    Admit Date: 4/3/2020    Discharge Needs Assessment     Row Name 04/03/20 1806       Living Environment    Lives With  alone;other (see comments) [facility]    Current Living Arrangements  other (see comments) [facility]    Primary Care Provided by  other (see comments) [facility] staff    Provides Primary Care For  no one    Family Caregiver if Needed  other (see comments);child(rolando), adult Facility    Family Caregiver Names  Maribel - daughter    Quality of Family Relationships  helpful;involved;supportive    Able to Return to Prior Arrangements  yes       Resource/Environmental Concerns    Resource/Environmental Concerns  none       Transition Planning    Patient/Family Anticipates Transition to  long term care facility    Transportation Anticipated  family or friend will provide       Discharge Needs Assessment    Readmission Within the Last 30 Days  no previous admission in last 30 days    Concerns to be Addressed  denies needs/concerns at this time    Equipment Currently Used at Home  wheelchair        Discharge Plan     Row Name 04/03/20 1827       Plan    Plan  [facility] - intermediate care    Plan Comments  CM spoke with patient at bedside. Patient states she resides at [facility] in an intermediate care bed x 2 years. Patient states she is wheelchair dependent. CM attemped to call [facility], no answer at facility. CM will continue to follow.     Final Discharge Disposition Code  30 - still a patient                  Demographic Summary     Row Name 04/03/20 1802       General Information    Admission Type  observation    Arrived From  long term care    Required Notices Provided  Observation Status Notice    Referral Source  emergency department    Reason for Consult  discharge planning    Preferred Language  English     Used During This Interaction  no    General Information  Comments  PCP: Nursing home        Contact Information    Contact Information Comments  Maribel López - daughter        Functional Status     Row Name 04/03/20 1803       Functional Status    Usual Activity Tolerance  fair    Current Activity Tolerance  poor       Functional Status, IADL    Medications  completely dependent    Meal Preparation  completely dependent    Housekeeping  completely dependent    Laundry  completely dependent    Shopping  completely dependent       Mental Status    General Appearance WDL  WDL                Lydia Duncan

## 2020-04-04 LAB
ALBUMIN SERPL-MCNC: 3.8 G/DL (ref 3.5–5.2)
ALBUMIN/GLOB SERPL: 1.1 G/DL
ALP SERPL-CCNC: 70 U/L (ref 39–117)
ALT SERPL W P-5'-P-CCNC: 6 U/L (ref 1–33)
ANION GAP SERPL CALCULATED.3IONS-SCNC: 11 MMOL/L (ref 5–15)
AST SERPL-CCNC: 14 U/L (ref 1–32)
BILIRUB SERPL-MCNC: 0.6 MG/DL (ref 0.2–1.2)
BUN BLD-MCNC: 8 MG/DL (ref 8–23)
BUN/CREAT SERPL: 13.3 (ref 7–25)
CALCIUM SPEC-SCNC: 9.2 MG/DL (ref 8.6–10.5)
CHLORIDE SERPL-SCNC: 102 MMOL/L (ref 98–107)
CO2 SERPL-SCNC: 26 MMOL/L (ref 22–29)
CREAT BLD-MCNC: 0.6 MG/DL (ref 0.57–1)
DEPRECATED RDW RBC AUTO: 51.9 FL (ref 37–54)
ERYTHROCYTE [DISTWIDTH] IN BLOOD BY AUTOMATED COUNT: 16.1 % (ref 12.3–15.4)
FOLATE SERPL-MCNC: >20 NG/ML (ref 4.78–24.2)
GFR SERPL CREATININE-BSD FRML MDRD: 96 ML/MIN/1.73
GLOBULIN UR ELPH-MCNC: 3.6 GM/DL
GLUCOSE BLD-MCNC: 116 MG/DL (ref 65–99)
GLUCOSE BLDC GLUCOMTR-MCNC: 119 MG/DL (ref 70–130)
HCT VFR BLD AUTO: 22 % (ref 34–46.6)
HCT VFR BLD AUTO: 31.8 % (ref 34–46.6)
HCT VFR BLD AUTO: 32.2 % (ref 34–46.6)
HGB BLD-MCNC: 10.1 G/DL (ref 12–15.9)
HGB BLD-MCNC: 10.2 G/DL (ref 12–15.9)
HGB BLD-MCNC: 6.6 G/DL (ref 12–15.9)
MCH RBC QN AUTO: 28.1 PG (ref 26.6–33)
MCHC RBC AUTO-ENTMCNC: 31.8 G/DL (ref 31.5–35.7)
MCV RBC AUTO: 88.6 FL (ref 79–97)
PLATELET # BLD AUTO: 279 10*3/MM3 (ref 140–450)
PMV BLD AUTO: 10.7 FL (ref 6–12)
POTASSIUM BLD-SCNC: 4.1 MMOL/L (ref 3.5–5.2)
PROT SERPL-MCNC: 7.4 G/DL (ref 6–8.5)
RBC # BLD AUTO: 3.59 10*6/MM3 (ref 3.77–5.28)
SODIUM BLD-SCNC: 139 MMOL/L (ref 136–145)
VIT B12 BLD-MCNC: 829 PG/ML (ref 211–946)
WBC NRBC COR # BLD: 9.97 10*3/MM3 (ref 3.4–10.8)

## 2020-04-04 PROCEDURE — 85018 HEMOGLOBIN: CPT | Performed by: INTERNAL MEDICINE

## 2020-04-04 PROCEDURE — 99233 SBSQ HOSP IP/OBS HIGH 50: CPT | Performed by: FAMILY MEDICINE

## 2020-04-04 PROCEDURE — P9016 RBC LEUKOCYTES REDUCED: HCPCS

## 2020-04-04 PROCEDURE — 85014 HEMATOCRIT: CPT | Performed by: INTERNAL MEDICINE

## 2020-04-04 PROCEDURE — 85018 HEMOGLOBIN: CPT | Performed by: FAMILY MEDICINE

## 2020-04-04 PROCEDURE — 80053 COMPREHEN METABOLIC PANEL: CPT | Performed by: NURSE PRACTITIONER

## 2020-04-04 PROCEDURE — 85027 COMPLETE CBC AUTOMATED: CPT | Performed by: FAMILY MEDICINE

## 2020-04-04 PROCEDURE — 82962 GLUCOSE BLOOD TEST: CPT

## 2020-04-04 PROCEDURE — 97166 OT EVAL MOD COMPLEX 45 MIN: CPT

## 2020-04-04 PROCEDURE — 97162 PT EVAL MOD COMPLEX 30 MIN: CPT

## 2020-04-04 PROCEDURE — 25010000002 NA FERRIC GLUC CPLX PER 12.5 MG: Performed by: FAMILY MEDICINE

## 2020-04-04 PROCEDURE — 85014 HEMATOCRIT: CPT | Performed by: FAMILY MEDICINE

## 2020-04-04 PROCEDURE — 99222 1ST HOSP IP/OBS MODERATE 55: CPT | Performed by: INTERNAL MEDICINE

## 2020-04-04 PROCEDURE — 86900 BLOOD TYPING SEROLOGIC ABO: CPT

## 2020-04-04 PROCEDURE — 36430 TRANSFUSION BLD/BLD COMPNT: CPT

## 2020-04-04 RX ORDER — ACETAMINOPHEN 325 MG/1
650 TABLET ORAL EVERY 6 HOURS PRN
Status: DISCONTINUED | OUTPATIENT
Start: 2020-04-04 | End: 2020-04-08 | Stop reason: HOSPADM

## 2020-04-04 RX ORDER — FERROUS SULFATE 220 (44)/5
7.4 SOLUTION, ORAL ORAL 2 TIMES DAILY
COMMUNITY
End: 2020-04-08 | Stop reason: HOSPADM

## 2020-04-04 RX ORDER — ATORVASTATIN CALCIUM 20 MG/1
20 TABLET, FILM COATED ORAL DAILY
COMMUNITY

## 2020-04-04 RX ORDER — DILTIAZEM HYDROCHLORIDE 120 MG/1
120 TABLET, FILM COATED ORAL 2 TIMES DAILY
COMMUNITY
End: 2020-04-08 | Stop reason: HOSPADM

## 2020-04-04 RX ORDER — PEG-3350, SODIUM SULFATE, SODIUM CHLORIDE, POTASSIUM CHLORIDE, SODIUM ASCORBATE AND ASCORBIC ACID 7.5-2.691G
1000 KIT ORAL
Status: COMPLETED | OUTPATIENT
Start: 2020-04-04 | End: 2020-04-04

## 2020-04-04 RX ORDER — ALPRAZOLAM 0.25 MG/1
0.25 TABLET ORAL 3 TIMES DAILY PRN
Status: DISCONTINUED | OUTPATIENT
Start: 2020-04-04 | End: 2020-04-08 | Stop reason: HOSPADM

## 2020-04-04 RX ORDER — PEG-3350, SODIUM SULFATE, SODIUM CHLORIDE, POTASSIUM CHLORIDE, SODIUM ASCORBATE AND ASCORBIC ACID 7.5-2.691G
1000 KIT ORAL
Status: COMPLETED | OUTPATIENT
Start: 2020-04-05 | End: 2020-04-05

## 2020-04-04 RX ORDER — OMEPRAZOLE 40 MG/1
40 CAPSULE, DELAYED RELEASE ORAL NIGHTLY
COMMUNITY
End: 2020-04-08 | Stop reason: HOSPADM

## 2020-04-04 RX ADMIN — ATENOLOL 50 MG: 50 TABLET ORAL at 21:00

## 2020-04-04 RX ADMIN — BETHANECHOL CHLORIDE 10 MG: 10 TABLET ORAL at 08:54

## 2020-04-04 RX ADMIN — ALPRAZOLAM 0.25 MG: 0.25 TABLET ORAL at 14:09

## 2020-04-04 RX ADMIN — POLYETHYLENE GLYCOL 3350, SODIUM SULFATE, SODIUM CHLORIDE, POTASSIUM CHLORIDE, ASCORBIC ACID, SODIUM ASCORBATE 1000 ML: KIT at 17:35

## 2020-04-04 RX ADMIN — ATENOLOL 50 MG: 50 TABLET ORAL at 08:51

## 2020-04-04 RX ADMIN — ALPRAZOLAM 0.25 MG: 0.25 TABLET ORAL at 03:28

## 2020-04-04 RX ADMIN — SODIUM CHLORIDE, PRESERVATIVE FREE 10 ML: 5 INJECTION INTRAVENOUS at 08:52

## 2020-04-04 RX ADMIN — FLUOXETINE HYDROCHLORIDE 20 MG: 20 CAPSULE ORAL at 08:51

## 2020-04-04 RX ADMIN — RIVASTIGMINE TRANSDERMAL SYSTEM 1 PATCH: 4.6 PATCH, EXTENDED RELEASE TRANSDERMAL at 08:49

## 2020-04-04 RX ADMIN — ALPRAZOLAM 0.25 MG: 0.25 TABLET ORAL at 23:22

## 2020-04-04 RX ADMIN — PANTOPRAZOLE SODIUM 40 MG: 40 INJECTION, POWDER, FOR SOLUTION INTRAVENOUS at 17:35

## 2020-04-04 RX ADMIN — BETHANECHOL CHLORIDE 10 MG: 10 TABLET ORAL at 21:01

## 2020-04-04 RX ADMIN — PANTOPRAZOLE SODIUM 40 MG: 40 INJECTION, POWDER, FOR SOLUTION INTRAVENOUS at 08:52

## 2020-04-04 RX ADMIN — ACETAMINOPHEN 650 MG: 325 TABLET, FILM COATED ORAL at 14:09

## 2020-04-04 RX ADMIN — SODIUM CHLORIDE 125 MG: 9 INJECTION, SOLUTION INTRAVENOUS at 13:16

## 2020-04-04 RX ADMIN — BETHANECHOL CHLORIDE 10 MG: 10 TABLET ORAL at 16:41

## 2020-04-04 RX ADMIN — SODIUM CHLORIDE 50 ML/HR: 9 INJECTION, SOLUTION INTRAVENOUS at 21:00

## 2020-04-04 NOTE — CONSULTS
[facility]    Referring Provider: Kyle Hawley MD    Primary Care Provider: Provider, No Known    Reason for Consultation: GI bleed    Chief complaint : Rectal bleeding    History of present illness:  Loni Walker is a 80 y.o. female who is admitted with rectal bleeding.  Sudden onset of bright red blood.  States she had intermittent episodes of bright red blood per rectum on the last 2 weeks.  Has been crampy abdominal pain.  No nausea vomiting.  Has lost some weight.  No history of NSAIDs.  Unsure of her last colonoscopy.    Allergies:  Hydrocodone; Oxycodone; Penicillins; Sulfa antibiotics; Doxycycline; and Lyrica [pregabalin]    Scheduled Meds:    atenolol 50 mg Oral Q12H   bethanechol 10 mg Oral TID   ferric gluconate 125 mg Intravenous Daily   FLUoxetine 20 mg Oral Daily   pantoprazole 40 mg Intravenous BID AC   rivastigmine 1 patch Transdermal Daily   sodium chloride 10 mL Intravenous Q12H        Infusions:    sodium chloride 50 mL/hr Last Rate: Stopped (04/04/20 0220)       PRN Meds:  •  ALPRAZolam  •  hydrALAZINE  •  ondansetron  •  sodium chloride  •  sodium chloride    Home Meds:  Medications Prior to Admission   Medication Sig Dispense Refill Last Dose   • acetaminophen (TYLENOL) 500 MG tablet Take 500 mg by mouth Every 4 (Four) Hours As Needed for Mild Pain  (as needed for pain).      • ALPRAZolam (XANAX) 0.5 MG tablet Take 0.5 tablets by mouth 2 (Two) Times a Day As Needed for Anxiety. (Patient taking differently: Take 0.25 mg by mouth 3 (Three) Times a Day.) 3 tablet 0    • amLODIPine (NORVASC) 5 MG tablet Take 5 mg by mouth Daily.      • atorvastatin (LIPITOR) 20 MG tablet Take 20 mg by mouth Daily.      • busPIRone (BUSPAR) 5 MG tablet Take 5 mg by mouth 2 (Two) Times a Day.      • Cholecalciferol (VITAMIN D3) 125 MCG (5000 UT) capsule capsule Take 2,000 Units by mouth Daily.      • cloNIDine (CATAPRES) 0.1 MG tablet Take 0.1 mg by mouth 2 (Two) Times a Day. As needed of SBP> 180       • clopidogrel (PLAVIX) 75 MG tablet Take 75 mg by mouth Daily.      • dilTIAZem (CARDIZEM) 120 MG tablet Take 120 mg by mouth 2 (Two) Times a Day.      • diltiaZEM CD (CARDIZEM CD) 240 MG 24 hr capsule Take 1 capsule by mouth Every Night.      • dimethicone (AVEENO) 1.3 % lotion lotion Apply 1 application topically to the appropriate area as directed Every 1 (One) Hour As Needed (apply to bilateral buttocks topically as needed for skin barrier).      • divalproex (DEPAKOTE) 250 MG 24 hr tablet Take 125 mg by mouth 2 (Two) Times a Day.      • doxazosin (CARDURA) 2 MG tablet Take 2 mg by mouth Every Night.      • ferrous sulfate 220 (44 Fe) MG/5ML liquid liquid Take 7.4 mL by mouth 2 (Two) Times a Day.      • ferrous sulfate 325 (65 FE) MG tablet Take 325 mg by mouth 2 (Two) Times a Day.      • HYDROcodone-acetaminophen (NORCO)  MG per tablet Take 1 tablet by mouth Every 6 (Six) Hours As Needed for Moderate Pain  (as needed for pain).      • hydrocortisone 2.5 % cream Apply 1 application topically to the appropriate area as directed 2 (Two) Times a Day. Rectally bid for hemorroids      • loperamide (IMODIUM) 2 MG capsule Take 2 mg by mouth Every 6 (Six) Hours As Needed for Diarrhea (as needed for diarrhea).      • magnesium oxide (MAGOX) 400 (241.3 MG) MG tablet tablet Take 400 mg by mouth 2 (Two) Times a Day.      • melatonin 3 MG tablet Take 3 mg by mouth Every Night.      • Multiple Vitamins-Minerals (MULTIVITAMIN PO) Take 1 tablet by mouth Daily.      • omeprazole (priLOSEC) 40 MG capsule Take 40 mg by mouth Every Night.      • ondansetron (ZOFRAN) 4 MG tablet Take 4 mg by mouth Every 6 (Six) Hours As Needed for Nausea or Vomiting.      • oxybutynin (DITROPAN) 5 MG tablet Take 5 mg by mouth 3 (Three) Times a Day.      • polyethylene glycol (MIRALAX) packet Take 17 g by mouth Daily.      • rivastigmine (EXELON) 4.6 MG/24HR patch Place 1 patch on the skin Daily.      • senna (senna) 8.6 MG tablet Take 1  "tablet by mouth Daily.      • sertraline (ZOLOFT) 100 MG tablet Take 100 mg by mouth Daily.      • traZODone (DESYREL) 50 MG tablet Take 50 mg by mouth Every Night.      • vitamin C (ASCORBIC ACID) 250 MG tablet Take 250 mg by mouth Daily.          ROS: Review of Systems   Constitutional: Positive for chills and unexpected weight change. Negative for fever.        Sneezing   Respiratory: Negative for shortness of breath.    Cardiovascular: Negative for chest pain.   Gastrointestinal: Positive for abdominal pain and blood in stool.       PAST MED HX: Pt  has a past medical history of Anxiety, Arthritis, Bipolar disorder (CMS/HCC), CAD (coronary artery disease), Carotid stenosis, Cataracts, bilateral, Contracture of ankle, right, Coronary artery disease, CVA (cerebral vascular accident) (CMS/HCC), Dementia with behavioral disturbance (CMS/HCC), Depression, Dysphasia, Generalized weakness, GERD (gastroesophageal reflux disease), migraines, Hyperlipidemia, Hypertension, Insomnia, Osteoarthritis, and Overactive bladder.  PAST SURG HX: Pt  has a past surgical history that includes Cholecystectomy; Hysterectomy; and Abdominal surgery.  FAM HX: family history includes Arthritis in her mother; Diabetes in her father; Early death in her mother; Heart disease in her father; Hyperlipidemia in her father; Hypertension in her daughter and father.  SOC HX: Pt  reports that she quit smoking about 3 years ago. She has a 15.00 pack-year smoking history. She has never used smokeless tobacco. She reports that she does not drink alcohol or use drugs.    /65   Pulse 88   Temp 98.7 °F (37.1 °C) (Oral)   Resp 16   Ht 149.9 cm (59\")   Wt 55.5 kg (122 lb 6.4 oz)   LMP  (LMP Unknown) Comment: Post Menopausal  SpO2 93%   BMI 24.72 kg/m²     Physical Exam  Wt Readings from Last 3 Encounters:   04/04/20 55.5 kg (122 lb 6.4 oz)   03/13/17 59 kg (130 lb 1.1 oz)   03/05/17 59 kg (130 lb)   ,body mass index is 24.72 " kg/m².    General Well developed; well nourished; no acute distress.   ENT edentulous above.  Oral mucosa pink & moist without thrush or lesions.    Neck Neck supple; trachea midline. No thyromegaly  Resp CTA; no rhonchi, rales, or wheezes.  Respiration effort normal  CV RRR; ; no M/R/G. No lower extremity edema  GI Abd soft, mild diffuse tenderness, ND, normal active bowel sounds.  No HSM.  No abd hernia  Skin No rash; no lesions; no bruises.  Skin turgor normal  Musc No clubbing; no cyanosis.    Psych Oriented to time, place, and person.  Appropriate affect      Results Review:   I reviewed the patient's new clinical results.  I reviewed the patient's new imaging results and agree with the interpretation.    Lab Results   Component Value Date    WBC 9.97 04/04/2020    HGB 10.1 (L) 04/04/2020    HCT 31.8 (L) 04/04/2020    MCV 88.6 04/04/2020     04/04/2020       Lab Results   Component Value Date    GLUCOSE 116 (H) 04/04/2020    BUN 8 04/04/2020    CREATININE 0.60 04/04/2020    EGFRIFNONA 96 04/04/2020    BCR 13.3 04/04/2020    CO2 26.0 04/04/2020    CALCIUM 9.2 04/04/2020    ALBUMIN 3.80 04/04/2020    AST 14 04/04/2020    ALT 6 04/04/2020     CT abdomen pelvis per radiology report:     IMPRESSION:  1. Abnormal wall thickening identified of the rectum and malignancy  cannot be excluded. Correlation to colonoscopy is recommended for  further evaluation. Adenopathy identified throughout the  retroperitoneum. Findings concerning for metastatic disease.  3. Negative for pneumonia at the lung bases.  4. Moderate dilatation seen of the right renal collecting system to the  UVJ where there is no evidence of obstruction or obstructing lesion.  Contrast seen to the bladder.     DICTATED:   04/03/2020  EDITED/ls :   04/03/2020      This report was finalized on 4/3/2020 3:31 PM by Dr. Inés Briscoe MD.    Results for JIMBO RENEE (MRN [id number]) as of 4/4/2020 13:09   Ref. Range 4/3/2020 13:03   Iron Latest  Ref Range: 37 - 145 mcg/dL 17 (L)   Ferritin Latest Ref Range: 13.00 - 150.00 ng/mL 443.50 (H)   Iron Saturation Latest Ref Range: 20 - 50 % 7 (L)   Transferrin Latest Ref Range: 200 - 360 mg/dL 156 (L)   TIBC Latest Ref Range: 298 - 536 mcg/dL 232 (L)   Results for JIMBO RENEE (MRN [id number]) as of 4/4/2020 13:09   Ref. Range 4/3/2020 20:00   Vitamin B-12 Latest Ref Range: 211 - 946 pg/mL 829       ASSESSMENTS/PLANS    1.  Rectal bleeding   2.  Abnormal CT scan worrisome for rectal cancer with adenopathy  3.  Hypertension  4.  History of CVA      CT scan worrisome for lung malignancy.  Will plan colonoscopy for tomorrow.      I discussed the patients findings and my recommendations with patient and nursing staff    Celestine Baer MD  04/04/20  13:20

## 2020-04-04 NOTE — PLAN OF CARE
Problem: Patient Care Overview  Goal: Plan of Care Review  Flowsheets (Taken 4/4/2020 1127)  Plan of Care Reviewed With: patient  Outcome Summary: OT eval complete.  Pt with R residual weakness from prior CVA.  Pt presents with anxiety, weakness, decreased balance and activity tolerance limiting independence with ADLs.  Pt required mod A supine to sit, min a x 2 STS,  mod A x 2 bed to chair transfer using RW.    Pt required supervision to don socks in long sitting.  OT will follow to advance pt toward increased independence with self care.  Recommend return to [facility] with OT services upon d/c.

## 2020-04-04 NOTE — THERAPY EVALUATION
Patient Name: Loni Walker  : 1940    MRN: [id number]                              Today's Date: 2020       Admit Date: 4/3/2020    Visit Dx:     ICD-10-CM ICD-9-CM   1. Rectal bleeding K62.5 569.3   2. Anemia, unspecified type D64.9 285.9   3. Generalized weakness R53.1 780.79     Patient Active Problem List   Diagnosis   • Essential hypertension   • CVA (cerebral vascular accident) (CMS/Formerly [facility])   • Carotid stenosis   • Constipation   • Cerebrovascular accident (CVA) (CMS/Formerly [facility])   • Altered mental status, unspecified   • Hypertensive emergency   • History of urinary retention   • UTI (urinary tract infection)   • Rectal bleeding   • Mixed hyperlipidemia   • Acute blood loss anemia   • Hydronephrosis     Past Medical History:   Diagnosis Date   • Anxiety    • Arthritis    • Bipolar disorder (CMS/Formerly [facility])    • CAD (coronary artery disease)    • Carotid stenosis    • Cataracts, bilateral    • Contracture of ankle, right    • Coronary artery disease    • CVA (cerebral vascular accident) (CMS/Formerly [facility])    • Dementia with behavioral disturbance (CMS/Formerly [facility])    • Depression    • Dysphasia    • Generalized weakness    • GERD (gastroesophageal reflux disease)    • Hx of migraines    • Hyperlipidemia    • Hypertension    • Insomnia    • Osteoarthritis    • Overactive bladder      Past Surgical History:   Procedure Laterality Date   • ABDOMINAL SURGERY     • CHOLECYSTECTOMY     • HYSTERECTOMY       General Information     Row Name 20 1058          PT Evaluation Time/Intention    Document Type  evaluation  -LF     Mode of Treatment  physical therapy  -     Row Name 20 1058          General Information    Patient Profile Reviewed?  yes  -LF     Prior Level of Function  independent:;dressing;mod assist:;transfer;bathing;dependent:;home management;cooking;cleaning;driving;shopping  -LF     Existing Precautions/Restrictions  fall  -LF     Barriers to Rehab  medically complex;previous functional deficit;contractures  -LF      Row Name 04/04/20 1058          Relationship/Environment    Lives With  alone;facility resident;other (see comments) [facility]  -     Row Name 04/04/20 1058          Resource/Environmental Concerns    Current Living Arrangements  residential facility  -     Row Name 04/04/20 1058          Home Main Entrance    Number of Stairs, Main Entrance  none  -     Row Name 04/04/20 1058          Stairs Within Home, Primary    Number of Stairs, Within Home, Primary  none  -     Row Name 04/04/20 1058          Cognitive Assessment/Intervention- PT/OT    Orientation Status (Cognition)  oriented x 4  -     Row Name 04/04/20 1058          Safety Issues, Functional Mobility    Safety Issues Affecting Function (Mobility)  awareness of need for assistance;insight into deficits/self awareness;safety precaution awareness;judgment;problem solving;positioning of assistive device;sequencing abilities  -     Impairments Affecting Function (Mobility)  endurance/activity tolerance;strength;range of motion (ROM);balance;coordination;postural/trunk control;motor control;motor planning  -       User Key  (r) = Recorded By, (t) = Taken By, (c) = Cosigned By    Initials Name Provider Type     Keysha Sorensen, PT Physical Therapist        Mobility     Row Name 04/04/20 1102          Bed Mobility Assessment/Treatment    Bed Mobility Assessment/Treatment  rolling right;scooting/bridging;supine-sit  -     Rolling Right [place] (Bed Mobility)  supervision;verbal cues  -     Scooting/Bridging [place] (Bed Mobility)  moderate assist (50% patient effort);1 person assist;verbal cues;nonverbal cues (demo/gesture)  -     Supine-Sit [place] (Bed Mobility)  moderate assist (50% patient effort);1 person assist;verbal cues;nonverbal cues (demo/gesture)  -     Assistive Device (Bed Mobility)  bed rails;head of bed elevated;draw sheet  -     Row Name 04/04/20 1102          Transfer Assessment/Treatment    Comment  (Transfers)  ModA to scoot to EOB. Pt demonstrates self-limiting behavior and required encouragement for participation with decreased assist.  -LF     Row Name 04/04/20 1102          Bed-Chair Transfer    Bed-Chair [place] (Transfers)  moderate assist (50% patient effort);2 person assist;verbal cues;nonverbal cues (demo/gesture)  -LF     Assistive Device (Bed-Chair Transfers)  walker, front-wheeled  -LF     Row Name 04/04/20 1102          Sit-Stand Transfer    Sit-Stand [place] (Transfers)  minimum assist (75% patient effort);2 person assist;verbal cues;nonverbal cues (demo/gesture)  -LF     Assistive Device (Sit-Stand Transfers)  walker, front-wheeled  -LF     Row Name 04/04/20 1102          Gait/Stairs Assessment/Training    Gait/Stairs Assessment/Training  gait/ambulation independence;gait/ambulation assistive device;distance ambulated;gait pattern;gait deviations  -LF     [place] Level (Gait)  moderate assist (50% patient effort);2 person assist;verbal cues;nonverbal cues (demo/gesture)  -LF     Assistive Device (Gait)  walker, front-wheeled  -LF     Distance in Feet (Gait)  3  -LF     Pattern (Gait)  step-to  -LF     Deviations/Abnormal Patterns (Gait)  base of support, narrow;alan decreased;festinating/shuffling;stride length decreased  -LF     Bilateral Gait Deviations  forward flexed posture;heel strike decreased;weight shift ability decreased  -LF     Right Sided Gait Deviations  foot drop/toe drag;weight shift ability decreased  -LF     Comment (Gait/Stairs)  Right foot drop and contracture, requiring assist and encouragement to weight shift and step with RW and LE blocked.  Pt reports fear of falling and requires encouragement for participation.  -LF       User Key  (r) = Recorded By, (t) = Taken By, (c) = Cosigned By    Initials Name Provider Type    Keysha Dinero PT Physical Therapist        Obj/Interventions     Row Name 04/04/20 1109          General ROM    GENERAL ROM  COMMENTS  Right LE AROM limited due to strength.  Right hip, knee, and ankle PF PROM WFL.  Right ankle DF PROM lacking grossly 10-15 degrees.  Left LE A/PROM grossly WFL.  -     Row Name 04/04/20 1109          MMT (Manual Muscle Testing)    General MMT Comments  Right hip and knee strength grossly 2/5, right ankle strength grossly 1/5.  Left LE strength grossly 3/5 throughout.   -     Row Name 04/04/20 1109          Static Sitting Balance    Level of [place] (Unsupported Sitting, Static Balance)  supervision;1 person assist  -     Sitting Position (Unsupported Sitting, Static Balance)  sitting on edge of bed;sitting in chair  -     Row Name 04/04/20 1109          Dynamic Sitting Balance    Level of [place], Reaches Outside Midline (Sitting, Dynamic Balance)  contact guard assist;1 person assist  -     Sitting Position, Reaches Outside Midline (Sitting, Dynamic Balance)  sitting on edge of bed;sitting in chair  -     Row Name 04/04/20 1109          Static Standing Balance    Level of [place] (Supported Standing, Static Balance)  minimal assist, 75% patient effort;2 person assist  -     Time Able to Maintain Position (Supported Standing, Static Balance)  45 to 60 seconds  -     Assistive Device Utilized (Supported Standing, Static Balance)  walker, rolling  -     Row Name 04/04/20 1109          Dynamic Standing Balance    Level of [place], Reaches Outside Midline (Standing, Dynamic Balance)  moderate assist, 50 to 74% patient effort;2 person assist  -LF     Time Able to Maintain Position, Reaches Outside Midline (Standing, Dynamic Balance)  45 to 60 seconds  -     Assistive Device Utilized (Supported Standing, Dynamic Balance)  walker, rolling  -     Row Name 04/04/20 1109          Sensory Assessment/Intervention    Sensory General Assessment  no sensation deficits identified  -       User Key  (r) = Recorded By, (t) = Taken By, (c) = Cosigned By    Initials Name Provider  Type    LF Keysha Sorensen, PT Physical Therapist        Goals/Plan     Row Name 04/04/20 1123          Bed Mobility Goal 1 (PT)    Activity/Assistive Device (Bed Mobility Goal 1, PT)  bed mobility activities, all  -LF     [place] Level/Cues Needed (Bed Mobility Goal 1, PT)  conditional independence;1 person assist  -LF     Time Frame (Bed Mobility Goal 1, PT)  2 weeks  -LF     Progress/Outcomes (Bed Mobility Goal 1, PT)  goal ongoing  -LF     Row Name 04/04/20 1123          Transfer Goal 1 (PT)    Activity/Assistive Device (Transfer Goal 1, PT)  transfers, all;walker, rolling  -LF     [place] Level/Cues Needed (Transfer Goal 1, PT)  minimum assist (75% or more patient effort);1 person assist  -LF     Time Frame (Transfer Goal 1, PT)  2 weeks  -LF     Row Name 04/04/20 1123          Gait Training Goal 1 (PT)    Activity/Assistive Device (Gait Training Goal 1, PT)  gait (walking locomotion);assistive device use;walker, rolling  -LF     [place] Level (Gait Training Goal 1, PT)  minimum assist (75% or more patient effort);1 person assist  -LF     Distance (Gait Goal 1, PT)  5  -LF     Time Frame (Gait Training Goal 1, PT)  2 weeks  -LF     Row Name 04/04/20 1123          Patient Education Goal (PT)    Activity (Patient Education Goal, PT)  PT POC/HEP  -LF     [place]/Cues/Accuracy (Memory Goal 2, PT)  independent;demonstrates adequately  -LF     Time Frame (Patient Education Goal, PT)  2 weeks  -LF     Progress/Outcome (Patient Education Goal, PT)  goal ongoing  -LF       User Key  (r) = Recorded By, (t) = Taken By, (c) = Cosigned By    Initials Name Provider Type    LF Keysha Sorensen, PT Physical Therapist        Clinical Impression     Row Name 04/04/20 1114          Pain Assessment    Additional Documentation  Pain Scale: Numbers Pre/Post-Treatment (Group)  -LF     Row Name 04/04/20 1114          Pain Scale: Numbers Pre/Post-Treatment    Pain Scale: Numbers, Pretreatment  0/10 - no pain  -LF      Pain Scale: Numbers, Post-Treatment  0/10 - no pain  -LF     Row Name 04/04/20 1114          Plan of Care Review    Plan of Care Reviewed With  patient  -LF     Outcome Summary  PT eval completed.  Pt presents with decreased strength, ROM, and balance limiting mobility.  Pt has right residual weakness from previous CVA.  Pt also demonstrates fear of falling and self-limiting behavior, requiring encouragement for participation in mobility with decreased assist.    -     Row Name 04/04/20 1114          Physical Therapy Clinical Impression    Patient/Family Goals Statement (PT Clinical Impression)  Return to PLOF.  -LF     Criteria for Skilled Interventions Met (PT Clinical Impression)  yes;treatment indicated  -LF     Rehab Potential (PT Clinical Summary)  good, to achieve stated therapy goals  -LF     Row Name 04/04/20 1114          Vital Signs    Pre Systolic BP Rehab  150  -LF     Pre Treatment Diastolic BP  59  -LF     Intra Systolic BP Rehab  133  -LF     Intra Treatment Diastolic BP  65  -LF     Post Systolic BP Rehab  149  -LF     Post Treatment Diastolic BP  57  -LF     Pretreatment Heart Rate (beats/min)  88  -LF     Posttreatment Heart Rate (beats/min)  91  -LF     Pre SpO2 (%)  93  -LF     O2 Delivery Pre Treatment  supplemental O2  -LF     O2 Delivery Intra Treatment  room air  -LF     Post SpO2 (%)  95  -LF     O2 Delivery Post Treatment  room air  -LF     Pre Patient Position  Supine  -LF     Intra Patient Position  Standing  -LF     Post Patient Position  Sitting  -LF     Row Name 04/04/20 1114          Positioning and Restraints    Pre-Treatment Position  in bed  -LF     Post Treatment Position  chair  -LF     In Chair  reclined;legs elevated;waffle cushion;on mechanical lift sling;exit alarm on;call light within reach;encouraged to call for assist;notified nsg  -LF       User Key  (r) = Recorded By, (t) = Taken By, (c) = Cosigned By    Initials Name Provider Type    LF Franchesca, Keysha THOMPSON, PT  Physical Therapist        Outcome Measures     Row Name 04/04/20 1125          How much help from another person do you currently need...    Turning from your back to your side while in flat bed without using bedrails?  3  -LF     Moving from lying on back to sitting on the side of a flat bed without bedrails?  2  -LF     Moving to and from a bed to a chair (including a wheelchair)?  2  -LF     Standing up from a chair using your arms (e.g., wheelchair, bedside chair)?  3  -LF     Climbing 3-5 steps with a railing?  1  -LF     To walk in hospital room?  2  -LF     AM-PAC 6 Clicks Score (PT)  13  -LF     Row Name 04/04/20 1125          Functional Assessment    Outcome Measure Options  AM-PAC 6 Clicks Basic Mobility (PT)  -       User Key  (r) = Recorded By, (t) = Taken By, (c) = Cosigned By    Initials Name Provider Type    LF Franchesca, Keysha THOMPSON, PT Physical Therapist          PT Recommendation and Plan  Planned Therapy Interventions (PT Eval): balance training, bed mobility training, gait training, home exercise program, joint mobilization, lumbar stabilization, manual therapy techniques, motor coordination training, neuromuscular re-education, patient/family education, postural re-education, ROM (range of motion), strengthening, transfer training  Outcome Summary/Treatment Plan (PT)  Anticipated Discharge Disposition (PT): skilled nursing facility  Plan of Care Reviewed With: patient  Outcome Summary: PT eval completed.  Pt presents with decreased strength, ROM, and balance limiting mobility.  Pt has right residual weakness from previous CVA.  Pt also demonstrates fear of falling and self-limiting behavior, requiring encouragement for participation in mobility with decreased assist.       Time Calculation:   PT Charges     Row Name 04/04/20 0857             Time Calculation    Start Time  0857  -LF      PT Received On  04/04/20  -      PT Goal Re-Cert Due Date  04/14/20  -        User Key  (r) = Recorded By,  (t) = Taken By, (c) = Cosigned By    Initials Name Provider Type    LF Keysha Sorensen, PT Physical Therapist        Therapy Charges for Today     Code Description Service Date Service Provider Modifiers Qty    16743612886 HC PT EVAL MOD COMPLEXITY 4 4/4/2020 Keysha Sorensen, PT GP 1          PT G-Codes  Outcome Measure Options: AM-PAC 6 Clicks Basic Mobility (PT)  AM-PAC 6 Clicks Score (PT): 13  AM-PAC 6 Clicks Score (OT): 17    Keysha Sorensen, PT  4/4/2020

## 2020-04-04 NOTE — PLAN OF CARE
Problem: Patient Care Overview  Goal: Plan of Care Review  Outcome: Ongoing (interventions implemented as appropriate)  Flowsheets (Taken 4/4/2020 1114)  Plan of Care Reviewed With: patient  Outcome Summary: PT eval completed.  Pt presents with decreased strength, ROM, and balance limiting mobility.  Pt has right residual weakness from previous CVA.  Pt also demonstrates fear of falling and self-limiting behavior, requiring encouragement for participation in mobility with decreased assist.

## 2020-04-04 NOTE — PROGRESS NOTES
[facility] Medicine Services  PROGRESS NOTE    Patient Name: Loni Walker  : 1940  MRN: [id number]    Date of Admission: 4/3/2020  Primary Care Physician: Provider, No Known    Subjective   Subjective     CC:  F/U GIB    HPI:  Pt seen and examined. Nursing notes reviewed. No acute events overnight. Pt states she feels weak this morning. She was ordered 2 units of PRBCs overnight due to anemia. Plan for GI to evaluate this AM.    Review of Systems  Gen- No fevers, chills  CV- No chest pain, palpitations  Resp- No cough, dyspnea  GI- No N/V/D, abd pain    Objective   Objective     Vital Signs:   Temp:  [98.1 °F (36.7 °C)-99.1 °F (37.3 °C)] 98.6 °F (37 °C)  Heart Rate:  [56-86] 86  Resp:  [18-20] 18  BP: (102-150)/(40-72) 150/59        Physical Exam:  Constitutional: No acute distress, awake, alert  HENT: NCAT, mucous membranes moist  Respiratory: Clear to auscultation bilaterally, respiratory effort normal   Cardiovascular: RRR, no murmurs, rubs, or gallops, palpable pedal pulses bilaterally  Gastrointestinal: Positive bowel sounds, soft, nontender, nondistended  Musculoskeletal: No bilateral ankle edema  Psychiatric: Appropriate affect, cooperative  Neurologic: Oriented x 3, strength symmetric in all extremities, Cranial Nerves grossly intact to confrontation, speech clear  Skin: No rashes    Results Reviewed:  Results from last 7 days   Lab Units 20  0040 20  1304   WBC 10*3/mm3  --   --  7.67   HEMOGLOBIN g/dL 6.6* 7.2* 7.8*   HEMATOCRIT % 22.0* 23.4* 25.8*   PLATELETS 10*3/mm3  --   --  284   INR   --   --  1.07     Results from last 7 days   Lab Units 20  1303   SODIUM mmol/L 137   POTASSIUM mmol/L 4.2   CHLORIDE mmol/L 100   CO2 mmol/L 27.0   BUN mg/dL 12   CREATININE mg/dL 0.82   GLUCOSE mg/dL 127*   CALCIUM mg/dL 9.2   ALT (SGPT) U/L 7   AST (SGOT) U/L 13   TROPONIN T ng/mL <0.010   PROBNP pg/mL 486.4     Estimated Creatinine Clearance: 47.1  mL/min (by C-G formula based on SCr of 0.82 mg/dL).    Microbiology Results Abnormal     None          Imaging Results (Last 24 Hours)     Procedure Component Value Units Date/Time    XR Chest 1 View [[id number]] Collected:  04/03/20 1501     Updated:  04/04/20 0926    Narrative:          EXAMINATION: XR CHEST 1 VW - 04/03/2020     INDICATION: GI bleed, weakness.     COMPARISON: 03/06/2017     FINDINGS: Portable chest reveals heart to be enlarged. There is  increased pulmonary vascularity bilaterally. No focal parenchymal  opacification is present. No pleural effusion or pneumothorax. The bony  structures are unremarkable. The pulmonary vascularity is within normal  limits.       Impression:       Prominence of the pulmonary vascularity. Heart is enlarged.  No acute parenchymal disease.     DICTATED:   04/03/2020  EDITED/ls :   04/03/2020      This report was finalized on 4/4/2020 9:23 AM by Dr. Inés Briscoe MD.       CT Abdomen Pelvis With Contrast [[id number]] Collected:  04/03/20 1431     Updated:  04/03/20 1534    Narrative:       EXAMINATION: CT ABDOMEN/PELVIS W CONTRAST - 04/03/2020      INDICATION: Generalized abdominal pain.     TECHNIQUE: Multiple axial CT imaging is obtained of the abdomen and  pelvis following the administration of intravenous contrast.      The radiation dose reduction device was turned on for each scan per the  ALARA (As Low as Reasonably Achievable) protocol.     COMPARISON: None.     FINDINGS: Lung bases are clear. Small retrocrural lymph nodes identified  adjacent the aorta at the diaphragmatic hiatus. No bulky adenopathy.  Liver is homogeneous in appearance. The spleen is unremarkable. The  kidneys and adrenal glands are within normal limits. 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 There is adenopathy identified  throughout the retroperitoneum. The largest left periaortic lymph node  measures 2 cm in  size. Surgical clips seen in the right upper quadrant  from prior cholecystectomy. There is incomplete distention identified of  the stomach. Pancreas is homogeneous in appearance. There is moderate  distention identified of the right kidney. There is dilatation  identified of the right ureter to the right UVJ where there is no  evidence of a stone. The left kidney is unremarkable in appearance.  Vascular calcification seen within the abdominal aorta and iliac  vessels. No free fluid or free air.     PELVIS: Pelvic organs are unremarkable. The pelvic portion of the  gastrointestinal tract reveals abnormal wall thickening seen of the  rectum. A rectal malignancy cannot be excluded. There is no bulky pelvic  adenopathy. No free fluid or free air. Mild distention of the bladder.  No abnormal mass or fluid collections identified. Degenerative changes  seen within the spine and pelvis. Curvature identified the spine with  convexity to the right. Delayed imaging reveals contrast seen in the  renal collecting systems bilaterally as well as within the ureters and  bladder.       Impression:       1. Abnormal wall thickening identified of the rectum and malignancy  cannot be excluded. Correlation to colonoscopy is recommended for  further evaluation. Adenopathy identified throughout the  retroperitoneum. Findings concerning for metastatic disease.  3. Negative for pneumonia at the lung bases.  4. Moderate dilatation seen of the right renal collecting system to the  UVJ where there is no evidence of obstruction or obstructing lesion.  Contrast seen to the bladder.     DICTATED:   04/03/2020  EDITED/ls :   04/03/2020      This report was finalized on 4/3/2020 3:31 PM by Dr. Inés Briscoe MD.                  I have reviewed the medications:  Scheduled Meds:  atenolol 50 mg Oral Q12H   bethanechol 10 mg Oral TID   FLUoxetine 20 mg Oral Daily   pantoprazole 40 mg Intravenous BID AC   rivastigmine 1 patch Transdermal Daily     sodium chloride 10 mL Intravenous Q12H     Continuous Infusions:  sodium chloride 50 mL/hr Last Rate: Stopped (04/04/20 0220)     PRN Meds:.•  ALPRAZolam  •  hydrALAZINE  •  ondansetron  •  sodium chloride  •  sodium chloride    Assessment/Plan   Assessment & Plan     Active Hospital Problems    Diagnosis  POA   • **Acute blood loss anemia [D62]  Unknown   • Rectal bleeding [K62.5]  Yes   • Mixed hyperlipidemia [E78.2]  Unknown   • Hydronephrosis [N13.30]  Unknown   • Essential hypertension [I10]  Unknown      Resolved Hospital Problems   No resolved problems to display.        Brief Hospital Course to date:  Loni Walker is a 80 y.o. female presenting with abd pain that started 2-3 days ago with intermittent episodes of bright red blood in stool 2 weeks ago. She also states that she has had some dark stools. She reports decreased appetite and a 20 lb weight loss over the last 6 months.  CT of abd/pelvis concerning for malignancy with metastatic disease. Pt ordered 2 units of PRBCs.    All problems are new to me today as this is my first encounter with the patient    This patient's problems and plans were partially entered by my partner and updated as appropriate by me 04/04/20.    Acute blood loss anemia/rectal bleeding  20 lb weight loss over 6 months  decreased appetite  - CT of the abd: Abn wall thickening of the rectum and malignancy can not be excluded. Adenopathy identified throughout the retroperioneum, concerning for metastatic disease. Colonoscopy recommended.   - NPO  - Protonix 40 mg IV BID   - GI consulted to see the patient  - H&H q8   - hold Plavix    - B12, folate WNL  - Iron low 17, will order 3 doses of IV iron  -Pt to receive 2 units of PRBCs. Repeat H&H after transfusion.     HTN/HLD  - continue atenolol 50 mg bid  - patient is normotensive, hold all other BP meds at this time  - hold atorvastatin     Anxiety/Depression  - continue xanax 0.5 mg TID PRN  - continue prozac 20 mg daily     HX of  CVA  -PT/OT to eval and treat  - hold home plavix d/t GIB     Hydronephrosis, history of urinary retention  - Bladder scan  -Consider urology consult if needed     DVT prophylaxis:  Mechanical    Disposition: I expect the patient to be discharged pending GI eval    CODE STATUS:   Code Status and Medical Interventions:   Ordered at: 04/03/20 1920     Limited Support to NOT Include:    Antiarrhythmic Drugs    Cardioversion/Defibrillation    Intubation     Level Of Support Discussed With:    Patient     Code Status:    No CPR     Medical Interventions (Level of Support Prior to Arrest):    Limited         Electronically signed by Galilea Campos DO, 04/04/20, 10:05.

## 2020-04-04 NOTE — THERAPY EVALUATION
Acute Care - Occupational Therapy Initial Evaluation  [facility]     Patient Name: Loni Walker  : 1940  MRN: [id number]  Today's Date: 2020     Date of Referral to OT: 20  Referring Physician: GABRIELLE Lewis    Admit Date: 4/3/2020       ICD-10-CM ICD-9-CM   1. Rectal bleeding K62.5 569.3   2. Anemia, unspecified type D64.9 285.9   3. Generalized weakness R53.1 780.79     Patient Active Problem List   Diagnosis   • Essential hypertension   • CVA (cerebral vascular accident) (CMS/Formerly [facility])   • Carotid stenosis   • Constipation   • Cerebrovascular accident (CVA) (CMS/Formerly [facility])   • Altered mental status, unspecified   • Hypertensive emergency   • History of urinary retention   • UTI (urinary tract infection)   • Rectal bleeding   • Mixed hyperlipidemia   • Acute blood loss anemia   • Hydronephrosis     Past Medical History:   Diagnosis Date   • Anxiety    • Arthritis    • Bipolar disorder (CMS/Formerly [facility])    • CAD (coronary artery disease)    • Carotid stenosis    • Cataracts, bilateral    • Contracture of ankle, right    • Coronary artery disease    • CVA (cerebral vascular accident) (CMS/Formerly [facility])    • Dementia with behavioral disturbance (CMS/Formerly [facility])    • Depression    • Dysphasia    • Generalized weakness    • GERD (gastroesophageal reflux disease)    • Hx of migraines    • Hyperlipidemia    • Hypertension    • Insomnia    • Osteoarthritis    • Overactive bladder      Past Surgical History:   Procedure Laterality Date   • ABDOMINAL SURGERY     • CHOLECYSTECTOMY     • HYSTERECTOMY            OT ASSESSMENT FLOWSHEET (last 12 hours)      Occupational Therapy Evaluation     Row Name 20 0854                   OT Evaluation Time/Intention    Document Type  evaluation  -AC        Mode of Treatment  occupational therapy  -AC        Patient Effort  good  -AC           General Information    Patient Profile Reviewed?  yes  -AC        Referring Physician  GABRIELLE Lewis  -AC        Prior Level of Function   independent:;feeding;grooming;dressing;min assist:;transfer;bathing pt reports ambulating ~ 4 ft prior w/ RW  -AC        Equipment Currently Used at Home  walker, rolling;wheelchair  -        Pertinent History of Current Functional Problem  Pt admit with abdominal pain  -        Existing Precautions/Restrictions  fall R sided weakness  -        Limitations/Impairments  -- anxiety  -        Barriers to Rehab  previous functional deficit  -           Relationship/Environment    Lives With  other (see comments) [facility] staff  -           Resource/Environmental Concerns    Current Living Arrangements  residential facility  -           Cognitive Assessment/Interventions    Additional Documentation  Cognitive Assessment/Intervention (Group)  -           Cognitive Assessment/Intervention- PT/OT    Affect/Mental Status (Cognitive)  anxious  -AC        Orientation Status (Cognition)  oriented x 4  -AC        Follows Commands (Cognition)  WFL  -           Safety Issues, Functional Mobility    Safety Issues Affecting Function (Mobility)  insight into deficits/self awareness;judgment;safety precaution awareness;safety precautions follow-through/compliance  -        Impairments Affecting Function (Mobility)  balance;endurance/activity tolerance;strength;postural/trunk control  -           Bed Mobility Assessment/Treatment    Bed Mobility Assessment/Treatment  supine-sit  -        Supine-Sit [place] (Bed Mobility)  moderate assist (50% patient effort)  -        Bed Mobility, Safety Issues  decreased use of arms for pushing/pulling;decreased use of legs for bridging/pushing  -        Assistive Device (Bed Mobility)  bed rails;head of bed elevated  -           Transfer Assessment/Treatment    Transfer Assessment/Treatment  sit-stand transfer;stand-sit transfer;bed-chair transfer  -        Comment (Transfers)  pt with fear of falling, anxiety  -           Bed-Chair Transfer    Bed-Chair  [place] (Transfers)  verbal cues;moderate assist (50% patient effort);2 person assist  -AC        Assistive Device (Bed-Chair Transfers)  walker, front-wheeled  -AC           Sit-Stand Transfer    Sit-Stand [place] (Transfers)  verbal cues;minimum assist (75% patient effort);2 person assist  -AC        Assistive Device (Sit-Stand Transfers)  walker, front-wheeled  -AC           Stand-Sit Transfer    Stand-Sit [place] (Transfers)  verbal cues;minimum assist (75% patient effort);2 person assist  -AC        Assistive Device (Stand-Sit Transfers)  walker, front-wheeled  -AC           ADL Assessment/Intervention    BADL Assessment/Intervention  lower body dressing  -AC           Lower Body Dressing Assessment/Training    Lower Body Dressing [place] Level  doff;don;socks;supervision  -        Lower Body Dressing Position  long sitting  -           BADL Safety/Performance    Impairments, BADL Safety/Performance  balance;endurance/activity tolerance;strength  -AC           General ROM    GENERAL ROM COMMENTS  BUE AROM WFL  -AC           MMT (Manual Muscle Testing)    General MMT Comments  R shld grossly 3+/5, distally 4-/5,  LUE grossly 4/5  -           Sensory Assessment/Intervention    Sensory General Assessment  no sensation deficits identified BUE  -AC        Additional Documentation  Vision Assessment/Intervention (Group)  -           Vision Assessment/Intervention    Visual Impairment/Limitations  WFL  -AC           Positioning and Restraints    Pre-Treatment Position  in bed  -AC        Post Treatment Position  chair  -AC        In Chair  reclined;call light within reach;encouraged to call for assist;exit alarm on;waffle cushion;on mechanical lift sling  -           Pain Assessment    Additional Documentation  Pain Scale: Numbers Pre/Post-Treatment (Group)  -           Pain Scale: Numbers Pre/Post-Treatment    Pre/Post Treatment Pain Comment  reports mild abdominal discomfort  -            Wound 04/03/20 1850 medial coccyx Pressure Injury    Wound - Properties Group Date first assessed: 04/03/20  - Time first assessed: 1850  - Present on Hospital Admission: Y  - Orientation: medial  - Location: coccyx  - Primary Wound Type: Pressure inj  - Stage, Pressure Injury: deep tissue injury  -       Plan of Care Review    Plan of Care Reviewed With  patient  -AC        Outcome Summary  OT eval complete.  Pt with R residaul weakness from prior CVA.  Pt presents with anxiety, weakness, decreased balance and activity tolerance limiting independence with ADLs.  Pt required mod A supine to sit, min a x2 STS,  mod A x2 bed to chair transfer using RW.    Pt required supervision to don socks in long sitting.  OT will follow to advance pt toward increased independence with self care.  Recommend return to [place] with OT services upon d/c.   -AC           Clinical Impression (OT)    Date of Referral to OT  04/03/20  -AC        OT Diagnosis  ADL decline  -AC        Patient/Family Goals Statement (OT Eval)  return to [facility]  -AC        Criteria for Skilled Therapeutic Interventions Met (OT Eval)  yes;treatment indicated  -AC        Rehab Potential (OT Eval)  good, to achieve stated therapy goals  -AC        Therapy Frequency (OT Eval)  daily  -AC        Care Plan Review (OT)  evaluation/treatment results reviewed  -AC        Anticipated Discharge Disposition (OT)  skilled nursing facility  -AC           Vital Signs    Pre Systolic BP Rehab  150  -AC        Pre Treatment Diastolic BP  59  -AC        Intra Systolic BP Rehab  133  -AC        Intra Treatment Diastolic BP  65  -AC        Post Systolic BP Rehab  149  -AC        Post Treatment Diastolic BP  57  -AC        Pretreatment Heart Rate (beats/min)  89  -AC        Posttreatment Heart Rate (beats/min)  85  -AC        Pre SpO2 (%)  93  -AC        O2 Delivery Pre Treatment  supplemental O2  -AC        O2 Delivery Intra Treatment  room air  -AC         Post SpO2 (%)  92  -AC        O2 Delivery Post Treatment  room air  -AC        Pre Patient Position  Supine  -AC        Intra Patient Position  Standing  -AC        Post Patient Position  Sitting  -AC           Planned OT Interventions    Planned Therapy Interventions (OT Eval)  activity tolerance training;BADL retraining;functional balance retraining;occupation/activity based interventions;patient/caregiver education/training;strengthening exercise;transfer/mobility retraining  -AC           OT Goals    Bed Mobility Goal Selection (OT)  bed mobility, OT goal 1  -AC        Transfer Goal Selection (OT)  transfer, OT goal 1  -AC        Dressing Goal Selection (OT)  dressing, OT goal 1  -AC        Toileting Goal Selection (OT)  toileting, OT goal 1  -AC           Bed Mobility Goal 1 (OT)    Activity/Assistive Device (Bed Mobility Goal 1, OT)  sit to supine;supine to sit  -AC        [place] Level/Cues Needed (Bed Mobility Goal 1, OT)  contact guard assist  -AC        Time Frame (Bed Mobility Goal 1, OT)  by discharge  -AC        Progress/Outcomes (Bed Mobility Goal 1, OT)  goal ongoing  -AC           Transfer Goal 1 (OT)    Activity/Assistive Device (Transfer Goal 1, OT)  bed-to-chair/chair-to-bed;toilet;walker, rolling  -AC        [place] Level/Cues Needed (Transfer Goal 1, OT)  minimum assist (75% or more patient effort)  -AC        Time Frame (Transfer Goal 1, OT)  by discharge  -AC        Progress/Outcome (Transfer Goal 1, OT)  goal ongoing  -AC           Dressing Goal 1 (OT)    Activity/Assistive Device (Dressing Goal 1, OT)  lower body dressing don/doff undergarment  -AC        Ray/Cues Needed (Dressing Goal 1, OT)  minimum assist (75% or more patient effort)  -AC        Time Frame (Dressing Goal 1, OT)  by discharge  -AC        Progress/Outcome (Dressing Goal 1, OT)  goal ongoing  -AC           Toileting Goal 1 (OT)    Activity/Device (Toileting Goal 1, OT)  adjust/manage clothing;perform  perineal hygiene;commode, bedside without drop arms  -AC        [place] Level/Cues Needed (Toileting Goal 1, OT)  minimum assist (75% or more patient effort)  -AC        Time Frame (Toileting Goal 1, OT)  by discharge  -AC        Progress/Outcome (Toileting Goal 1, OT)  goal ongoing  -AC           Living Environment    Home Accessibility  wheelchair accessible  -AC          User Key  (r) = Recorded By, (t) = Taken By, (c) = Cosigned By    Initials Name Effective Dates    AC Guillermina Almaguer, OT 06/23/15 -     Nidia Mullins RN 08/22/16 -                OT Recommendation and Plan  Outcome Summary/Treatment Plan (OT)  Anticipated Discharge Disposition (OT): skilled nursing facility  Planned Therapy Interventions (OT Eval): activity tolerance training, BADL retraining, functional balance retraining, occupation/activity based interventions, patient/caregiver education/training, strengthening exercise, transfer/mobility retraining  Therapy Frequency (OT Eval): daily  Plan of Care Review  Plan of Care Reviewed With: patient  Plan of Care Reviewed With: patient  Outcome Summary: OT eval complete.  Pt with R residaul weakness from prior CVA.  Pt presents with anxiety, weakness, decreased balance and activity tolerance limiting independence with ADLs.  Pt required mod A supine to sit, min a x2 STS,  mod A x2 bed to chair transfer using RW.    Pt required supervision to don socks in long sitting.  OT will follow to advance pt toward increased independence with self care.  Recommend return to [place] with OT services upon d/c.     Outcome Measures     Row Name 04/04/20 0854             How much help from another is currently needed...    Putting on and taking off regular lower body clothing?  3  -AC      Bathing (including washing, rinsing, and drying)  2  -AC      Toileting (which includes using toilet bed pan or urinal)  2  -AC      Putting on and taking off regular upper body clothing  3  -AC      Taking care of  personal grooming (such as brushing teeth)  3  -AC      Eating meals  4  -AC      AM-PAC 6 Clicks Score (OT)  17  -AC         Functional Assessment    Outcome Measure Options  AM-PAC 6 Clicks Daily Activity (OT)  -        User Key  (r) = Recorded By, (t) = Taken By, (c) = Cosigned By    Initials Name Provider Type    AC Guillermina Almaguer, OT Occupational Therapist          Time Calculation:   Time Calculation- OT     Row Name 04/04/20 0854             Time Calculation- OT    OT Start Time  0854  -      OT Received On  04/04/20  -      OT Goal Re-Cert Due Date  04/14/20  -        User Key  (r) = Recorded By, (t) = Taken By, (c) = Cosigned By    Initials Name Provider Type     Guillermina Almaguer, OT Occupational Therapist        Therapy Charges for Today     Code Description Service Date Service Provider Modifiers Qty    [id number]  OT EVAL MOD COMPLEXITY 4 4/4/2020 Guillermina Almaguer OT GO 1               Guillermina Almaguer OT  4/4/2020

## 2020-04-05 ENCOUNTER — ANESTHESIA (OUTPATIENT)
Dept: GASTROENTEROLOGY | Facility: HOSPITAL | Age: 80
End: 2020-04-05

## 2020-04-05 ENCOUNTER — ANESTHESIA EVENT (OUTPATIENT)
Dept: GASTROENTEROLOGY | Facility: HOSPITAL | Age: 80
End: 2020-04-05

## 2020-04-05 LAB
ANION GAP SERPL CALCULATED.3IONS-SCNC: 14 MMOL/L (ref 5–15)
BH BB BLOOD EXPIRATION DATE: NORMAL
BH BB BLOOD EXPIRATION DATE: NORMAL
BH BB BLOOD TYPE BARCODE: 5100
BH BB BLOOD TYPE BARCODE: 5100
BH BB DISPENSE STATUS: NORMAL
BH BB DISPENSE STATUS: NORMAL
BH BB PRODUCT CODE: NORMAL
BH BB PRODUCT CODE: NORMAL
BH BB UNIT NUMBER: NORMAL
BH BB UNIT NUMBER: NORMAL
BUN BLD-MCNC: 3 MG/DL (ref 8–23)
BUN/CREAT SERPL: 6 (ref 7–25)
CALCIUM SPEC-SCNC: 9.2 MG/DL (ref 8.6–10.5)
CEA SERPL-MCNC: 3.16 NG/ML
CHLORIDE SERPL-SCNC: 102 MMOL/L (ref 98–107)
CO2 SERPL-SCNC: 22 MMOL/L (ref 22–29)
CREAT BLD-MCNC: 0.5 MG/DL (ref 0.57–1)
CROSSMATCH INTERPRETATION: NORMAL
CROSSMATCH INTERPRETATION: NORMAL
GFR SERPL CREATININE-BSD FRML MDRD: 118 ML/MIN/1.73
GLUCOSE BLD-MCNC: 115 MG/DL (ref 65–99)
HCT VFR BLD AUTO: 33.5 % (ref 34–46.6)
HCT VFR BLD AUTO: 34.3 % (ref 34–46.6)
HGB BLD-MCNC: 10.4 G/DL (ref 12–15.9)
HGB BLD-MCNC: 11 G/DL (ref 12–15.9)
POTASSIUM BLD-SCNC: 3.4 MMOL/L (ref 3.5–5.2)
SODIUM BLD-SCNC: 138 MMOL/L (ref 136–145)
UNIT  ABO: NORMAL
UNIT  ABO: NORMAL
UNIT  RH: NORMAL
UNIT  RH: NORMAL

## 2020-04-05 PROCEDURE — 88305 TISSUE EXAM BY PATHOLOGIST: CPT | Performed by: INTERNAL MEDICINE

## 2020-04-05 PROCEDURE — 99233 SBSQ HOSP IP/OBS HIGH 50: CPT | Performed by: FAMILY MEDICINE

## 2020-04-05 PROCEDURE — 85018 HEMOGLOBIN: CPT | Performed by: FAMILY MEDICINE

## 2020-04-05 PROCEDURE — 80048 BASIC METABOLIC PNL TOTAL CA: CPT | Performed by: FAMILY MEDICINE

## 2020-04-05 PROCEDURE — 25010000002 NA FERRIC GLUC CPLX PER 12.5 MG: Performed by: INTERNAL MEDICINE

## 2020-04-05 PROCEDURE — 0DBP8ZX EXCISION OF RECTUM, VIA NATURAL OR ARTIFICIAL OPENING ENDOSCOPIC, DIAGNOSTIC: ICD-10-PCS | Performed by: INTERNAL MEDICINE

## 2020-04-05 PROCEDURE — 25010000003 LIDOCAINE 1 % SOLUTION: Performed by: NURSE ANESTHETIST, CERTIFIED REGISTERED

## 2020-04-05 PROCEDURE — 25010000002 PROPOFOL 10 MG/ML EMULSION: Performed by: NURSE ANESTHETIST, CERTIFIED REGISTERED

## 2020-04-05 PROCEDURE — 85014 HEMATOCRIT: CPT | Performed by: FAMILY MEDICINE

## 2020-04-05 RX ORDER — HYDRALAZINE HYDROCHLORIDE 20 MG/ML
5 INJECTION INTRAMUSCULAR; INTRAVENOUS
Status: DISCONTINUED | OUTPATIENT
Start: 2020-04-05 | End: 2020-04-05 | Stop reason: HOSPADM

## 2020-04-05 RX ORDER — BETHANECHOL CHLORIDE 10 MG/1
10 TABLET ORAL 3 TIMES DAILY
Status: DISCONTINUED | OUTPATIENT
Start: 2020-04-05 | End: 2020-04-08 | Stop reason: HOSPADM

## 2020-04-05 RX ORDER — AMLODIPINE BESYLATE 5 MG/1
5 TABLET ORAL DAILY
Status: DISCONTINUED | OUTPATIENT
Start: 2020-04-05 | End: 2020-04-08 | Stop reason: HOSPADM

## 2020-04-05 RX ORDER — IPRATROPIUM BROMIDE AND ALBUTEROL SULFATE 2.5; .5 MG/3ML; MG/3ML
3 SOLUTION RESPIRATORY (INHALATION) ONCE AS NEEDED
Status: DISCONTINUED | OUTPATIENT
Start: 2020-04-05 | End: 2020-04-05 | Stop reason: HOSPADM

## 2020-04-05 RX ORDER — CHOLECALCIFEROL (VITAMIN D3) 125 MCG
2.5 CAPSULE ORAL NIGHTLY
Status: DISCONTINUED | OUTPATIENT
Start: 2020-04-05 | End: 2020-04-08 | Stop reason: HOSPADM

## 2020-04-05 RX ORDER — SERTRALINE HYDROCHLORIDE 100 MG/1
100 TABLET, FILM COATED ORAL DAILY
Status: DISCONTINUED | OUTPATIENT
Start: 2020-04-05 | End: 2020-04-08 | Stop reason: HOSPADM

## 2020-04-05 RX ORDER — HYDROCODONE BITARTRATE AND ACETAMINOPHEN 10; 325 MG/1; MG/1
1 TABLET ORAL EVERY 6 HOURS PRN
Status: DISCONTINUED | OUTPATIENT
Start: 2020-04-05 | End: 2020-04-08 | Stop reason: HOSPADM

## 2020-04-05 RX ORDER — LABETALOL HYDROCHLORIDE 5 MG/ML
5 INJECTION, SOLUTION INTRAVENOUS
Status: DISCONTINUED | OUTPATIENT
Start: 2020-04-05 | End: 2020-04-05 | Stop reason: HOSPADM

## 2020-04-05 RX ORDER — LIDOCAINE HYDROCHLORIDE 10 MG/ML
INJECTION, SOLUTION INFILTRATION; PERINEURAL AS NEEDED
Status: DISCONTINUED | OUTPATIENT
Start: 2020-04-05 | End: 2020-04-05 | Stop reason: SURG

## 2020-04-05 RX ORDER — PROPOFOL 10 MG/ML
VIAL (ML) INTRAVENOUS CONTINUOUS PRN
Status: DISCONTINUED | OUTPATIENT
Start: 2020-04-05 | End: 2020-04-05 | Stop reason: SURG

## 2020-04-05 RX ORDER — ATORVASTATIN CALCIUM 20 MG/1
20 TABLET, FILM COATED ORAL DAILY
Status: DISCONTINUED | OUTPATIENT
Start: 2020-04-05 | End: 2020-04-08 | Stop reason: HOSPADM

## 2020-04-05 RX ORDER — TRAZODONE HYDROCHLORIDE 50 MG/1
50 TABLET ORAL NIGHTLY
Status: DISCONTINUED | OUTPATIENT
Start: 2020-04-05 | End: 2020-04-08 | Stop reason: HOSPADM

## 2020-04-05 RX ORDER — ACETAMINOPHEN 500 MG
500 TABLET ORAL EVERY 4 HOURS PRN
Status: DISCONTINUED | OUTPATIENT
Start: 2020-04-05 | End: 2020-04-08 | Stop reason: HOSPADM

## 2020-04-05 RX ORDER — PROPOFOL 10 MG/ML
VIAL (ML) INTRAVENOUS AS NEEDED
Status: DISCONTINUED | OUTPATIENT
Start: 2020-04-05 | End: 2020-04-05 | Stop reason: SURG

## 2020-04-05 RX ORDER — LOPERAMIDE HYDROCHLORIDE 2 MG/1
2 CAPSULE ORAL EVERY 6 HOURS PRN
Status: DISCONTINUED | OUTPATIENT
Start: 2020-04-05 | End: 2020-04-08 | Stop reason: HOSPADM

## 2020-04-05 RX ORDER — SODIUM CHLORIDE, SODIUM LACTATE, POTASSIUM CHLORIDE, CALCIUM CHLORIDE 600; 310; 30; 20 MG/100ML; MG/100ML; MG/100ML; MG/100ML
INJECTION, SOLUTION INTRAVENOUS CONTINUOUS PRN
Status: DISCONTINUED | OUTPATIENT
Start: 2020-04-05 | End: 2020-04-05 | Stop reason: SURG

## 2020-04-05 RX ORDER — ONDANSETRON 2 MG/ML
4 INJECTION INTRAMUSCULAR; INTRAVENOUS ONCE AS NEEDED
Status: DISCONTINUED | OUTPATIENT
Start: 2020-04-05 | End: 2020-04-05 | Stop reason: HOSPADM

## 2020-04-05 RX ORDER — OXYBUTYNIN CHLORIDE 5 MG/1
5 TABLET ORAL 3 TIMES DAILY
Status: DISCONTINUED | OUTPATIENT
Start: 2020-04-05 | End: 2020-04-05

## 2020-04-05 RX ORDER — BUSPIRONE HYDROCHLORIDE 5 MG/1
5 TABLET ORAL 2 TIMES DAILY
Status: DISCONTINUED | OUTPATIENT
Start: 2020-04-05 | End: 2020-04-08 | Stop reason: HOSPADM

## 2020-04-05 RX ORDER — ONDANSETRON 4 MG/1
4 TABLET, FILM COATED ORAL EVERY 6 HOURS PRN
Status: DISCONTINUED | OUTPATIENT
Start: 2020-04-05 | End: 2020-04-08 | Stop reason: HOSPADM

## 2020-04-05 RX ORDER — SENNA PLUS 8.6 MG/1
1 TABLET ORAL DAILY
Status: DISCONTINUED | OUTPATIENT
Start: 2020-04-05 | End: 2020-04-08 | Stop reason: HOSPADM

## 2020-04-05 RX ADMIN — PANTOPRAZOLE SODIUM 40 MG: 40 INJECTION, POWDER, FOR SOLUTION INTRAVENOUS at 09:46

## 2020-04-05 RX ADMIN — RIVASTIGMINE TRANSDERMAL SYSTEM 1 PATCH: 4.6 PATCH, EXTENDED RELEASE TRANSDERMAL at 09:46

## 2020-04-05 RX ADMIN — ACETAMINOPHEN 650 MG: 325 TABLET, FILM COATED ORAL at 01:59

## 2020-04-05 RX ADMIN — ATENOLOL 50 MG: 50 TABLET ORAL at 20:32

## 2020-04-05 RX ADMIN — BUSPIRONE HYDROCHLORIDE 5 MG: 5 TABLET ORAL at 20:32

## 2020-04-05 RX ADMIN — SODIUM CHLORIDE, POTASSIUM CHLORIDE, SODIUM LACTATE AND CALCIUM CHLORIDE: 600; 310; 30; 20 INJECTION, SOLUTION INTRAVENOUS at 08:08

## 2020-04-05 RX ADMIN — HYDROCODONE BITARTRATE AND ACETAMINOPHEN 1 TABLET: 10; 325 TABLET ORAL at 17:34

## 2020-04-05 RX ADMIN — PROPOFOL 75 MCG/KG/MIN: 10 INJECTION, EMULSION INTRAVENOUS at 08:22

## 2020-04-05 RX ADMIN — PROPOFOL 50 MG: 10 INJECTION, EMULSION INTRAVENOUS at 08:22

## 2020-04-05 RX ADMIN — ATENOLOL 50 MG: 50 TABLET ORAL at 09:48

## 2020-04-05 RX ADMIN — SODIUM CHLORIDE 125 MG: 9 INJECTION, SOLUTION INTRAVENOUS at 10:43

## 2020-04-05 RX ADMIN — POLYETHYLENE GLYCOL 3350, SODIUM SULFATE, SODIUM CHLORIDE, POTASSIUM CHLORIDE, ASCORBIC ACID, SODIUM ASCORBATE 1000 ML: KIT at 04:08

## 2020-04-05 RX ADMIN — SERTRALINE HYDROCHLORIDE 100 MG: 100 TABLET ORAL at 09:46

## 2020-04-05 RX ADMIN — HYDROCODONE BITARTRATE AND ACETAMINOPHEN 1 TABLET: 10; 325 TABLET ORAL at 10:42

## 2020-04-05 RX ADMIN — BUSPIRONE HYDROCHLORIDE 5 MG: 5 TABLET ORAL at 09:46

## 2020-04-05 RX ADMIN — PANTOPRAZOLE SODIUM 40 MG: 40 INJECTION, POWDER, FOR SOLUTION INTRAVENOUS at 17:34

## 2020-04-05 RX ADMIN — TRAZODONE HYDROCHLORIDE 50 MG: 50 TABLET ORAL at 20:32

## 2020-04-05 RX ADMIN — ALPRAZOLAM 0.25 MG: 0.25 TABLET ORAL at 18:40

## 2020-04-05 RX ADMIN — LIDOCAINE HYDROCHLORIDE 50 MG: 10 INJECTION, SOLUTION INFILTRATION; PERINEURAL at 08:22

## 2020-04-05 RX ADMIN — AMLODIPINE BESYLATE 5 MG: 5 TABLET ORAL at 09:48

## 2020-04-05 RX ADMIN — BETHANECHOL CHLORIDE 10 MG: 10 TABLET ORAL at 20:32

## 2020-04-05 RX ADMIN — BETHANECHOL CHLORIDE 10 MG: 10 TABLET ORAL at 17:34

## 2020-04-05 RX ADMIN — ATORVASTATIN CALCIUM 20 MG: 20 TABLET, FILM COATED ORAL at 09:46

## 2020-04-05 RX ADMIN — OXYBUTYNIN CHLORIDE 5 MG: 5 TABLET ORAL at 09:46

## 2020-04-05 RX ADMIN — MELATONIN TAB 5 MG 2.5 MG: 5 TAB at 20:31

## 2020-04-05 NOTE — ANESTHESIA POSTPROCEDURE EVALUATION
Patient: Loni Walker    Procedure Summary     Date:  04/05/20 Room / Location:   [facility]    Anesthesia Start:  0808 Anesthesia Stop:      Procedure:  COLONOSCOPY (N/A ) Diagnosis:       Rectal bleeding      (Rectal bleeding [K62.5])    Surgeon:  Celestine Baer MD Provider:  Cheko Marquez MD    Anesthesia Type:  MAC ASA Status:  3          Anesthesia Type: MAC    Vitals  Vitals Value Taken Time   /46 4/5/2020  8:50 AM   Temp 97.2 °F (36.2 °C) 4/5/2020  8:50 AM   Pulse 67 4/5/2020  8:56 AM   Resp 14 4/5/2020  8:50 AM   SpO2 97 % 4/5/2020  8:56 AM   Vitals shown include unvalidated device data.        Post Anesthesia Care and Evaluation    Patient location during evaluation: PACU  Patient participation: complete - patient participated  Level of consciousness: awake and alert  Pain score: 0  Pain management: adequate  Airway patency: patent  Anesthetic complications: No anesthetic complications  PONV Status: none  Cardiovascular status: hemodynamically stable and acceptable  Respiratory status: nonlabored ventilation, acceptable and nasal cannula  Hydration status: acceptable

## 2020-04-05 NOTE — PLAN OF CARE
The patient presented as teary, said that she was scared, does not want to leave her four children.  She also expressed sadness and grief when talking about a son who  last year and her  who  in , wanting their support now.  I provided narrative and empathic listening, validating her feelings.  I also provided prayer upon request.

## 2020-04-05 NOTE — PROGRESS NOTES
[facility] Medicine Services  PROGRESS NOTE    Patient Name: Loni Walker  : 1940  MRN: [id number]    Date of Admission: 4/3/2020  Primary Care Physician: Provider, No Known    Subjective   Subjective     CC:  F/U GIB    HPI:  Pt seen and examined. Nursing notes reviewed. Pt states she continues to feel weak. She had a colonoscopy tomorrow and found to have a mass in the rectum. Pt tearful on exam.     Review of Systems  Gen- No fevers, chills  CV- No chest pain, palpitations  Resp- No cough, dyspnea  GI- No N/V/D, abd pain    Objective   Objective     Vital Signs:   Temp:  [97.2 °F (36.2 °C)-99.3 °F (37.4 °C)] 97.7 °F (36.5 °C)  Heart Rate:  [69-84] 81  Resp:  [14-18] 14  BP: (125-166)/(40-91) 160/63        Physical Exam:  Constitutional: No acute distress, awake, alert  HENT: NCAT, mucous membranes moist  Respiratory: Clear to auscultation bilaterally, respiratory effort normal   Cardiovascular: RRR, +murmur, No rubs or gallops, palpable pedal pulses bilaterally  Gastrointestinal: Positive bowel sounds, soft, nontender, nondistended  Musculoskeletal: No bilateral ankle edema  Psychiatric: Anxious/tearful, cooperative  Neurologic: Oriented x 3, strength symmetric in all extremities, Cranial Nerves grossly intact to confrontation, speech clear  Skin: No rashes    Results Reviewed:  Results from last 7 days   Lab Units 20  0742 20  2341 20  1830 20  1105  20  1304   WBC 10*3/mm3  --   --   --  9.97  --  7.67   HEMOGLOBIN g/dL 11.0* 10.4* 10.2* 10.1*   < > 7.8*   HEMATOCRIT % 34.3 33.5* 32.2* 31.8*   < > 25.8*   PLATELETS 10*3/mm3  --   --   --  279  --  284   INR   --   --   --   --   --  1.07    < > = values in this interval not displayed.     Results from last 7 days   Lab Units 20  0742 20  1105 20  1303   SODIUM mmol/L 138 139 137   POTASSIUM mmol/L 3.4* 4.1 4.2   CHLORIDE mmol/L 102 102 100   CO2 mmol/L 22.0 26.0 27.0   BUN mg/dL  3* 8 12   CREATININE mg/dL 0.50* 0.60 0.82   GLUCOSE mg/dL 115* 116* 127*   CALCIUM mg/dL 9.2 9.2 9.2   ALT (SGPT) U/L  --  6 7   AST (SGOT) U/L  --  14 13   TROPONIN T ng/mL  --   --  <0.010   PROBNP pg/mL  --   --  486.4     Estimated Creatinine Clearance: 49.5 mL/min (A) (by C-G formula based on SCr of 0.5 mg/dL (L)).    Microbiology Results Abnormal     None          Imaging Results (Last 24 Hours)     ** No results found for the last 24 hours. **               I have reviewed the medications:  Scheduled Meds:    amLODIPine 5 mg Oral Daily   atenolol 50 mg Oral Q12H   atorvastatin 20 mg Oral Daily   busPIRone 5 mg Oral BID   ferric gluconate 125 mg Intravenous Daily   melatonin 2.5 mg Oral Nightly   oxybutynin 5 mg Oral TID   pantoprazole 40 mg Intravenous BID AC   polyethylene glycol 17 g Oral Daily   rivastigmine 1 patch Transdermal Daily   senna 1 tablet Oral Daily   sertraline 100 mg Oral Daily   sodium chloride 10 mL Intravenous Q12H   traZODone 50 mg Oral Nightly     Continuous Infusions:    sodium chloride 50 mL/hr Last Rate: 50 mL/hr (04/04/20 2100)     PRN Meds:.•  acetaminophen  •  acetaminophen  •  ALPRAZolam  •  hydrALAZINE  •  HYDROcodone-acetaminophen  •  loperamide  •  ondansetron  •  ondansetron  •  sodium chloride  •  sodium chloride    Assessment/Plan   Assessment & Plan     Active Hospital Problems    Diagnosis  POA   • **Acute blood loss anemia [D62]  Unknown   • Rectal bleeding [K62.5]  Yes   • Mixed hyperlipidemia [E78.2]  Unknown   • Hydronephrosis [N13.30]  Unknown   • Essential hypertension [I10]  Unknown      Resolved Hospital Problems   No resolved problems to display.        Brief Hospital Course to date:  Loni Walker is a 80 y.o. female presenting with abd pain that started 2-3 days ago with intermittent episodes of bright red blood in stool 2 weeks ago. She also states that she has had some dark stools. She reports decreased appetite and a 20 lb weight loss over the last 6 months.   CT of abd/pelvis concerning for malignancy with metastatic disease. Pt is s/p 2 units of PRBCs. Pt had colonoscopy 4/5 with Dr. Baer and found to have small mass in the rectum. Path sent and pending.    This patient's problems and plans were partially entered by my partner and updated as appropriate by me 04/05/20.    Acute blood loss anemia/rectal bleeding  20 lb weight loss over 6 months  decreased appetite  - CT of the abd: Abn wall thickening of the rectum and malignancy can not be excluded. Adenopathy identified throughout the retroperioneum, concerning for metastatic disease.   - Protonix 40 mg IV BID   - GI following, s/p colonoscopy 4/5 with rectal mass, biopsy taken. Heme/Onc consulted for the AM  - hold Plavix    - B12, folate WNL  - Iron low 17, will order 3 doses of IV iron (2/3)  -Pt s/p 2 units of PRBCs. H&H improved.     HTN/HLD  - Continue atenolol 50 mg bid  - Norvasc 5mg resumed      Anxiety/Depression  - continue xanax 0.5 mg TID PRN  - continue prozac 20 mg daily     HX of CVA  -PT/OT to eval and treat  - hold home plavix d/t GIB     Hydronephrosis, history of urinary retention  -FC placed yesterday, will remove today and see if patient can urinate   -Hold Ditropan  --Pt has sulfa allergy, so avoid Flomax  --Add Bethanechol   --If no improvement, will ask Urology to evaluate  --PRN bladder scan, in/out cath     DVT prophylaxis:  Mechanical    Disposition: I expect the patient to be discharged pending Heme/Onc eval for rectal mass and stable H&H    CODE STATUS:   Code Status and Medical Interventions:   Ordered at: 04/03/20 1920     Limited Support to NOT Include:    Antiarrhythmic Drugs    Cardioversion/Defibrillation    Intubation     Level Of Support Discussed With:    Patient     Code Status:    No CPR     Medical Interventions (Level of Support Prior to Arrest):    Limited         Electronically signed by Galilea Campos DO, 04/05/20, 11:17.

## 2020-04-05 NOTE — PLAN OF CARE
Problem: Patient Care Overview  Goal: Plan of Care Review  Outcome: Ongoing (interventions implemented as appropriate)  Flowsheets (Taken 4/5/2020 4584)  Progress: no change  Plan of Care Reviewed With: patient  Goal: Discharge Needs Assessment  Outcome: Ongoing (interventions implemented as appropriate)  Flowsheets  Taken 4/3/2020 1904 by Nidia Gonzales RN  Equipment Currently Used at Home: walker, rolling;wheelchair  Taken 4/3/2020 1906 by Nidia Gonzales RN  Transportation Anticipated: family or friend will provide  Patient/Family Anticipated Services at Transition:   Patient/Family Anticipates Transition to: long term care facility  Taken 4/3/2020 1806 by Lydia Duncan  Concerns to be Addressed: denies needs/concerns at this time  Readmission Within the Last 30 Days: no previous admission in last 30 days     Problem: Fall Risk (Adult)  Goal: Absence of Fall  Description  Patient will demonstrate the desired outcomes by discharge/transition of care.  Outcome: Ongoing (interventions implemented as appropriate)  Flowsheets (Taken 4/5/2020 9918)  Absence of Fall: making progress toward outcome

## 2020-04-05 NOTE — CONSULTS
"Clinical Nutrition   Reason For Visit: Identified at risk by screening criteria, MST score 2+, Unintentional weight loss, Reduced oral intake    Patient Name: Loni Walker  YOB: 1940  MRN: [id number]  Date of Encounter: 04/05/20 17:38  Admission date: 4/3/2020        Nutrition Assessment     Admission Problem List:  • **Acute blood loss anemia [D62]   Unknown   • Rectal bleeding [K62.5]   Yes   • Mixed hyperlipidemia [E78.2]   Unknown   • Hydronephrosis [N13.30]   Unknown   • Essential hypertension          Applicable medical tests/procedures since admission:  4/3 CT abdomen  4/5 colonoscopy      PMH: She  has a past medical history of Anxiety, Arthritis, Bipolar disorder (CMS/[facility]), CAD (coronary artery disease), Carotid stenosis, Cataracts, bilateral, Contracture of ankle, right, Coronary artery disease, CVA (cerebral vascular accident) (CMS/HCC), Dementia with behavioral disturbance (CMS/[facility]), Depression, Dysphasia, Generalized weakness, GERD (gastroesophageal reflux disease), migraines, Hyperlipidemia, Hypertension, Insomnia, Osteoarthritis, and Overactive bladder.   PSxH: She  has a past surgical history that includes Cholecystectomy; Hysterectomy; and Abdominal surgery.        Reported/Observed/Food/Nutrition Related History     Pt states she has had a good appetite but it has been less since she has been admitted to the hospital.  She states she does not like being away from home.  She asks for fruit on her trays.  Offered pt Boost and she is agreeable to try it 2x/day.      Anthropometrics   Height: 59\"  Weight: 122 lbs 6.4 oz  BMI: 24.72  BMI classification: Normal: 18.5-24.9kg/m2   IBW: 97 lbs    UBW: 125-130 lbs per pt  Weight change:  Pt denies any weight changes        Labs reviewed   Labs reviewed: Yes    Results from last 7 days   Lab Units 04/05/20  0742 04/04/20  1105 04/03/20  1303   SODIUM mmol/L 138 139 137   POTASSIUM mmol/L 3.4* 4.1 4.2   CHLORIDE mmol/L 102 102 100   CO2 mmol/L " 22.0 26.0 27.0   BUN mg/dL 3* 8 12   CREATININE mg/dL 0.50* 0.60 0.82   GLUCOSE mg/dL 115* 116* 127*   CALCIUM mg/dL 9.2 9.2 9.2     Results from last 7 days   Lab Units 04/04/20  1105 04/03/20  1304 04/03/20  1303   WBC 10*3/mm3 9.97 7.67  --    ALBUMIN g/dL 3.80  --  3.90     Results from last 7 days   Lab Units 04/04/20  0325   GLUCOSE mg/dL 119     Lab Results   Lab Value Date/Time    HGBA1C 5.40 04/03/2020 1304    HGBA1C 6.30 (H) 03/06/2017 1405     Medications reviewed   Medications reviewed: Yes  Pertinent: Protonix, atenolol, ferric gluconate    Intake/Ouptut 24 hrs (7:00AM - 6:59 AM)     Intake & Output (last day)       04/04 0701 - 04/05 0700 04/05 0701 - 04/06 0700    I.V. (mL/kg) 554.4 (9.8) 500 (8.8)    Blood 370     IV Piggyback 100     Total Intake(mL/kg) 1024.4 (18) 500 (8.8)    Urine (mL/kg/hr) 2575 (1.9) 1150 (1.9)    Stool 8 0    Total Output 2583 1150    Net -1558.6 -650          Urine Unmeasured Occurrence  1 x    Stool Unmeasured Occurrence 9 x 2 x            Current Nutrition Prescription   PO: Diet Regular    Evaluation of Received Nutrient/Fluid Intake:  Insufficient data       Nutrition Diagnosis     Problem Inadequate oral intake   Etiology Anemia, rectal bleeding   Signs/Symptoms Reported decrease appetite since admission       Intervention   Intervention: Follow treatment progress, Care plan reviewed, Advise alternate selection, Interview for preferences, Encourage intake, Supplement provided  Boost 2x/day    Goal:   General: Nutrition support treatment  PO: Tolerate PO, Increase intake       Monitoring/Evaluation:       Monitoring/Evaluation: Per protocol, PO intake, Supplement intake, Weight, Symptoms  Will Continue to follow per protocol  Radha Coreas RD  Time Spent: 35 min

## 2020-04-05 NOTE — POST-PROCEDURE NOTE
Colon- exam to cecum.  Small mass in rectum posteriorly starting at anal verge.  Bleeding site- biopsied.  A very few sigmoid diverticula

## 2020-04-05 NOTE — ANESTHESIA PREPROCEDURE EVALUATION
Anesthesia Evaluation                  Airway   Mallampati: II  Dental      Pulmonary    Cardiovascular     (+) hypertension, CAD,  carotid artery disease      Neuro/Psych  GI/Hepatic/Renal/Endo    (+)  GI bleeding , renal disease,     Musculoskeletal     Abdominal    Substance History      OB/GYN          Other                        Anesthesia Plan    ASA 3     MAC     intravenous induction     Anesthetic plan, all risks, benefits, and alternatives have been provided, discussed and informed consent has been obtained with: patient.

## 2020-04-06 ENCOUNTER — APPOINTMENT (OUTPATIENT)
Dept: CT IMAGING | Facility: HOSPITAL | Age: 80
End: 2020-04-06

## 2020-04-06 LAB
ANION GAP SERPL CALCULATED.3IONS-SCNC: 13 MMOL/L (ref 5–15)
BUN BLD-MCNC: 4 MG/DL (ref 8–23)
BUN/CREAT SERPL: 7.1 (ref 7–25)
CALCIUM SPEC-SCNC: 9.4 MG/DL (ref 8.6–10.5)
CHLORIDE SERPL-SCNC: 102 MMOL/L (ref 98–107)
CO2 SERPL-SCNC: 24 MMOL/L (ref 22–29)
CREAT BLD-MCNC: 0.56 MG/DL (ref 0.57–1)
DEPRECATED RDW RBC AUTO: 49.8 FL (ref 37–54)
ERYTHROCYTE [DISTWIDTH] IN BLOOD BY AUTOMATED COUNT: 15.5 % (ref 12.3–15.4)
GFR SERPL CREATININE-BSD FRML MDRD: 104 ML/MIN/1.73
GLUCOSE BLD-MCNC: 121 MG/DL (ref 65–99)
HCT VFR BLD AUTO: 33.7 % (ref 34–46.6)
HGB BLD-MCNC: 10.6 G/DL (ref 12–15.9)
MCH RBC QN AUTO: 28.3 PG (ref 26.6–33)
MCHC RBC AUTO-ENTMCNC: 31.5 G/DL (ref 31.5–35.7)
MCV RBC AUTO: 89.9 FL (ref 79–97)
PLATELET # BLD AUTO: 291 10*3/MM3 (ref 140–450)
PMV BLD AUTO: 10.4 FL (ref 6–12)
POTASSIUM BLD-SCNC: 3.4 MMOL/L (ref 3.5–5.2)
POTASSIUM BLD-SCNC: 4.3 MMOL/L (ref 3.5–5.2)
RBC # BLD AUTO: 3.75 10*6/MM3 (ref 3.77–5.28)
SODIUM BLD-SCNC: 139 MMOL/L (ref 136–145)
WBC NRBC COR # BLD: 11.75 10*3/MM3 (ref 3.4–10.8)

## 2020-04-06 PROCEDURE — 99221 1ST HOSP IP/OBS SF/LOW 40: CPT | Performed by: INTERNAL MEDICINE

## 2020-04-06 PROCEDURE — 84132 ASSAY OF SERUM POTASSIUM: CPT | Performed by: FAMILY MEDICINE

## 2020-04-06 PROCEDURE — 80048 BASIC METABOLIC PNL TOTAL CA: CPT | Performed by: FAMILY MEDICINE

## 2020-04-06 PROCEDURE — 71250 CT THORAX DX C-: CPT

## 2020-04-06 PROCEDURE — 25010000002 LORAZEPAM PER 2 MG: Performed by: FAMILY MEDICINE

## 2020-04-06 PROCEDURE — 85027 COMPLETE CBC AUTOMATED: CPT | Performed by: FAMILY MEDICINE

## 2020-04-06 PROCEDURE — 99233 SBSQ HOSP IP/OBS HIGH 50: CPT | Performed by: FAMILY MEDICINE

## 2020-04-06 PROCEDURE — 25010000002 NA FERRIC GLUC CPLX PER 12.5 MG: Performed by: INTERNAL MEDICINE

## 2020-04-06 RX ORDER — POTASSIUM CHLORIDE 7.45 MG/ML
10 INJECTION INTRAVENOUS
Status: DISCONTINUED | OUTPATIENT
Start: 2020-04-06 | End: 2020-04-08 | Stop reason: HOSPADM

## 2020-04-06 RX ORDER — LORAZEPAM 2 MG/ML
0.5 INJECTION INTRAMUSCULAR ONCE
Status: COMPLETED | OUTPATIENT
Start: 2020-04-06 | End: 2020-04-06

## 2020-04-06 RX ORDER — CLONIDINE HYDROCHLORIDE 0.1 MG/1
0.1 TABLET ORAL EVERY 12 HOURS PRN
Status: DISCONTINUED | OUTPATIENT
Start: 2020-04-06 | End: 2020-04-08 | Stop reason: HOSPADM

## 2020-04-06 RX ORDER — POTASSIUM CHLORIDE 750 MG/1
40 CAPSULE, EXTENDED RELEASE ORAL AS NEEDED
Status: DISCONTINUED | OUTPATIENT
Start: 2020-04-06 | End: 2020-04-08 | Stop reason: HOSPADM

## 2020-04-06 RX ORDER — ROPINIROLE 0.5 MG/1
0.25 TABLET, FILM COATED ORAL NIGHTLY
Status: DISCONTINUED | OUTPATIENT
Start: 2020-04-06 | End: 2020-04-08 | Stop reason: HOSPADM

## 2020-04-06 RX ORDER — POTASSIUM CHLORIDE 1.5 G/1.77G
40 POWDER, FOR SOLUTION ORAL AS NEEDED
Status: DISCONTINUED | OUTPATIENT
Start: 2020-04-06 | End: 2020-04-08 | Stop reason: HOSPADM

## 2020-04-06 RX ADMIN — BUSPIRONE HYDROCHLORIDE 5 MG: 5 TABLET ORAL at 08:11

## 2020-04-06 RX ADMIN — POLYETHYLENE GLYCOL 3350 17 G: 17 POWDER, FOR SOLUTION ORAL at 08:12

## 2020-04-06 RX ADMIN — POTASSIUM CHLORIDE 40 MEQ: 10 CAPSULE, COATED, EXTENDED RELEASE ORAL at 13:36

## 2020-04-06 RX ADMIN — AMLODIPINE BESYLATE 5 MG: 5 TABLET ORAL at 08:11

## 2020-04-06 RX ADMIN — PANTOPRAZOLE SODIUM 40 MG: 40 INJECTION, POWDER, FOR SOLUTION INTRAVENOUS at 16:55

## 2020-04-06 RX ADMIN — BETHANECHOL CHLORIDE 10 MG: 10 TABLET ORAL at 20:49

## 2020-04-06 RX ADMIN — PANTOPRAZOLE SODIUM 40 MG: 40 INJECTION, POWDER, FOR SOLUTION INTRAVENOUS at 05:33

## 2020-04-06 RX ADMIN — ATENOLOL 50 MG: 50 TABLET ORAL at 08:11

## 2020-04-06 RX ADMIN — RIVASTIGMINE TRANSDERMAL SYSTEM 1 PATCH: 4.6 PATCH, EXTENDED RELEASE TRANSDERMAL at 08:13

## 2020-04-06 RX ADMIN — BETHANECHOL CHLORIDE 10 MG: 10 TABLET ORAL at 08:11

## 2020-04-06 RX ADMIN — TRAZODONE HYDROCHLORIDE 50 MG: 50 TABLET ORAL at 20:49

## 2020-04-06 RX ADMIN — SODIUM CHLORIDE 125 MG: 9 INJECTION, SOLUTION INTRAVENOUS at 10:15

## 2020-04-06 RX ADMIN — POTASSIUM CHLORIDE 40 MEQ: 10 CAPSULE, COATED, EXTENDED RELEASE ORAL at 09:52

## 2020-04-06 RX ADMIN — SENNOSIDES 1 TABLET: 8.6 TABLET, FILM COATED ORAL at 08:11

## 2020-04-06 RX ADMIN — BETHANECHOL CHLORIDE 10 MG: 10 TABLET ORAL at 16:55

## 2020-04-06 RX ADMIN — ACETAMINOPHEN 500 MG: 500 TABLET, FILM COATED ORAL at 04:53

## 2020-04-06 RX ADMIN — SERTRALINE HYDROCHLORIDE 100 MG: 100 TABLET ORAL at 08:11

## 2020-04-06 RX ADMIN — ROPINIROLE 0.25 MG: 0.5 TABLET, FILM COATED ORAL at 20:48

## 2020-04-06 RX ADMIN — HYDROCODONE BITARTRATE AND ACETAMINOPHEN 1 TABLET: 10; 325 TABLET ORAL at 17:53

## 2020-04-06 RX ADMIN — SODIUM CHLORIDE, PRESERVATIVE FREE 10 ML: 5 INJECTION INTRAVENOUS at 08:12

## 2020-04-06 RX ADMIN — BUSPIRONE HYDROCHLORIDE 5 MG: 5 TABLET ORAL at 20:49

## 2020-04-06 RX ADMIN — LORAZEPAM 0.5 MG: 2 INJECTION INTRAMUSCULAR; INTRAVENOUS at 12:05

## 2020-04-06 RX ADMIN — ATENOLOL 50 MG: 50 TABLET ORAL at 20:48

## 2020-04-06 RX ADMIN — MELATONIN TAB 5 MG 2.5 MG: 5 TAB at 20:51

## 2020-04-06 RX ADMIN — HYDROCODONE BITARTRATE AND ACETAMINOPHEN 1 TABLET: 10; 325 TABLET ORAL at 06:20

## 2020-04-06 RX ADMIN — ATORVASTATIN CALCIUM 20 MG: 20 TABLET, FILM COATED ORAL at 08:12

## 2020-04-06 RX ADMIN — ALPRAZOLAM 0.25 MG: 0.25 TABLET ORAL at 06:20

## 2020-04-06 NOTE — PLAN OF CARE
Problem: Patient Care Overview  Goal: Plan of Care Review  Outcome: Ongoing (interventions implemented as appropriate)  Flowsheets (Taken 4/6/2020 1601)  Progress: no change  Plan of Care Reviewed With: patient  Outcome Summary: Pt has no complaints of pain. Pt does have some anxiety. Pt resting between care. Bladder scans PRN. VSS, NSR. RA. Will continue to monitor.     Problem: Patient Care Overview  Goal: Individualization and Mutuality  Outcome: Ongoing (interventions implemented as appropriate)     Problem: Patient Care Overview  Goal: Discharge Needs Assessment  Outcome: Ongoing (interventions implemented as appropriate)     Problem: Patient Care Overview  Goal: Interprofessional Rounds/Family Conf  Outcome: Ongoing (interventions implemented as appropriate)

## 2020-04-06 NOTE — CONSULTS
REFERRING PHYSICIAN: Galilea Campos DO    PRIMARY CARE PROVIDER: Provider, No Known    REASON FOR CONSULTATION: Metastatic Rectal Cancer      HISTORY OF PRESENT ILLNESS:  80-year-old lady who presents to the hospital with rectal bleeding.  CAT scan of the abdomen revealed mucosal thickening at the rectum and significant retroperitoneal adenopathy.  Biopsies were taken and are still pending.  This is likely metastatic rectal cancer.  She is fairly frail at baseline.  She has some dementia that present.  She lives in a nursing home.  She requires a wheelchair to get around.  She has had 20 pound weight loss over the last 6 months.      Allergies   Allergen Reactions   • Hydrocodone Hives   • Oxycodone Hives   • Penicillins Hives   • Sulfa Antibiotics Hives   • Doxycycline    • Lyrica [Pregabalin]        Past Medical History:   Diagnosis Date   • Anxiety    • Arthritis    • Bipolar disorder (CMS/HCC)    • CAD (coronary artery disease)    • Carotid stenosis    • Cataracts, bilateral    • Contracture of ankle, right    • Coronary artery disease    • CVA (cerebral vascular accident) (CMS/HCC)    • Dementia with behavioral disturbance (CMS/HCC)    • Depression    • Dysphasia    • Generalized weakness    • GERD (gastroesophageal reflux disease)    • Hx of migraines    • Hyperlipidemia    • Hypertension    • Insomnia    • Osteoarthritis    • Overactive bladder          Current Facility-Administered Medications:   •  acetaminophen (TYLENOL) tablet 500 mg, 500 mg, Oral, Q4H PRN, Celestine Baer MD, 500 mg at 04/06/20 0453  •  acetaminophen (TYLENOL) tablet 650 mg, 650 mg, Oral, Q6H PRN, Celestine Baer MD, 650 mg at 04/05/20 0159  •  ALPRAZolam (XANAX) tablet 0.25 mg, 0.25 mg, Oral, TID PRN, Celestine Baer MD, 0.25 mg at 04/06/20 0620  •  amLODIPine (NORVASC) tablet 5 mg, 5 mg, Oral, Daily, Celestine Baer MD, 5 mg at 04/06/20 0811  •  atenolol (TENORMIN) tablet 50 mg, 50 mg, Oral, Q12H, Celestine Baer MD, 50 mg at  04/06/20 0811  •  atorvastatin (LIPITOR) tablet 20 mg, 20 mg, Oral, Daily, Celestien Baer MD, 20 mg at 04/06/20 0812  •  bethanechol (URECHOLINE) tablet 10 mg, 10 mg, Oral, TID, Galilea Campos DO, 10 mg at 04/06/20 0811  •  busPIRone (BUSPAR) tablet 5 mg, 5 mg, Oral, BID, Celestine Baer MD, 5 mg at 04/06/20 0811  •  cloNIDine (CATAPRES) tablet 0.1 mg, 0.1 mg, Oral, Q12H PRN, Galilea Campos,   •  hydrALAZINE (APRESOLINE) injection 10 mg, 10 mg, Intravenous, Q6H PRN, Celestine Baer MD  •  HYDROcodone-acetaminophen (NORCO)  MG per tablet 1 tablet, 1 tablet, Oral, Q6H PRN, Celestine Baer MD, 1 tablet at 04/06/20 0620  •  loperamide (IMODIUM) capsule 2 mg, 2 mg, Oral, Q6H PRN, Celestine Baer MD  •  melatonin tablet 2.5 mg, 2.5 mg, Oral, Nightly, Celestine Baer MD, 2.5 mg at 04/05/20 2031  •  ondansetron (ZOFRAN) injection 4 mg, 4 mg, Intravenous, Q6H PRN, Celestine Baer MD  •  ondansetron (ZOFRAN) tablet 4 mg, 4 mg, Oral, Q6H PRN, Celestine Baer MD  •  pantoprazole (PROTONIX) injection 40 mg, 40 mg, Intravenous, BID AC, Celestine Baer MD, 40 mg at 04/06/20 0533  •  polyethylene glycol 3350 powder (packet), 17 g, Oral, Daily, Celestine Baer MD, 17 g at 04/06/20 0812  •  potassium chloride (MICRO-K) CR capsule 40 mEq, 40 mEq, Oral, PRN, 40 mEq at 04/06/20 0952 **OR** potassium chloride (KLOR-CON) packet 40 mEq, 40 mEq, Oral, PRN **OR** potassium chloride 10 mEq in 100 mL IVPB, 10 mEq, Intravenous, Q1H PRN, Galilea Campos, DO  •  rivastigmine (EXELON) 4.6 MG/24HR patch 1 patch, 1 patch, Transdermal, Daily, Celestine Baer MD, 1 patch at 04/06/20 0813  •  rOPINIRole (REQUIP) tablet 0.25 mg, 0.25 mg, Oral, Nightly, Galilea Campos, DO  •  senna (SENOKOT) tablet 1 tablet, 1 tablet, Oral, Daily, Celestine Baer MD, 1 tablet at 04/06/20 0811  •  sertraline (ZOLOFT) tablet 100 mg, 100 mg, Oral, Daily, Celestine Baer MD, 100 mg at 04/06/20 0811  •  sodium chloride 0.9 % flush 10 mL,  10 mL, Intravenous, PRN, Celestine Baer MD  •  sodium chloride 0.9 % flush 10 mL, 10 mL, Intravenous, Q12H, Celestine Baer MD, 10 mL at 04/06/20 0812  •  sodium chloride 0.9 % flush 10 mL, 10 mL, Intravenous, PRN, Celestine Baer MD  •  sodium chloride 0.9 % infusion, 50 mL/hr, Intravenous, Continuous, Celestine Baer MD, Last Rate: 50 mL/hr at 04/04/20 2100, 50 mL/hr at 04/04/20 2100  •  traZODone (DESYREL) tablet 50 mg, 50 mg, Oral, Nightly, Celestine Baer MD, 50 mg at 04/05/20 2032    Past Surgical History:   Procedure Laterality Date   • ABDOMINAL SURGERY     • CHOLECYSTECTOMY     • COLONOSCOPY N/A 4/5/2020    Procedure: COLONOSCOPY;  Surgeon: Celestine Baer MD;  Location: [facility] ENDOSCOPY;  Service: Gastroenterology;  Laterality: N/A;   • HYSTERECTOMY         Social History     Socioeconomic History   • Marital status:      Spouse name: Not on file   • Number of children: Not on file   • Years of education: Not on file   • Highest education level: Not on file   Tobacco Use   • Smoking status: Former Smoker     Packs/day: 1.00     Years: 15.00     Pack years: 15.00     Last attempt to quit: 9/21/2016     Years since quitting: 3.5   • Smokeless tobacco: Never Used   Substance and Sexual Activity   • Alcohol use: No   • Drug use: No   • Sexual activity: Never       Family History   Problem Relation Age of Onset   • Arthritis Mother    • Early death Mother    • Heart disease Father    • Hypertension Father    • Hyperlipidemia Father    • Diabetes Father    • Hypertension Daughter         No history exists.         REVIEW OF SYSTEMS:  A 14 point review of systems was performed and is negative except as noted below.    Review of Systems   Constitutional: Positive for appetite change, fatigue and unexpected weight change.   HENT:  Negative.    Eyes: Negative.    Respiratory: Negative.    Cardiovascular: Negative.    Gastrointestinal: Positive for blood in stool.   Endocrine: Negative.     Genitourinary: Negative.     Skin: Negative.    Neurological: Positive for extremity weakness.   Hematological: Negative.    Psychiatric/Behavioral: Positive for confusion.         Objective     Vitals:    04/06/20 0100 04/06/20 0300 04/06/20 0500 04/06/20 0832   BP:    171/79   BP Location:    Left arm   Patient Position:    Lying   Pulse: 55 61 66 72   Resp:    18   Temp:    97.1 °F (36.2 °C)   TempSrc:    Axillary   SpO2: 94% 94% 94%    Weight:   56.7 kg (125 lb)    Height:           Temp:  [97.1 °F (36.2 °C)-99.4 °F (37.4 °C)] 97.1 °F (36.2 °C)     Performance Status: 3    Physical Exam    General: frail appearing female in no acute distress  HEENT: sclerae anicteric, oropharynx clear  Lymphatics: no cervical, supraclavicular, or axillary adenopathy  Cardiovascular: regular rate and rhythm, no murmurs  Lungs: clear to auscultation bilaterally  Abdomen: soft, nontender, nondistended.  No palpable organomegaly  Extremities: no lower extremity edema  Skin: no rashes, lesions, bruising, or petechiae      LABS:    Lab Results   Component Value Date    HGB 10.6 (L) 04/06/2020    HCT 33.7 (L) 04/06/2020    MCV 89.9 04/06/2020     04/06/2020    WBC 11.75 (H) 04/06/2020    NEUTROABS 5.76 04/03/2020    LYMPHSABS 1.23 04/03/2020    MONOSABS 0.46 04/03/2020    EOSABS 0.15 04/03/2020    BASOSABS 0.05 04/03/2020     Lab Results   Component Value Date    GLUCOSE 121 (H) 04/06/2020    BUN 4 (L) 04/06/2020    CREATININE 0.56 (L) 04/06/2020     04/06/2020    K 3.4 (L) 04/06/2020     04/06/2020    CO2 24.0 04/06/2020    CALCIUM 9.4 04/06/2020    PROTEINTOT 7.4 04/04/2020    ALBUMIN 3.80 04/04/2020    BILITOT 0.6 04/04/2020    ALKPHOS 70 04/04/2020    AST 14 04/04/2020    ALT 6 04/04/2020     Lab Results   Component Value Date    CEA 3.16 04/03/2020         IMAGING    Ct Abdomen Pelvis With Contrast    Result Date: 4/3/2020  EXAMINATION: CT ABDOMEN/PELVIS W CONTRAST - 04/03/2020  INDICATION: Generalized  abdominal pain.  TECHNIQUE: Multiple axial CT imaging is obtained of the abdomen and pelvis following the administration of intravenous contrast.  The radiation dose reduction device was turned on for each scan per the ALARA (As Low as Reasonably Achievable) protocol.  COMPARISON: None.  FINDINGS: Lung bases are clear. Small retrocrural lymph nodes identified adjacent the aorta at the diaphragmatic hiatus. No bulky adenopathy. Liver is homogeneous in appearance. The spleen is unremarkable. The kidneys and adrenal glands are within normal limits. 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 There is adenopathy identified throughout the retroperitoneum. The largest left periaortic lymph node measures 2 cm in size. Surgical clips seen in the right upper quadrant from prior cholecystectomy. There is incomplete distention identified of the stomach. Pancreas is homogeneous in appearance. There is moderate distention identified of the right kidney. There is dilatation identified of the right ureter to the right UVJ where there is no evidence of a stone. The left kidney is unremarkable in appearance. Vascular calcification seen within the abdominal aorta and iliac vessels. No free fluid or free air.  PELVIS: Pelvic organs are unremarkable. The pelvic portion of the gastrointestinal tract reveals abnormal wall thickening seen of the rectum. A rectal malignancy cannot be excluded. There is no bulky pelvic adenopathy. No free fluid or free air. Mild distention of the bladder. No abnormal mass or fluid collections identified. Degenerative changes seen within the spine and pelvis. Curvature identified the spine with convexity to the right. Delayed imaging reveals contrast seen in the renal collecting systems bilaterally as well as within the ureters and bladder.      1. Abnormal wall thickening identified of the rectum and malignancy cannot be excluded.  Correlation to colonoscopy is recommended for further evaluation. Adenopathy identified throughout the retroperitoneum. Findings concerning for metastatic disease. 3. Negative for pneumonia at the lung bases. 4. Moderate dilatation seen of the right renal collecting system to the UVJ where there is no evidence of obstruction or obstructing lesion. Contrast seen to the bladder.  DICTATED:   04/03/2020 EDITED/ls :   04/03/2020  This report was finalized on 4/3/2020 3:31 PM by Dr. Inés Briscoe MD.      Xr Chest 1 View    Result Date: 4/4/2020   EXAMINATION: XR CHEST 1 VW - 04/03/2020  INDICATION: GI bleed, weakness.  COMPARISON: 03/06/2017  FINDINGS: Portable chest reveals heart to be enlarged. There is increased pulmonary vascularity bilaterally. No focal parenchymal opacification is present. No pleural effusion or pneumothorax. The bony structures are unremarkable. The pulmonary vascularity is within normal limits.      Prominence of the pulmonary vascularity. Heart is enlarged. No acute parenchymal disease.  DICTATED:   04/03/2020 EDITED/ls :   04/03/2020  This report was finalized on 4/4/2020 9:23 AM by Dr. Inés Briscoe MD.        ASSESSMENT/PLAN:    1.  Likely metastatic rectal cancer.  I will get a CAT scan of the chest to see if there is any disease present.  Treatment in this lady is going to be exceedingly difficult.  She has mild baseline dementia.  She lives in a nursing home.  Her performance status is  pretty poor.  I spoke to her and also her medical power of  who is her daughter.  Chemotherapy in this situation can easily shorten her life.  Options include single agent Xeloda versus considering palliative care with hospice at the nursing home itself.  It is unclear how much she understands her situation.  In the past she has been fairly adamant about not receiving radiation or chemotherapy to prolong her life according to her daughter.  So this may be the decision she  makes.        Jalen Cox MD    4/6/2020

## 2020-04-06 NOTE — PLAN OF CARE
Problem: Patient Care Overview  Goal: Plan of Care Review  Outcome: Ongoing (interventions implemented as appropriate)  Flowsheets (Taken 4/6/2020 0508)  Progress: improving  Plan of Care Reviewed With: patient  Outcome Summary: VSS. NSR. Room Air. AxOx4.  No c/o pain or NV. Rested well throughout shift. Will continue to monitor.     Problem: Fall Risk (Adult)  Goal: Absence of Fall  Outcome: Outcome(s) achieved  Flowsheets (Taken 4/6/2020 9135)  Absence of Fall: achieves outcome

## 2020-04-06 NOTE — PROGRESS NOTES
Continued Stay Note  [facility]     Patient Name: Loni Walker  MRN: [id number]  Today's Date: 4/6/2020    Admit Date: 4/3/2020    Discharge Plan     Row Name 04/06/20 1539       Plan    Plan  [facility]    Plan Comments  Attempted to meet with patient in the room to update the discharge plan, but patient was sleeping. Patient is a resident in LTC at [facility]. Per Alexa with , patient has all 14 Medicaid bedhold days available since the date of her hospital admission. Per review of chart, patient has diagnostic workups pending. Dr. Perry with [facility] following for POC/GOC. CM will f/u with patient and her daughter as POC evolves. Anticipate back to [facility], possibly with palliative/hospice.         Discharge Codes    No documentation.       Expected Discharge Date and Time     Expected Discharge Date Expected Discharge Time    Apr 9, 2020             Lakshmi Grant RN

## 2020-04-06 NOTE — PROGRESS NOTES
[facility] Medicine Services  PROGRESS NOTE    Patient Name: Loni Walker  : 1940  MRN: [id number]    Date of Admission: 4/3/2020  Primary Care Physician: Provider, No Known    Subjective   Subjective     CC:  F/U GIB    HPI:  Pt seen and examined. Nursing notes reviewed. No acute events overnight. Pt states she still feels weak, told PT she just couldn't do it today. Pt complains of pain in her legs, worse at night.     Review of Systems  Gen- No fevers, chills  CV- No chest pain, palpitations  Resp- No cough, dyspnea  GI- No N/V/D, abd pain    Objective   Objective     Vital Signs:   Temp:  [97.1 °F (36.2 °C)-99.4 °F (37.4 °C)] 97.1 °F (36.2 °C)  Heart Rate:  [55-83] 72  Resp:  [16-18] 18  BP: (163-171)/(78-91) 171/79        Physical Exam:  Constitutional: No acute distress, awake, alert  HENT: NCAT, mucous membranes moist  Respiratory: Clear to auscultation bilaterally, respiratory effort normal   Cardiovascular: RRR, +murmur, No rubs or gallops, palpable pedal pulses bilaterally  Gastrointestinal: Positive bowel sounds, soft, nontender, nondistended  Musculoskeletal: No bilateral ankle edema  Psychiatric: Appropriate affect, cooperative  Neurologic: Oriented x 3, strength symmetric in all extremities, Cranial Nerves grossly intact to confrontation, speech clear  Skin: No rashes    Results Reviewed:  Results from last 7 days   Lab Units 20  0434 20  0742 20  2341  20  1105  20  1304   WBC 10*3/mm3 11.75*  --   --   --  9.97  --  7.67   HEMOGLOBIN g/dL 10.6* 11.0* 10.4*   < > 10.1*   < > 7.8*   HEMATOCRIT % 33.7* 34.3 33.5*   < > 31.8*   < > 25.8*   PLATELETS 10*3/mm3 291  --   --   --  279  --  284   INR   --   --   --   --   --   --  1.07    < > = values in this interval not displayed.     Results from last 7 days   Lab Units 20  0434 20  0742 20  1105 20  1303   SODIUM mmol/L 139 138 139 137   POTASSIUM mmol/L 3.4* 3.4* 4.1  4.2   CHLORIDE mmol/L 102 102 102 100   CO2 mmol/L 24.0 22.0 26.0 27.0   BUN mg/dL 4* 3* 8 12   CREATININE mg/dL 0.56* 0.50* 0.60 0.82   GLUCOSE mg/dL 121* 115* 116* 127*   CALCIUM mg/dL 9.4 9.2 9.2 9.2   ALT (SGPT) U/L  --   --  6 7   AST (SGOT) U/L  --   --  14 13   TROPONIN T ng/mL  --   --   --  <0.010   PROBNP pg/mL  --   --   --  486.4     Estimated Creatinine Clearance: 49.4 mL/min (A) (by C-G formula based on SCr of 0.56 mg/dL (L)).    Microbiology Results Abnormal     None          Imaging Results (Last 24 Hours)     ** No results found for the last 24 hours. **               I have reviewed the medications:  Scheduled Meds:    amLODIPine 5 mg Oral Daily   atenolol 50 mg Oral Q12H   atorvastatin 20 mg Oral Daily   bethanechol 10 mg Oral TID   busPIRone 5 mg Oral BID   ferric gluconate 125 mg Intravenous Daily   melatonin 2.5 mg Oral Nightly   pantoprazole 40 mg Intravenous BID AC   polyethylene glycol 17 g Oral Daily   rivastigmine 1 patch Transdermal Daily   senna 1 tablet Oral Daily   sertraline 100 mg Oral Daily   sodium chloride 10 mL Intravenous Q12H   traZODone 50 mg Oral Nightly     Continuous Infusions:    sodium chloride 50 mL/hr Last Rate: 50 mL/hr (04/04/20 2100)     PRN Meds:.•  acetaminophen  •  acetaminophen  •  ALPRAZolam  •  hydrALAZINE  •  HYDROcodone-acetaminophen  •  loperamide  •  ondansetron  •  ondansetron  •  potassium chloride **OR** potassium chloride **OR** potassium chloride  •  sodium chloride  •  sodium chloride    Assessment/Plan   Assessment & Plan     Active Hospital Problems    Diagnosis  POA   • **Acute blood loss anemia [D62]  Unknown   • Rectal bleeding [K62.5]  Yes   • Mixed hyperlipidemia [E78.2]  Unknown   • Hydronephrosis [N13.30]  Unknown   • Essential hypertension [I10]  Unknown      Resolved Hospital Problems   No resolved problems to display.        Brief Hospital Course to date:  Loni Walker is a 80 y.o. female presenting with abd pain that started 2-3 days ago  with intermittent episodes of bright red blood in stool 2 weeks ago. She also states that she has had some dark stools. She reports decreased appetite and a 20 lb weight loss over the last 6 months.  CT of abd/pelvis concerning for malignancy with metastatic disease. Pt is s/p 2 units of PRBCs. Pt had colonoscopy 4/5 with Dr. Baer and found to have small mass in the rectum. Path sent and pending.    This patient's problems and plans were partially entered by my partner and updated as appropriate by me 04/06/20.    Acute blood loss anemia/rectal bleeding  20 lb weight loss over 6 months  decreased appetite  - CT of the abd: Abn wall thickening of the rectum and malignancy can not be excluded. Adenopathy identified throughout the retroperioneum, concerning for metastatic disease.   - Protonix 40 mg IV BID   - GI following, s/p colonoscopy 4/5 with rectal mass, biopsy taken. Heme/Onc to see in consult today.  - Will continue to hold Plavix until plan made with Oncology to ensure no further surgery  - B12, folate WNL  - Iron low 17, will order 3 doses of IV iron (3/3)  -Pt s/p 2 units of PRBCs. H&H improved and stable     HTN/HLD  - Continue atenolol 50 mg bid  - Norvasc 5mg resumed   -Will resume Clonidine 0.1mg BID PRN     Anxiety/Depression  - continue xanax 0.5 mg TID PRN  - continue prozac 20 mg daily     HX of CVA  -PT/OT following  - hold home plavix d/t GIB     Hydronephrosis, history of urinary retention  -Hold Ditropan  --Pt has sulfa allergy, so avoid Flomax  --Continue Bethanechol   --If no improvement, will ask Urology to evaluate  --PRN bladder scan, in/out cath    RLS  -Add Requip 0.25mg qhs    Hypokalemia  -replace per protocol    Leukocytosis  -mild, possibly reactive from procedure, repeat CBC in AM     DVT prophylaxis:  Mechanical    Disposition: I expect the patient to be discharged pending Heme/Onc eval for rectal mass and stable H&H    CODE STATUS:   Code Status and Medical Interventions:    Ordered at: 04/03/20 1920     Limited Support to NOT Include:    Antiarrhythmic Drugs    Cardioversion/Defibrillation    Intubation     Level Of Support Discussed With:    Patient     Code Status:    No CPR     Medical Interventions (Level of Support Prior to Arrest):    Limited         Electronically signed by Galilea Campos DO, 04/06/20, 10:41.

## 2020-04-07 LAB
ANION GAP SERPL CALCULATED.3IONS-SCNC: 10 MMOL/L (ref 5–15)
BUN BLD-MCNC: 7 MG/DL (ref 8–23)
BUN/CREAT SERPL: 12.1 (ref 7–25)
CALCIUM SPEC-SCNC: 9.1 MG/DL (ref 8.6–10.5)
CHLORIDE SERPL-SCNC: 103 MMOL/L (ref 98–107)
CO2 SERPL-SCNC: 25 MMOL/L (ref 22–29)
CREAT BLD-MCNC: 0.58 MG/DL (ref 0.57–1)
CYTO UR: NORMAL
DEPRECATED RDW RBC AUTO: 49.8 FL (ref 37–54)
ERYTHROCYTE [DISTWIDTH] IN BLOOD BY AUTOMATED COUNT: 15.3 % (ref 12.3–15.4)
GFR SERPL CREATININE-BSD FRML MDRD: 100 ML/MIN/1.73
GLUCOSE BLD-MCNC: 117 MG/DL (ref 65–99)
HCT VFR BLD AUTO: 32.7 % (ref 34–46.6)
HGB BLD-MCNC: 9.9 G/DL (ref 12–15.9)
LAB AP CASE REPORT: NORMAL
LAB AP CLINICAL INFORMATION: NORMAL
MCH RBC QN AUTO: 27.5 PG (ref 26.6–33)
MCHC RBC AUTO-ENTMCNC: 30.3 G/DL (ref 31.5–35.7)
MCV RBC AUTO: 90.8 FL (ref 79–97)
PATH REPORT.FINAL DX SPEC: NORMAL
PATH REPORT.GROSS SPEC: NORMAL
PLATELET # BLD AUTO: 263 10*3/MM3 (ref 140–450)
PMV BLD AUTO: 10.6 FL (ref 6–12)
POTASSIUM BLD-SCNC: 3.8 MMOL/L (ref 3.5–5.2)
RBC # BLD AUTO: 3.6 10*6/MM3 (ref 3.77–5.28)
SODIUM BLD-SCNC: 138 MMOL/L (ref 136–145)
WBC NRBC COR # BLD: 10.15 10*3/MM3 (ref 3.4–10.8)

## 2020-04-07 PROCEDURE — 85027 COMPLETE CBC AUTOMATED: CPT | Performed by: FAMILY MEDICINE

## 2020-04-07 PROCEDURE — 99232 SBSQ HOSP IP/OBS MODERATE 35: CPT | Performed by: FAMILY MEDICINE

## 2020-04-07 PROCEDURE — 99231 SBSQ HOSP IP/OBS SF/LOW 25: CPT | Performed by: INTERNAL MEDICINE

## 2020-04-07 PROCEDURE — 80048 BASIC METABOLIC PNL TOTAL CA: CPT | Performed by: FAMILY MEDICINE

## 2020-04-07 PROCEDURE — 97110 THERAPEUTIC EXERCISES: CPT | Performed by: PHYSICAL THERAPIST

## 2020-04-07 PROCEDURE — 97535 SELF CARE MNGMENT TRAINING: CPT

## 2020-04-07 PROCEDURE — 97530 THERAPEUTIC ACTIVITIES: CPT | Performed by: PHYSICAL THERAPIST

## 2020-04-07 RX ORDER — CLOPIDOGREL BISULFATE 75 MG/1
75 TABLET ORAL DAILY
Status: DISCONTINUED | OUTPATIENT
Start: 2020-04-07 | End: 2020-04-08 | Stop reason: HOSPADM

## 2020-04-07 RX ADMIN — ATENOLOL 50 MG: 50 TABLET ORAL at 08:41

## 2020-04-07 RX ADMIN — ALPRAZOLAM 0.25 MG: 0.25 TABLET ORAL at 12:52

## 2020-04-07 RX ADMIN — BUSPIRONE HYDROCHLORIDE 5 MG: 5 TABLET ORAL at 08:41

## 2020-04-07 RX ADMIN — SODIUM CHLORIDE, PRESERVATIVE FREE 10 ML: 5 INJECTION INTRAVENOUS at 20:14

## 2020-04-07 RX ADMIN — CLOPIDOGREL BISULFATE 75 MG: 75 TABLET ORAL at 12:52

## 2020-04-07 RX ADMIN — AMLODIPINE BESYLATE 5 MG: 5 TABLET ORAL at 08:41

## 2020-04-07 RX ADMIN — PANTOPRAZOLE SODIUM 40 MG: 40 INJECTION, POWDER, FOR SOLUTION INTRAVENOUS at 05:20

## 2020-04-07 RX ADMIN — ATENOLOL 50 MG: 50 TABLET ORAL at 20:14

## 2020-04-07 RX ADMIN — MELATONIN TAB 5 MG 2.5 MG: 5 TAB at 20:13

## 2020-04-07 RX ADMIN — SODIUM CHLORIDE 50 ML/HR: 9 INJECTION, SOLUTION INTRAVENOUS at 08:42

## 2020-04-07 RX ADMIN — SERTRALINE HYDROCHLORIDE 100 MG: 100 TABLET ORAL at 08:41

## 2020-04-07 RX ADMIN — ROPINIROLE 0.25 MG: 0.5 TABLET, FILM COATED ORAL at 20:13

## 2020-04-07 RX ADMIN — HYDROCODONE BITARTRATE AND ACETAMINOPHEN 1 TABLET: 10; 325 TABLET ORAL at 20:14

## 2020-04-07 RX ADMIN — BUSPIRONE HYDROCHLORIDE 5 MG: 5 TABLET ORAL at 20:13

## 2020-04-07 RX ADMIN — ALPRAZOLAM 0.25 MG: 0.25 TABLET ORAL at 20:13

## 2020-04-07 RX ADMIN — RIVASTIGMINE TRANSDERMAL SYSTEM 1 PATCH: 4.6 PATCH, EXTENDED RELEASE TRANSDERMAL at 08:43

## 2020-04-07 RX ADMIN — BETHANECHOL CHLORIDE 10 MG: 10 TABLET ORAL at 20:14

## 2020-04-07 RX ADMIN — BETHANECHOL CHLORIDE 10 MG: 10 TABLET ORAL at 08:41

## 2020-04-07 RX ADMIN — ATORVASTATIN CALCIUM 20 MG: 20 TABLET, FILM COATED ORAL at 08:42

## 2020-04-07 RX ADMIN — HYDROCODONE BITARTRATE AND ACETAMINOPHEN 1 TABLET: 10; 325 TABLET ORAL at 12:59

## 2020-04-07 RX ADMIN — TRAZODONE HYDROCHLORIDE 50 MG: 50 TABLET ORAL at 20:13

## 2020-04-07 NOTE — PROGRESS NOTES
Continued Stay Note  Psychiatric     Patient Name: Loni Walker  MRN: [id number]  Today's Date: 4/7/2020    Admit Date: 4/3/2020    Discharge Plan     Row Name 04/07/20 1517       Plan    Plan  [facility]    Patient/Family in Agreement with Plan  yes    Plan Comments Addnedum - Vlad Rosio Levin patient's daughter wants patient to return to [place] with Hospice.  Patient is not aware of this, has mild dementia.  Left VM with Alexa providing an update.  4/7/2020 1508 GORDON Jason RN     Spoke with patient.  She is anticipating discharge tomorrow back to [facility].  Updated Alexa with [facility].  Discussed with Dr. Campos, anticipate dc tomorrow.  [facility] ambulance arranged for tomorrow at noon, PCS in chartlet.     Final Discharge Disposition Code  04 - intermediate care facility        Discharge Codes    No documentation.       Expected Discharge Date and Time     Expected Discharge Date Expected Discharge Time    Apr 8, 2020             Yeimi Jason RN

## 2020-04-07 NOTE — PLAN OF CARE
Problem: Patient Care Overview  Goal: Plan of Care Review  Outcome: Ongoing (interventions implemented as appropriate)  Flowsheets (Taken 4/7/2020 5953)  Progress: no change  Plan of Care Reviewed With: patient  Outcome Summary: VSS. AxOx4, anxious at times. NSR. Room Air. Will continue to monitor.

## 2020-04-07 NOTE — PLAN OF CARE
Problem: Patient Care Overview  Goal: Interprofessional Rounds/Family Conf  4/7/2020 1602 by Rosio Levin, RN  Flowsheets (Taken 4/7/2020 1400)  Participants: advanced practice nurse; nursing; physician  Note:   New Palliative consult from Dr. Galilea Campos 4/7/2020 at 0943 for Hospice referral. Seen by Dr. Castillo on 4/7/2020 at 1405. Patient complains of abdominal pain, leg pain, foot pain, and a head ache, she feels anxious and depressed. She is eating small amounts, able to get up to BSC. Having issues with emptying her bladder. Getting I&O cath's. Goal is to return to [place] with Hospice. Please don't use word Hospice to patient, per patient she is not ready to die. Daughter request to make her mother comfortable and not let her suffer. States we don't have to use to word Hospice but have them involved in her care. Dr. Castillo notified and orders placed for Hospice. Daughter and patient was able to do face time on Zoom today. Palliative will continue to follow for support, symptoms and discharge needs.  4/7/2020 1600 by Rosio Levin, RN  Outcome: Ongoing (interventions implemented as appropriate)  Flowsheets (Taken 4/7/2020 1300)  Participants: nursing;advanced practice nurse;physician  Note:   Dr. Castillo spoke to alannah López about goals and Plan of care. Daughter wants her mother comfortable. She wants her to return to [place] with Hospice if possible.

## 2020-04-07 NOTE — PLAN OF CARE
Problem: Patient Care Overview  Goal: Interprofessional Rounds/Family Conf  Outcome: Ongoing (interventions implemented as appropriate)  Flowsheets (Taken 4/7/2020 1300)  Participants: nursing; advanced practice nurse; physician  Note:   Dr. Castillo spoke to alannah López about goals and Plan of care. Daughter wants her mother comfortable. She wants her to return to [place] with Hospice if possible.

## 2020-04-07 NOTE — PLAN OF CARE
Problem: Patient Care Overview  Goal: Plan of Care Review  Outcome: Ongoing (interventions implemented as appropriate)  Flowsheets (Taken 4/7/2020 1040)  Outcome Summary: Pt progressing slowly towards skilled PT goals.  Pt transferred supine-->sit with ModAx1, completed 2 stand pivots with ModAx2, and transferred sit-->supine with MinAx1.  Pt completed BLE ther ex without complaint - requires passive stretching of the R ankle due to PF contracture.  Continue with skilled PT to improve mobility and safety.

## 2020-04-07 NOTE — PROGRESS NOTES
"    [facility] Medicine Services  PROGRESS NOTE    Patient Name: Loni Walker  : 1940  MRN: [id number]    Date of Admission: 4/3/2020  Primary Care Physician: Provider, No Known    Subjective   Subjective     CC:  F/U rectal cancer    HPI:  Pt seen and examined. Nursing notes reviewed. No acute events overnight. Pt states Dr. Perry told her she was \"allergic to chemo/radiation.\" Pt states she understands her disease process and feels she wants to be comfortable.    Review of Systems  Gen- No fevers, chills  CV- No chest pain, palpitations  Resp- No cough, dyspnea  GI- No N/V/D, abd pain    Objective   Objective     Vital Signs:   Temp:  [98.9 °F (37.2 °C)-99.2 °F (37.3 °C)] 98.9 °F (37.2 °C)  Heart Rate:  [75-85] 85  Resp:  [18] 18  BP: (138-148)/(61-65) 148/65        Physical Exam:  Constitutional: No acute distress, awake, alert  HENT: NCAT, mucous membranes moist  Respiratory: Clear to auscultation bilaterally, respiratory effort normal   Cardiovascular: RRR, +murmur, No rubs or gallops, palpable pedal pulses bilaterally  Gastrointestinal: Positive bowel sounds, soft, nontender, nondistended  Musculoskeletal: No bilateral ankle edema  Psychiatric: Tearful, cooperative  Neurologic: Oriented x 3, strength symmetric in all extremities, Cranial Nerves grossly intact to confrontation, speech clear  Skin: No rashes    Results Reviewed:  Results from last 7 days   Lab Units 20  0423 20  0434 20  0742  20  1105  20  1304   WBC 10*3/mm3 10.15 11.75*  --   --  9.97  --  7.67   HEMOGLOBIN g/dL 9.9* 10.6* 11.0*   < > 10.1*   < > 7.8*   HEMATOCRIT % 32.7* 33.7* 34.3   < > 31.8*   < > 25.8*   PLATELETS 10*3/mm3 263 291  --   --  279  --  284   INR   --   --   --   --   --   --  1.07    < > = values in this interval not displayed.     Results from last 7 days   Lab Units 20  0423 20  1743 20  0434 20  0742 20  1105 20  1303   SODIUM " mmol/L 138  --  139 138 139 137   POTASSIUM mmol/L 3.8 4.3 3.4* 3.4* 4.1 4.2   CHLORIDE mmol/L 103  --  102 102 102 100   CO2 mmol/L 25.0  --  24.0 22.0 26.0 27.0   BUN mg/dL 7*  --  4* 3* 8 12   CREATININE mg/dL 0.58  --  0.56* 0.50* 0.60 0.82   GLUCOSE mg/dL 117*  --  121* 115* 116* 127*   CALCIUM mg/dL 9.1  --  9.4 9.2 9.2 9.2   ALT (SGPT) U/L  --   --   --   --  6 7   AST (SGOT) U/L  --   --   --   --  14 13   TROPONIN T ng/mL  --   --   --   --   --  <0.010   PROBNP pg/mL  --   --   --   --   --  486.4     Estimated Creatinine Clearance: 47.3 mL/min (by C-G formula based on SCr of 0.58 mg/dL).    Microbiology Results Abnormal     None          Imaging Results (Last 24 Hours)     Procedure Component Value Units Date/Time    CT Chest Without Contrast [[id number]] Collected:  04/06/20 1624     Updated:  04/06/20 1712    Narrative:       EXAMINATION: CT CHEST WO CONTRAST-04/06/2020:      INDICATION: Rectal cancer staging; K62.5-Hemorrhage of anus and rectum;  D64.9-Anemia, unspecified; R53.1-Weakness.      TECHNIQUE: CT chest without intravenous contrast.     The radiation dose reduction device was turned on for each scan per the  ALARA (As Low as Reasonably Achievable) protocol.     COMPARISON: CT abdomen and pelvis dated 04/03/2020.     FINDINGS: The thyroid is mildly heterogeneous in attenuation. No bulky  mediastinal adenopathy. Central airways are patent. Esophagus in normal  course and caliber. Atherosclerotic nonaneurysmal thoracic aorta.  Cardiac size borderline enlarged without pericardial effusion. Extended  lung windows demonstrate respiratory motion artifact without focal  consolidation. Area of likely nodular scarring left upper lobe 8 mm  adjacent to the fissure along with a subcentimeter focus of nodular  density right lower lobe measuring 4 mm in the superior portion. No  pleural effusion or coalescent consolidation. Degenerative changes of  the spine without aggressive osseous lesion. No soft tissue  body wall  findings of concern. The visualized portions of the upper abdomen  unremarkable status post cholecystectomy.       Impression:       Area of likely nodular scarring posteriorly within the left  upper lobe measuring 8 mm along with a subcentimeter focus 4 mm in size  in the right lower lobe superior portion of indeterminate significance  may represent postinflammatory findings or nodular scarring with  moderate degree of respiratory motion somewhat limiting evaluation.  Attention at these sites on followup to exclude for pulmonary nodularity  and to ensure stability.     D:  04/06/2020  E:  04/06/2020                        I have reviewed the medications:  Scheduled Meds:    amLODIPine 5 mg Oral Daily   atenolol 50 mg Oral Q12H   atorvastatin 20 mg Oral Daily   bethanechol 10 mg Oral TID   busPIRone 5 mg Oral BID   melatonin 2.5 mg Oral Nightly   pantoprazole 40 mg Intravenous BID AC   polyethylene glycol 17 g Oral Daily   rivastigmine 1 patch Transdermal Daily   rOPINIRole 0.25 mg Oral Nightly   senna 1 tablet Oral Daily   sertraline 100 mg Oral Daily   sodium chloride 10 mL Intravenous Q12H   traZODone 50 mg Oral Nightly     Continuous Infusions:    sodium chloride 50 mL/hr Last Rate: 50 mL/hr (04/07/20 0842)     PRN Meds:.•  acetaminophen  •  acetaminophen  •  ALPRAZolam  •  cloNIDine  •  hydrALAZINE  •  HYDROcodone-acetaminophen  •  loperamide  •  ondansetron  •  ondansetron  •  potassium chloride **OR** potassium chloride **OR** potassium chloride  •  sodium chloride  •  sodium chloride    Assessment/Plan   Assessment & Plan     Active Hospital Problems    Diagnosis  POA   • **Acute blood loss anemia [D62]  Unknown   • Rectal bleeding [K62.5]  Yes   • Mixed hyperlipidemia [E78.2]  Unknown   • Hydronephrosis [N13.30]  Unknown   • Essential hypertension [I10]  Unknown      Resolved Hospital Problems   No resolved problems to display.        Brief Hospital Course to date:  Lonibal Walker is a 80 y.o.  female presenting with abd pain that started 2-3 days ago with intermittent episodes of bright red blood in stool 2 weeks ago. She also states that she has had some dark stools. She reports decreased appetite and a 20 lb weight loss over the last 6 months.  CT of abd/pelvis concerning for malignancy with metastatic disease. Pt is s/p 2 units of PRBCs. Pt had colonoscopy 4/5 with Dr. Baer and found to have small mass in the rectum. Path sent and pending.    This patient's problems and plans were partially entered by my partner and updated as appropriate by me 04/07/20.    Acute blood loss anemia/rectal bleeding: Rectal Mass   20 lb weight loss over 6 months  decreased appetite  - CT of the abd: Abn wall thickening of the rectum and malignancy can not be excluded. Adenopathy identified throughout the retroperioneum, concerning for metastatic disease.   - Protonix 40 mg IV BID   - GI following, s/p colonoscopy 4/5 with rectal mass, biopsy taken. Path pending.  - Pt seen by Dr. Perry, feels she is a poor candidate for chemo/radiation and suggests a more palliative approach. He discussed this with the patient's daughter. I have consulted Palliative Medicine.  - Will resume Plavix today  - B12, folate WNL  - Iron low 17, s/p 3 doses of IV iron   -Pt s/p 2 units of PRBCs. H&H improved and stable     HTN/HLD  - Continue atenolol 50 mg bid  - Norvasc 5mg resumed   - Clonidine 0.1mg BID PRN resumed      Anxiety/Depression  - continue xanax 0.5 mg TID PRN  - continue prozac 20 mg daily     HX of CVA  -PT/OT following  - Resume Plavix today     Hydronephrosis, history of urinary retention  -Hold Ditropan  --Pt has sulfa allergy, so avoid Flomax  --Continue Bethanechol   --PRN bladder scan, in/out cath    RLS  -Continue Requip 0.25mg qhs    Hypokalemia  -replaced per protocol, resolved    Leukocytosis  - reactive from procedure and resolved     DVT prophylaxis:  Mechanical    Disposition: I expect the patient to be discharged  back to [place] possibly with Palliative and/or hospice. Possibly tomorrow. Discussed with CM today.    Attempted to contact daughter today, did not answer.     CODE STATUS:   Code Status and Medical Interventions:   Ordered at: 04/03/20 1920     Limited Support to NOT Include:    Antiarrhythmic Drugs    Cardioversion/Defibrillation    Intubation     Level Of Support Discussed With:    Patient     Code Status:    No CPR     Medical Interventions (Level of Support Prior to Arrest):    Limited         Electronically signed by Galilea Campos DO, 04/07/20, 10:34.

## 2020-04-07 NOTE — PROGRESS NOTES
Bx positive for squamous cell carcinoma.  If bleeding becomes an issue consider low dose XRT  I will sign off

## 2020-04-07 NOTE — CONSULTS
Provider, No Known  Consulting physician: Andres    Chief Complaint   Patient presents with   • Abdominal Pain         HPI: Patient is a 80-year-old female with a diagnosis of rectal/colon cancer.  Patient has been evaluated by oncology and is not deemed to be a candidate for any type of treatment including radiation or chemotherapy.    At this point time the patient states that she is not having any symptoms, however does state that she just got pain medication and prior to this she was having some epigastric/abdominal pain which was diffuse in nature.  Does feel that the pain medicine as well as anxiety medicine is sufficient to control her symptoms for present.    On top of this nursing staff does note that patient is having some urinary retention, requiring the patient to be in and out catheter.      Past Medical History:   Diagnosis Date   • Anxiety    • Arthritis    • Bipolar disorder (CMS/[facility])    • CAD (coronary artery disease)    • Carotid stenosis    • Cataracts, bilateral    • Contracture of ankle, right    • Coronary artery disease    • CVA (cerebral vascular accident) (CMS/HCC)    • Dementia with behavioral disturbance (CMS/[facility])    • Depression    • Dysphasia    • Generalized weakness    • GERD (gastroesophageal reflux disease)    • Hx of migraines    • Hyperlipidemia    • Hypertension    • Insomnia    • Osteoarthritis    • Overactive bladder      Past Surgical History:   Procedure Laterality Date   • ABDOMINAL SURGERY     • CHOLECYSTECTOMY     • COLONOSCOPY N/A 4/5/2020    Procedure: COLONOSCOPY;  Surgeon: Celestine Baer MD;  Location: [facility] ENDOSCOPY;  Service: Gastroenterology;  Laterality: N/A;   • HYSTERECTOMY       Current Code Status     Date Active Code Status Order ID Comments User Context       4/3/2020 1920 No CPR [id number]  Giana Lewis APRN Inpatient       Questions for Current Code Status     Question Answer Comment    Code Status No CPR     Medical Interventions (Level of Support  Prior to Arrest) Limited     Limited Support to NOT Include Antiarrhythmic Drugs      Cardioversion/Defibrillation      Intubation     Level Of Support Discussed With Patient         Current Facility-Administered Medications   Medication Dose Route Frequency Provider Last Rate Last Dose   • acetaminophen (TYLENOL) tablet 500 mg  500 mg Oral Q4H PRN Celestine Baer MD   500 mg at 04/06/20 0453   • acetaminophen (TYLENOL) tablet 650 mg  650 mg Oral Q6H PRN Celestine Baer MD   650 mg at 04/05/20 0159   • ALPRAZolam (XANAX) tablet 0.25 mg  0.25 mg Oral TID PRN Celestine Baer MD   0.25 mg at 04/07/20 1252   • amLODIPine (NORVASC) tablet 5 mg  5 mg Oral Daily Celestine Baer MD   5 mg at 04/07/20 0841   • atenolol (TENORMIN) tablet 50 mg  50 mg Oral Q12H Celestine Baer MD   50 mg at 04/07/20 0841   • atorvastatin (LIPITOR) tablet 20 mg  20 mg Oral Daily Celestine Baer MD   20 mg at 04/07/20 0842   • bethanechol (URECHOLINE) tablet 10 mg  10 mg Oral TID Galilea Campos DO   10 mg at 04/07/20 0841   • busPIRone (BUSPAR) tablet 5 mg  5 mg Oral BID Celestine Baer MD   5 mg at 04/07/20 0841   • cloNIDine (CATAPRES) tablet 0.1 mg  0.1 mg Oral Q12H PRN Galilea Campos, DO       • clopidogrel (PLAVIX) tablet 75 mg  75 mg Oral Daily Galilea Campos DO   75 mg at 04/07/20 1252   • hydrALAZINE (APRESOLINE) injection 10 mg  10 mg Intravenous Q6H PRN Celestine Baer MD       • HYDROcodone-acetaminophen (NORCO)  MG per tablet 1 tablet  1 tablet Oral Q6H PRN Celestine Baer MD   1 tablet at 04/07/20 1259   • loperamide (IMODIUM) capsule 2 mg  2 mg Oral Q6H PRN Celestine Baer MD       • melatonin tablet 2.5 mg  2.5 mg Oral Nightly Celestine Baer MD   2.5 mg at 04/06/20 2051   • ondansetron (ZOFRAN) injection 4 mg  4 mg Intravenous Q6H PRN Celestine Baer MD       • ondansetron (ZOFRAN) tablet 4 mg  4 mg Oral Q6H PRN Celestine Baer MD       • pantoprazole (PROTONIX) injection 40 mg  40 mg Intravenous  BID AC Celestine Baer MD   40 mg at 04/07/20 0520   • polyethylene glycol 3350 powder (packet)  17 g Oral Daily Celestine Baer MD   17 g at 04/06/20 0812   • potassium chloride (MICRO-K) CR capsule 40 mEq  40 mEq Oral PRN Galilea Campos, DO   40 mEq at 04/06/20 1336    Or   • potassium chloride (KLOR-CON) packet 40 mEq  40 mEq Oral PRN Galilea Campos, DO        Or   • potassium chloride 10 mEq in 100 mL IVPB  10 mEq Intravenous Q1H PRN Galilea Campos, DO       • rivastigmine (EXELON) 4.6 MG/24HR patch 1 patch  1 patch Transdermal Daily Celestine Baer MD   1 patch at 04/07/20 0843   • rOPINIRole (REQUIP) tablet 0.25 mg  0.25 mg Oral Nightly Galilea Campos DO   0.25 mg at 04/06/20 2048   • senna (SENOKOT) tablet 1 tablet  1 tablet Oral Daily Celestine Baer MD   1 tablet at 04/06/20 0811   • sertraline (ZOLOFT) tablet 100 mg  100 mg Oral Daily Celestine Baer MD   100 mg at 04/07/20 0841   • sodium chloride 0.9 % flush 10 mL  10 mL Intravenous PRN Celestine Baer MD       • sodium chloride 0.9 % flush 10 mL  10 mL Intravenous Q12H Celestine Baer MD   10 mL at 04/06/20 0812   • sodium chloride 0.9 % flush 10 mL  10 mL Intravenous PRN Celestine Baer MD       • sodium chloride 0.9 % infusion  50 mL/hr Intravenous Continuous Celestine Baer MD 50 mL/hr at 04/07/20 0842 50 mL/hr at 04/07/20 0842   • traZODone (DESYREL) tablet 50 mg  50 mg Oral Nightly Celestine Baer MD   50 mg at 04/06/20 2049       sodium chloride 50 mL/hr Last Rate: 50 mL/hr (04/07/20 0842)     •  acetaminophen  •  acetaminophen  •  ALPRAZolam  •  cloNIDine  •  hydrALAZINE  •  HYDROcodone-acetaminophen  •  loperamide  •  ondansetron  •  ondansetron  •  potassium chloride **OR** potassium chloride **OR** potassium chloride  •  sodium chloride  •  sodium chloride  Allergies   Allergen Reactions   • Hydrocodone Hives   • Oxycodone Hives   • Penicillins Hives   • Sulfa Antibiotics Hives   • Doxycycline    • Lyrica  "[Pregabalin]      Family History   Problem Relation Age of Onset   • Arthritis Mother    • Early death Mother    • Heart disease Father    • Hypertension Father    • Hyperlipidemia Father    • Diabetes Father    • Hypertension Daughter      Social History     Socioeconomic History   • Marital status:      Spouse name: Not on file   • Number of children: Not on file   • Years of education: Not on file   • Highest education level: Not on file   Tobacco Use   • Smoking status: Former Smoker     Packs/day: 1.00     Years: 15.00     Pack years: 15.00     Last attempt to quit: 9/21/2016     Years since quitting: 3.5   • Smokeless tobacco: Never Used   Substance and Sexual Activity   • Alcohol use: No   • Drug use: No   • Sexual activity: Never     Review of Systems -all others reviewed and found negative as mentioned in the HPI      /65 (BP Location: Left arm, Patient Position: Lying)   Pulse 85   Temp 98.9 °F (37.2 °C) (Oral)   Resp 18   Ht 149.9 cm (59\")   Wt 54.3 kg (119 lb 12.8 oz)   LMP  (LMP Unknown) Comment: Post Menopausal  SpO2 94%   BMI 24.20 kg/m²     Intake/Output Summary (Last 24 hours) at 4/7/2020 1358  Last data filed at 4/7/2020 1325  Gross per 24 hour   Intake 365 ml   Output 2042 ml   Net -1677 ml     Physical Exam:      General Appearance:    Alert, cooperative, in no acute distress   Head:    Normocephalic, without obvious abnormality, atraumatic   Eyes:            Lids and lashes normal, conjunctivae and sclerae normal, no   icterus, no pallor, corneas clear, PERRLA   Ears:    Ears appear intact with no abnormalities noted   Throat:   No oral lesions, no thrush, oral mucosa moist   Neck:   No adenopathy, supple, trachea midline, no thyromegaly, no     carotid bruit, no JVD   Back:     No kyphosis present, no scoliosis present, no skin lesions,       erythema or scars, no tenderness to percussion or                   palpation,   range of motion normal   Lungs:     Clear to " auscultation,respirations regular, even and                   unlabored    Heart:    Regular rhythm and normal rate, normal S1 and S2, no            murmur, no gallop, no rub, no click   Breast Exam:    Deferred   Abdomen:     Normal bowel sounds, no masses, no organomegaly, soft        non-tender, non-distended, no guarding, no rebound                 tenderness   Genitalia:    Deferred   Extremities:   Moves all extremities well, no edema, no cyanosis, no              redness   Pulses:   Pulses palpable and equal bilaterally   Skin:   No bleeding, bruising or rash   Lymph nodes:   No palpable adenopathy   Neurologic:   Cranial nerves 2 - 12 grossly intact, sensation intact, DTR        present and equal bilaterally       Results from last 7 days   Lab Units 04/07/20  0423   WBC 10*3/mm3 10.15   HEMOGLOBIN g/dL 9.9*   HEMATOCRIT % 32.7*   PLATELETS 10*3/mm3 263     Results from last 7 days   Lab Units 04/07/20  0423  04/04/20  1105   SODIUM mmol/L 138   < > 139   POTASSIUM mmol/L 3.8   < > 4.1   CHLORIDE mmol/L 103   < > 102   CO2 mmol/L 25.0   < > 26.0   BUN mg/dL 7*   < > 8   CREATININE mg/dL 0.58   < > 0.60   CALCIUM mg/dL 9.1   < > 9.2   BILIRUBIN mg/dL  --   --  0.6   ALK PHOS U/L  --   --  70   ALT (SGPT) U/L  --   --  6   AST (SGOT) U/L  --   --  14   GLUCOSE mg/dL 117*   < > 116*    < > = values in this interval not displayed.     Results from last 7 days   Lab Units 04/07/20  0423   SODIUM mmol/L 138   POTASSIUM mmol/L 3.8   CHLORIDE mmol/L 103   CO2 mmol/L 25.0   BUN mg/dL 7*   CREATININE mg/dL 0.58   GLUCOSE mg/dL 117*   CALCIUM mg/dL 9.1     Imaging Results (Last 72 Hours)     Procedure Component Value Units Date/Time    CT Chest Without Contrast [[id number]] Collected:  04/06/20 1624     Updated:  04/07/20 1159    Narrative:       EXAMINATION: CT CHEST WO CONTRAST-04/06/2020:      INDICATION: Rectal cancer staging; K62.5-Hemorrhage of anus and rectum;  D64.9-Anemia, unspecified; R53.1-Weakness.       TECHNIQUE: CT chest without intravenous contrast.     The radiation dose reduction device was turned on for each scan per the  ALARA (As Low as Reasonably Achievable) protocol.     COMPARISON: CT abdomen and pelvis dated 04/03/2020.     FINDINGS: The thyroid is mildly heterogeneous in attenuation. No bulky  mediastinal adenopathy. Central airways are patent. Esophagus in normal  course and caliber. Atherosclerotic nonaneurysmal thoracic aorta.  Cardiac size borderline enlarged without pericardial effusion. Extended  lung windows demonstrate respiratory motion artifact without focal  consolidation. Area of likely nodular scarring left upper lobe 8 mm  adjacent to the fissure along with a subcentimeter focus of nodular  density right lower lobe measuring 4 mm in the superior portion. No  pleural effusion or coalescent consolidation. Degenerative changes of  the spine without aggressive osseous lesion. No soft tissue body wall  findings of concern. The visualized portions of the upper abdomen  unremarkable status post cholecystectomy.       Impression:       Area of likely nodular scarring posteriorly within the left  upper lobe measuring 8 mm along with a subcentimeter focus 4 mm in size  in the right lower lobe superior portion of indeterminate significance  may represent postinflammatory findings or nodular scarring with  moderate degree of respiratory motion somewhat limiting evaluation.  Attention at these sites on followup to exclude for pulmonary nodularity  and to ensure stability.     D:  04/06/2020  E:  04/06/2020           This report was finalized on 4/7/2020 11:56 AM by Dr. Gustavo Craig.           Impression: Rectal cancer  Neoplastic pain  Anxiety  Urinary retention  GOC      Plan: Talk to the patient as well as time the patient daughter over the phone.  We discussed palliative versus hospice.  Patient does not feel that she is ready for hospice at this point time.  I would recommend palliative medicine  to follow at the nursing facility to help with that transition.  Did discuss this with the patient's daughter as well.        Jos Castillo DO  04/07/20  13:58

## 2020-04-07 NOTE — DISCHARGE PLACEMENT REQUEST
"Aaron Jimbo (80 y.o. Female)     Referred on 4/7/2020 by Dr. Galilea Campos  PCP: unknown  Diagnosis: Rectal cancer with bleeding    Date of Birth Social Security Number Address Home Phone MRN    1940  [address] [facility] [address] [phone] [id number]    Bahai Marital Status          None        Admission Date Admission Type Admitting Provider Attending Provider Department, Room/Bed    4/3/20 Emergency Galilea Campos DO Hamilton, Olivia D, DO [facility] 5H, S568/1    Discharge Date Discharge Disposition Discharge Destination                       Attending Provider:  Galilea Campos DO    Allergies:  Hydrocodone, Oxycodone, Penicillins, Sulfa Antibiotics, Doxycycline, Lyrica [Pregabalin]    Isolation:  None   Infection:  None   Code Status:  No CPR    Ht:  149.9 cm (59\")   Wt:  54.3 kg (119 lb 12.8 oz)    Admission Cmt:  None   Principal Problem:  Acute blood loss anemia [D62]                 Active Insurance as of 4/3/2020     Primary Coverage     Payor Plan Insurance Group Employer/Plan Group    MEDICARE MEDICARE A & B      Payor Plan Address Payor Plan Phone Number Payor Plan Fax Number Effective Dates    [address] [phone]  1/1/2004 - None Entered    [place] [id number]       Subscriber Name Subscriber Birth Date Member ID       JIMBO RENEE 1940 [id number]                 Emergency Contacts      (Rel.) Home Phone Work Phone Mobile Phone    Maribel López (Daughter) -- -- [phone]            Insurance Information                MEDICARE/MEDICARE A & B Phone: [phone]    Subscriber: Jimbo Renee Subscriber#: [id number]    Group#:  Precert#:           Problem List           Codes Noted - Resolved       Hospital    Rectal bleeding ICD-10-CM: K62.5  ICD-9-CM: 569.3 4/3/2020 - Present    Mixed hyperlipidemia ICD-10-CM: E78.2  ICD-9-CM: 272.2 4/3/2020 - Present    * (Principal) Acute blood loss anemia ICD-10-CM: " D62  ICD-9-CM: 285.1 4/3/2020 - Present    Hydronephrosis ICD-10-CM: N13.30  ICD-9-CM: 591 4/3/2020 - Present    Essential hypertension ICD-10-CM: I10  ICD-9-CM: 401.9 10/29/2016 - Present       Non-Hospital    History of urinary retention ICD-10-CM: Z87.898  ICD-9-CM: V13.09 3/9/2017 - Present    UTI (urinary tract infection) ICD-10-CM: N39.0  ICD-9-CM: 599.0 3/9/2017 - Present    Hypertensive emergency ICD-10-CM: I16.1  ICD-9-CM: 401.9 3/7/2017 - Present    Altered mental status, unspecified ICD-10-CM: R41.82  ICD-9-CM: 780.97 3/6/2017 - Present    CVA (cerebral vascular accident) (CMS/HCC) ICD-10-CM: I63.9  ICD-9-CM: 434.91 10/29/2016 - Present    Carotid stenosis ICD-10-CM: I65.29  ICD-9-CM: 433.10 10/29/2016 - Present    Constipation ICD-10-CM: K59.00  ICD-9-CM: 564.00 10/29/2016 - Present    Cerebrovascular accident (CVA) (CMS/HCC) ICD-10-CM: I63.9  ICD-9-CM: 434.91 10/29/2016 - Present             History & Physical      Griselda Smith DO at [address] [place] [address] Medicine Services  HISTORY AND PHYSICAL    Patient Name: Loni Walker  : 1940  MRN: [id number]  Primary Care Physician: Provider, No Known  Date of admission: 4/3/2020      Subjective   Subjective     Chief Complaint:  Rectal bleeding for 2 week with abd pain that started 2-3 days ago.      HPI:  Loni Walker is a 80 y.o. female with PmHx of CVA, HTN, Migraines, Osteoarthritis, CAD, HLD presenting to the ED with abd pain that started 2-3 days ago with intermittent episodes of bright red blood in stool 2 weeks ago. She reports decreased appetite and a 20 lb weight loss over the last 6 months. She denies nausea, vomiting, constipation, diarrhea, dizziness, fever, chest pain. She can't remember her last colonoscopy. She resides at [facility]. Facility reports H&H 6.4/19.9. No history of GI bleed. She takes plavix. Past history of smoking, quit 3 years ago. Abdominal surgery includes hysterectomy,  cholecystectomy.      Review of Systems   Gen- No fevers, chills  CV- No chest pain, palpitations  Resp- No cough, dyspnea  GI- No N/V/D (+) abd pain, decreased appetite, rectal bleeding    All other systems reviewed and are negative.     Personal History     Past Medical History:   Diagnosis Date   • Arthritis    • Carotid stenosis    • Cataracts, bilateral    • Coronary artery disease    • CVA (cerebral vascular accident) (CMS/HCC)    • Hx of migraines    • Hypertension      Past Surgical History:   Procedure Laterality Date   • ABDOMINAL SURGERY     • CHOLECYSTECTOMY     • HYSTERECTOMY       Family History: family history includes Arthritis in her mother; Diabetes in her father; Early death in her mother; Heart disease in her father; Hyperlipidemia in her father; Hypertension in her daughter and father. Otherwise pertinent FHx was reviewed and unremarkable.     Social History:  reports that she quit smoking about 3 years ago. She has a 15.00 pack-year smoking history. She has never used smokeless tobacco. She reports that she does not drink alcohol or use drugs.  Social History     Social History Narrative   • Not on file     Medications:    Available home medication information reviewed.    Allergies   Allergen Reactions   • Hydrocodone Hives   • Oxycodone Hives   • Penicillins Hives   • Sulfa Antibiotics Hives   • Doxycycline    • Lyrica [Pregabalin]      Objective   Objective     Vital Signs:   Temp:  [98.1 °F (36.7 °C)-98.3 °F (36.8 °C)] 98.3 °F (36.8 °C)  Heart Rate:  [56-81] 71  Resp:  [18-20] 20  BP: (102-135)/(40-72) 117/44      Physical Exam   Constitutional: Awake, alert, pale appearing  Eyes: PERRLA, sclerae anicteric, no conjunctival injection  HENT: NCAT, mucous membranes moist  Neck: Supple, no thyromegaly, no lymphadenopathy, trachea midline  Respiratory: Clear to auscultation bilaterally, nonlabored respirations   Cardiovascular: RRR, no murmurs, rubs, or gallops, palpable pedal pulses  bilaterally  Gastrointestinal: Positive bowel sounds, abd distention, tenderness at the suprapubic abd area  Musculoskeletal: +2 ankle edema, no clubbing or cyanosis to extremities  Psychiatric: Appropriate affect, cooperative  Neurologic: Oriented x 3, strength symmetric in all extremities, Cranial Nerves grossly intact to confrontation, speech clear  Skin: No rashes    Results Reviewed:  I have personally reviewed current lab and radiology data.    Results from last 7 days   Lab Units 04/03/20 1958 04/03/20  1304   WBC 10*3/mm3  --  7.67   HEMOGLOBIN g/dL 7.2* 7.8*   HEMATOCRIT % 23.4* 25.8*   PLATELETS 10*3/mm3  --  284   INR   --  1.07     Results from last 7 days   Lab Units 04/03/20  1303   SODIUM mmol/L 137   POTASSIUM mmol/L 4.2   CHLORIDE mmol/L 100   CO2 mmol/L 27.0   BUN mg/dL 12   CREATININE mg/dL 0.82   GLUCOSE mg/dL 127*   CALCIUM mg/dL 9.2   ALT (SGPT) U/L 7   AST (SGOT) U/L 13   TROPONIN T ng/mL <0.010   PROBNP pg/mL 486.4     Estimated Creatinine Clearance: 45.5 mL/min (by C-G formula based on SCr of 0.82 mg/dL).  Brief Urine Lab Results  (Last result in the past 365 days)      Color   Clarity   Blood   Leuk Est   Nitrite   Protein   CREAT   Urine HCG        04/03/20 1342 Yellow Clear Negative Negative Negative Negative             Imaging Results (Last 24 Hours)     Procedure Component Value Units Date/Time    XR Chest 1 View [[id number]] Collected:  04/03/20 1501     Updated:  04/03/20 1640    Narrative:          EXAMINATION: XR CHEST 1 VW - 04/03/2020     INDICATION: GI bleed, weakness.     COMPARISON: 03/06/2017     FINDINGS: Portable chest reveals heart to be enlarged. There is  increased pulmonary vascularity bilaterally. No focal parenchymal  opacification is present. No pleural effusion or pneumothorax. The bony  structures are unremarkable. The pulmonary vascularity is within normal  limits.       Impression:       Prominence of the pulmonary vascularity. Heart is enlarged.  No acute  parenchymal disease.     DICTATED:   04/03/2020  EDITED/ls :   04/03/2020           CT Abdomen Pelvis With Contrast [[id number]] Collected:  04/03/20 1431     Updated:  04/03/20 1534    Narrative:       EXAMINATION: CT ABDOMEN/PELVIS W CONTRAST - 04/03/2020      INDICATION: Generalized abdominal pain.     TECHNIQUE: Multiple axial CT imaging is obtained of the abdomen and  pelvis following the administration of intravenous contrast.      The radiation dose reduction device was turned on for each scan per the  ALARA (As Low as Reasonably Achievable) protocol.     COMPARISON: None.     FINDINGS: Lung bases are clear. Small retrocrural lymph nodes identified  adjacent the aorta at the diaphragmatic hiatus. No bulky adenopathy.  Liver is homogeneous in appearance. The spleen is unremarkable. The  kidneys and adrenal glands are within normal limits. 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 There is adenopathy identified  throughout the retroperitoneum. The largest left periaortic lymph node  measures 2 cm in size. Surgical clips seen in the right upper quadrant  from prior cholecystectomy. There is incomplete distention identified of  the stomach. Pancreas is homogeneous in appearance. There is moderate  distention identified of the right kidney. There is dilatation  identified of the right ureter to the right UVJ where there is no  evidence of a stone. The left kidney is unremarkable in appearance.  Vascular calcification seen within the abdominal aorta and iliac  vessels. No free fluid or free air.     PELVIS: Pelvic organs are unremarkable. The pelvic portion of the  gastrointestinal tract reveals abnormal wall thickening seen of the  rectum. A rectal malignancy cannot be excluded. There is no bulky pelvic  adenopathy. No free fluid or free air. Mild distention of the bladder.  No abnormal mass or fluid collections identified. Degenerative  "changes  seen within the spine and pelvis. Curvature identified the spine with  convexity to the right. Delayed imaging reveals contrast seen in the  renal collecting systems bilaterally as well as within the ureters and  bladder.       Impression:       1. Abnormal wall thickening identified of the rectum and malignancy  cannot be excluded. Correlation to colonoscopy is recommended for  further evaluation. Adenopathy identified throughout the  retroperitoneum. Findings concerning for metastatic disease.  3. Negative for pneumonia at the lung bases.  4. Moderate dilatation seen of the right renal collecting system to the  UVJ where there is no evidence of obstruction or obstructing lesion.  Contrast seen to the bladder.     DICTATED:   04/03/2020  EDITED/ls :   04/03/2020      This report was finalized on 4/3/2020 3:31 PM by Dr. Inés Briscoe MD.           Assessment/Plan   Assessment / Plan     Active Hospital Problems    Diagnosis POA   • **Acute blood loss anemia [D62] Unknown   • Rectal bleeding [K62.5] Yes   • Mixed hyperlipidemia [E78.2] Unknown   • Hydronephrosis [N13.30] Unknown   • Essential hypertension [I10] Unknown       Hospital course:  Loni Walker is a 80 y.o. female presenting with abd pain that started 2-3 days ago with intermittent episodes of bright red blood in stool 2 weeks ago. She reports decreased appetite and a 20 lb weight loss over the last 6 months. H&H 7.8/25.8. Typed and cross for transfusion if Hgb<7.0. CT of abd/pelvis concerning for malignancy with metastatic disease.     Acute blood loss anemia/rectal bleeding  20 lb weight loss over 6 months  decreased appetite  - CT of the abd: Abn wall thickening of the rectum and malignancy can not be excluded. Adenopathy identified throughout the retroperioneum, concerning for metastatic disease. Colonoscopy recommended.   - NPO  - Protonix 40 mg IV BID for now, reports \"black clots \"  - Consult gastroenterology  - H&H q 4 hours- " Transfuse for Hgb<7.0   - Presently H&H 7.8/25.8  - type and screen for possible transfusion  - hold Plavix    - iron panel, b12, folate    HTN/HLD  - continue atenolol 50 mg bid- hold for SBP<120, HR<60  - patient is normotensive, hold all other BP meds at this time  - hold atorvastatin    Anxiety/Depression  - continue xanax 0.5 mg BID PRN  - continue prozac 20 mg daily    HX of CVA  -PT/OT eval and treat  - hold home plavix d/t GIB    Hydronephrosis, history of urinary retention  - Bladder scan  -Consider urology consult if needed    DVT prophylaxis:  Compression device    CODE STATUS:    Code Status and Medical Interventions:   Ordered at: 04/03/20 1920     Limited Support to NOT Include:    Antiarrhythmic Drugs    Cardioversion/Defibrillation    Intubation     Level Of Support Discussed With:    Patient     Code Status:    No CPR     Medical Interventions (Level of Support Prior to Arrest):    Limited     Admission Status:  I believe this patient meets INPATIENT status due to GIB.  I feel patient’s risk for adverse outcomes and need for care warrant INPATIENT evaluation and I predict the patient’s care encounter to likely last beyond 2 midnights.      Electronically signed by GABRIELLE Hahn, 04/03/20, 5:13 PM.        Attending   Admission Attestation       I have seen and examined the patient, performing an independent face-to-face diagnostic evaluation with plan of care reviewed and developed with the advanced practice clinician (APC).      Brief Summary Statement:   Loni Walker is a 80 y.o. female with a PMH significant for chronic urinary retention, history of stroke who presents to the ED due to GI bleed. Patient states that for the past 2 weeks she has had recurrent episodes of rectal bleeding.  She describes blood is been bright red with occasional passing of black clots.  She has complaints of mild abdominal cramping which is relieved with bowel movements.  She has had 20 pound unintentional  weight loss over the past several months.  She denies nausea vomiting but reports alternating constipation and diarrhea.  She denies cough, shortness of breath, fever, known exposure to COVID-19.      Remainder of detailed HPI is as noted by APC and has been reviewed and/or edited by me for completeness.    Attending Physical Exam:  Constitutional: Awake, alert  Eyes: PERRLA, sclerae anicteric, no conjunctival injection  HENT: NCAT, mucous membranes moist  Neck: Supple, no thyromegaly, no lymphadenopathy, trachea midline  Respiratory: Clear to auscultation bilaterally, nonlabored respirations   Cardiovascular: RRR, no murmurs, rubs, or gallops, palpable pedal pulses bilaterally  Gastrointestinal: Positive bowel sounds, soft, nontender, nondistended  Musculoskeletal: No bilateral ankle edema, no clubbing or cyanosis to extremities  Psychiatric: Appropriate affect, cooperative  Neurologic: Oriented x 3, strength symmetric in all extremities, Cranial Nerves grossly intact to confrontation, speech clear  Skin: No rashes      Brief Assessment/Plan :  See detailed assessment and plan developed with APC which I have reviewed and/or edited for completeness.    Electronically signed by Griselda Smith DO, 04/03/20, 8:00 PM.                Electronically signed by Griselda Smith DO at 04/03/20 2115          Emergency Department Notes      Ariela Valera PA at 04/03/20 1251     Attestation signed by Kyle Hawley MD at 04/03/20 1643          For this patient encounter, I reviewed the NP or PA documentation, treatment plan, and medical decision making. Kyle Hawley MD 4/3/2020 16:43                  Subjective   Pt is a 81 yo female presenting to ED with complaints of abdominal pain and blood in stool. Pt reports noticing bright red blood in stool about 2 weeks ago. She began having lower abdominal several days ago and is worse with having bowel movements. She denies rectal pain or hx of hemorrhoids. She reports  feeling lightheaded but denies dizziness, CP, SOB, fever, cough, congestion, nausea, vomiting, diarrhea or urinary sx. She denies use of blood thinners but does take Plavix. She is a resident at [facility]. Reports of H and H of 6.4 and 19.9. Pt denies prior hx of GI bleed and can't recall last colonoscopy. She denies recent antibiotics. PMHx significant for HTN, HLD, CAD, CVA, Migraines, and Arthritis.       History provided by:  Patient, medical records and nursing home  Abdominal Pain   Pain location: lower abdomen.  Pain quality: cramping and sharp    Pain radiates to:  Does not radiate  Pain severity:  Moderate  Timing:  Intermittent  Chronicity:  New  Context: not alcohol use, not diet changes, not recent illness, not recent travel, not sick contacts, not suspicious food intake and not trauma    Relieved by:  Nothing  Worsened by:  Nothing  Ineffective treatments:  None tried  Associated symptoms: hematochezia    Associated symptoms: no chest pain, no chills, no constipation, no cough, no diarrhea, no dysuria, no fatigue, no fever, no hematuria, no melena, no nausea, no shortness of breath and no vomiting    Risk factors: no alcohol abuse and has not had multiple surgeries        Review of Systems   Constitutional: Negative for chills, fatigue and fever.   Respiratory: Negative for cough and shortness of breath.    Cardiovascular: Negative for chest pain.   Gastrointestinal: Positive for abdominal pain and hematochezia. Negative for blood in stool, constipation, diarrhea, melena, nausea, rectal pain and vomiting.   Genitourinary: Negative for difficulty urinating, dysuria, hematuria and urgency.   Musculoskeletal: Negative for arthralgias and back pain.   Neurological: Positive for weakness (generalized) and light-headedness. Negative for dizziness.   All other systems reviewed and are negative.      Past Medical History:   Diagnosis Date   • Arthritis    • Carotid stenosis    • Cataracts, bilateral    •  Coronary artery disease    • CVA (cerebral vascular accident) (CMS/HCC)    • Hx of migraines    • Hypertension        Allergies   Allergen Reactions   • Hydrocodone Hives   • Oxycodone Hives   • Penicillins Hives   • Sulfa Antibiotics Hives   • Doxycycline    • Lyrica [Pregabalin]        Past Surgical History:   Procedure Laterality Date   • ABDOMINAL SURGERY     • CHOLECYSTECTOMY     • HYSTERECTOMY         Family History   Problem Relation Age of Onset   • Arthritis Mother    • Early death Mother    • Heart disease Father    • Hypertension Father    • Hyperlipidemia Father    • Diabetes Father    • Hypertension Daughter        Social History     Socioeconomic History   • Marital status:      Spouse name: Not on file   • Number of children: Not on file   • Years of education: Not on file   • Highest education level: Not on file   Tobacco Use   • Smoking status: Former Smoker     Packs/day: 1.00     Years: 15.00     Pack years: 15.00     Last attempt to quit: 9/21/2016     Years since quitting: 3.5   • Smokeless tobacco: Never Used   Substance and Sexual Activity   • Alcohol use: No   • Drug use: No   • Sexual activity: Never           Objective   Physical Exam   Constitutional: She is oriented to person, place, and time. Vital signs are normal. She appears well-developed.   HENT:   Head: Atraumatic.   Nose: Nose normal.   Mouth/Throat: Mucous membranes are normal.   Eyes: Pupils are equal, round, and reactive to light. Conjunctivae, EOM and lids are normal.   Neck: Normal range of motion. Neck supple.   Cardiovascular: Normal rate, regular rhythm and normal heart sounds.   Pulmonary/Chest: Effort normal and breath sounds normal. She has no wheezes.   Abdominal: Soft. She exhibits no distension. There is tenderness in the suprapubic area. There is no rebound, no guarding and no CVA tenderness.   Musculoskeletal: Normal range of motion. She exhibits no edema or tenderness.   Neurological: She is alert and  oriented to person, place, and time. No sensory deficit.   Skin: Skin is warm and dry. No rash noted. No erythema.   Psychiatric: She has a normal mood and affect. Her speech is normal and behavior is normal.   Nursing note and vitals reviewed.      Procedures          ED Course  ED Course as of Apr 03 1621 Fri Apr 03, 2020   1531 Spoke with daughter Mitali who is POA and patient allowed update on ED workup. Agreeable with admission. She denies any known hx of cancer. She also reports patient receives hydrocodone at nursing home and can tolerate narcotics. Pt with hx of prior CVA and memory issues. She provided her phone number for further questions / updates. [phone]    [RT]      ED Course User Index  [RT] Ariela Valera PA      Re-examined patient several times in ED. Updated patient on ED results and plan for admission. Pt denies prior hx of cancer.     Discussed patient with Dr. Hawley who is agreeable with plan.     Discussed admission with hospitalist Dr. Worley.     Reviewed old records.     Recent Results (from the past 24 hour(s))   Comprehensive Metabolic Panel    Collection Time: 04/03/20  1:03 PM   Result Value Ref Range    Glucose 127 (H) 65 - 99 mg/dL    BUN 12 8 - 23 mg/dL    Creatinine 0.82 0.57 - 1.00 mg/dL    Sodium 137 136 - 145 mmol/L    Potassium 4.2 3.5 - 5.2 mmol/L    Chloride 100 98 - 107 mmol/L    CO2 27.0 22.0 - 29.0 mmol/L    Calcium 9.2 8.6 - 10.5 mg/dL    Total Protein 7.3 6.0 - 8.5 g/dL    Albumin 3.90 3.50 - 5.20 g/dL    ALT (SGPT) 7 1 - 33 U/L    AST (SGOT) 13 1 - 32 U/L    Alkaline Phosphatase 69 39 - 117 U/L    Total Bilirubin 0.2 0.2 - 1.2 mg/dL    eGFR Non African Amer 67 >60 mL/min/1.73    Globulin 3.4 gm/dL    A/G Ratio 1.1 g/dL    BUN/Creatinine Ratio 14.6 7.0 - 25.0    Anion Gap 10.0 5.0 - 15.0 mmol/L   Green Top (Gel)    Collection Time: 04/03/20  1:03 PM   Result Value Ref Range    Extra Tube Hold for add-ons.    Lipase    Collection Time: 04/03/20  1:03 PM   Result  Value Ref Range    Lipase 15 13 - 60 U/L   BNP    Collection Time: 04/03/20  1:03 PM   Result Value Ref Range    proBNP 486.4 5.0-1,800.0 pg/mL   Troponin    Collection Time: 04/03/20  1:03 PM   Result Value Ref Range    Troponin T <0.010 0.000 - 0.030 ng/mL   Light Blue Top    Collection Time: 04/03/20  1:04 PM   Result Value Ref Range    Extra Tube hold for add-on    Lavender Top    Collection Time: 04/03/20  1:04 PM   Result Value Ref Range    Extra Tube hold for add-on    CBC Auto Differential    Collection Time: 04/03/20  1:04 PM   Result Value Ref Range    WBC 7.67 3.40 - 10.80 10*3/mm3    RBC 2.73 (L) 3.77 - 5.28 10*6/mm3    Hemoglobin 7.8 (L) 12.0 - 15.9 g/dL    Hematocrit 25.8 (L) 34.0 - 46.6 %    MCV 94.5 79.0 - 97.0 fL    MCH 28.6 26.6 - 33.0 pg    MCHC 30.2 (L) 31.5 - 35.7 g/dL    RDW 13.8 12.3 - 15.4 %    RDW-SD 46.9 37.0 - 54.0 fl    MPV 10.6 6.0 - 12.0 fL    Platelets 284 140 - 450 10*3/mm3    Neutrophil % 75.0 42.7 - 76.0 %    Lymphocyte % 16.0 (L) 19.6 - 45.3 %    Monocyte % 6.0 5.0 - 12.0 %    Eosinophil % 2.0 0.3 - 6.2 %    Basophil % 0.7 0.0 - 1.5 %    Immature Grans % 0.3 0.0 - 0.5 %    Neutrophils, Absolute 5.76 1.70 - 7.00 10*3/mm3    Lymphocytes, Absolute 1.23 0.70 - 3.10 10*3/mm3    Monocytes, Absolute 0.46 0.10 - 0.90 10*3/mm3    Eosinophils, Absolute 0.15 0.00 - 0.40 10*3/mm3    Basophils, Absolute 0.05 0.00 - 0.20 10*3/mm3    Immature Grans, Absolute 0.02 0.00 - 0.05 10*3/mm3    nRBC 0.0 0.0 - 0.2 /100 WBC   Protime-INR    Collection Time: 04/03/20  1:04 PM   Result Value Ref Range    Protime 13.6 11.5 - 14.0 Seconds    INR 1.07 0.85 - 1.16   Gold Top - SST    Collection Time: 04/03/20  1:10 PM   Result Value Ref Range    Extra Tube Hold for add-ons.    ABO RH Specimen Verification    Collection Time: 04/03/20  1:25 PM   Result Value Ref Range    ABO Type O     RH type Positive    POCT Occult Blood, stool    Collection Time: 04/03/20  1:42 PM   Result Value Ref Range    Fecal Occult Blood  Positive (A) Negative    Lot Number [id number]     Expiration Date 8/22     DEVELOPER LOT NUMBER 1     DEVELOPER EXPIRATION DATE 1     Positive Control Positive Positive    Negative Control Negative Negative   Urinalysis With Culture If Indicated - Urine, Catheter In/Out    Collection Time: 04/03/20  1:42 PM   Result Value Ref Range    Color, UA Yellow Yellow, Straw    Appearance, UA Clear Clear    pH, UA 6.0 5.0 - 8.0    Specific Gravity, UA 1.007 1.001 - 1.030    Glucose, UA Negative Negative    Ketones, UA Negative Negative    Bilirubin, UA Negative Negative    Blood, UA Negative Negative    Protein, UA Negative Negative    Leuk Esterase, UA Negative Negative    Nitrite, UA Negative Negative    Urobilinogen, UA 0.2 E.U./dL 0.2 - 1.0 E.U./dL   Type & Screen    Collection Time: 04/03/20  3:06 PM   Result Value Ref Range    ABO Type O     RH type Positive     Antibody Screen Negative     T&S Expiration Date 4/6/2020 11:59:59 PM      Note: In addition to lab results from this visit, the labs listed above may include labs taken at another facility or during a different encounter within the last 24 hours. Please correlate lab times with ED admission and discharge times for further clarification of the services performed during this visit.    XR Chest 1 View   Preliminary Result   Prominence of the pulmonary vascularity. Heart is enlarged.   No acute parenchymal disease.              CT Abdomen Pelvis With Contrast   Final Result   1. Abnormal wall thickening identified of the rectum and malignancy   cannot be excluded. Correlation to colonoscopy is recommended for   further evaluation. Adenopathy identified throughout the   retroperitoneum. Findings concerning for metastatic disease.   3. Negative for pneumonia at the lung bases.   4. Moderate dilatation seen of the right renal collecting system to the   UVJ where there is no evidence of obstruction or obstructing lesion.   Contrast seen to the bladder.       DICTATED:    04/03/2020   EDITED/ls :   04/03/2020        This report was finalized on 4/3/2020 3:31 PM by Dr. Inés Briscoe MD.            Vitals:    04/03/20 1540 04/03/20 1545 04/03/20 1552 04/03/20 1600   BP:  135/58  120/50   BP Location:       Patient Position:       Pulse: 67  62    Resp:       Temp:       TempSrc:       SpO2: 92%  90%    Weight:       Height:         Medications   sodium chloride 0.9 % flush 10 mL (has no administration in time range)   pantoprazole (PROTONIX) injection 40 mg (40 mg Intravenous Given 4/3/20 1351)   iopamidol (ISOVUE-300) 61 % injection 100 mL (95 mL Intravenous Given 4/3/20 1418)   ondansetron (ZOFRAN) injection 4 mg (4 mg Intravenous Given 4/3/20 1523)   acetaminophen (TYLENOL) tablet 650 mg (650 mg Oral Given 4/3/20 1521)     ECG/EMG Results (last 24 hours)     ** No results found for the last 24 hours. **        ECG 12 Lead    (Results Pending)              COVID-19 RISK SCREEN     1. Has the patient been exposed to a known COVID-19 (+) person or a person under investigation (PUI) for COVID-19 infection?  -- No     2. Has the patient traveled to or are they from a Level III endemic country? --  No    3. Has the patient traveled to or are they from a local community endemic area?   --  Yes    4. Does the patient have baseline higher exposure risk such as working in healthcare field or currently residing in healthcare facility?  --  Yes   Helpful websites:  https://www.cdc.gov/coronavirus/2019-ncov/travelers/map-and-travel-notices.html#travel-1  [email or web address]                                        MDM    Final diagnoses:   Rectal bleeding   Anemia, unspecified type   Generalized weakness            Ariela Valera PA  04/03/20 1617       Ariela Valera PA  04/03/20 1621      Electronically signed by Kyle Hawley MD at 04/03/20 1643         Current Facility-Administered Medications   Medication Dose Route Frequency Provider Last Rate Last Dose   •  acetaminophen (TYLENOL) tablet 500 mg  500 mg Oral Q4H PRN Celestine Baer MD   500 mg at 04/06/20 0453   • acetaminophen (TYLENOL) tablet 650 mg  650 mg Oral Q6H PRN Celestine Baer MD   650 mg at 04/05/20 0159   • ALPRAZolam (XANAX) tablet 0.25 mg  0.25 mg Oral TID PRN Celestine Baer MD   0.25 mg at 04/07/20 1252   • amLODIPine (NORVASC) tablet 5 mg  5 mg Oral Daily Celestine Baer MD   5 mg at 04/07/20 0841   • atenolol (TENORMIN) tablet 50 mg  50 mg Oral Q12H Celestine Baer MD   50 mg at 04/07/20 0841   • atorvastatin (LIPITOR) tablet 20 mg  20 mg Oral Daily Celestine Baer MD   20 mg at 04/07/20 0842   • bethanechol (URECHOLINE) tablet 10 mg  10 mg Oral TID Galilea Campos DO   10 mg at 04/07/20 0841   • busPIRone (BUSPAR) tablet 5 mg  5 mg Oral BID Celestine Baer MD   5 mg at 04/07/20 0841   • cloNIDine (CATAPRES) tablet 0.1 mg  0.1 mg Oral Q12H PRN Galilea Campos DO       • clopidogrel (PLAVIX) tablet 75 mg  75 mg Oral Daily Galilea Campos DO   75 mg at 04/07/20 1252   • hydrALAZINE (APRESOLINE) injection 10 mg  10 mg Intravenous Q6H PRN Celestine Baer MD       • HYDROcodone-acetaminophen (NORCO)  MG per tablet 1 tablet  1 tablet Oral Q6H PRN Celestine Baer MD   1 tablet at 04/07/20 1259   • loperamide (IMODIUM) capsule 2 mg  2 mg Oral Q6H PRN Celestine Baer MD       • melatonin tablet 2.5 mg  2.5 mg Oral Nightly Celestine Baer MD   2.5 mg at 04/06/20 2051   • ondansetron (ZOFRAN) injection 4 mg  4 mg Intravenous Q6H PRN Celestine Baer MD       • ondansetron (ZOFRAN) tablet 4 mg  4 mg Oral Q6H PRN Celestine Baer MD       • pantoprazole (PROTONIX) injection 40 mg  40 mg Intravenous BID AC Celestine Baer MD   40 mg at 04/07/20 0520   • polyethylene glycol 3350 powder (packet)  17 g Oral Daily Celestine Baer MD   17 g at 04/06/20 0812   • potassium chloride (MICRO-K) CR capsule 40 mEq  40 mEq Oral PRN Galilea Campos DO   40 mEq at 04/06/20 1336    Or   •  "potassium chloride (KLOR-CON) packet 40 mEq  40 mEq Oral PRN Galilea Campos DO        Or   • potassium chloride 10 mEq in 100 mL IVPB  10 mEq Intravenous Q1H PRN Galilea Campos DO       • rivastigmine (EXELON) 4.6 MG/24HR patch 1 patch  1 patch Transdermal Daily Celestine Baer MD   1 patch at 20 0843   • rOPINIRole (REQUIP) tablet 0.25 mg  0.25 mg Oral Nightly Galilea Campos DO   0.25 mg at 20 2048   • senna (SENOKOT) tablet 1 tablet  1 tablet Oral Daily Celestine Baer MD   1 tablet at 20 0811   • sertraline (ZOLOFT) tablet 100 mg  100 mg Oral Daily Celestine Baer MD   100 mg at 20 0841   • sodium chloride 0.9 % flush 10 mL  10 mL Intravenous PRN Celestine Baer MD       • sodium chloride 0.9 % flush 10 mL  10 mL Intravenous Q12H Celestine Baer MD   10 mL at 20 0812   • sodium chloride 0.9 % flush 10 mL  10 mL Intravenous PRN Celestine Baer MD       • traZODone (DESYREL) tablet 50 mg  50 mg Oral Nightly Celestine Baer MD   50 mg at 20 2049        Physician Progress Notes (last 72 hours) (Notes from 20 1751 through 20 1751)      Celestine Baer MD at 20 1359        Bx positive for squamous cell carcinoma.  If bleeding becomes an issue consider low dose XRT  I will sign off    Electronically signed by Celestine Baer MD at 20 1400     Galilea Campos DO at 20 1034              [facility]  PROGRESS NOTE    Patient Name: Loni Walker  : 1940  MRN: [id number]    Date of Admission: 4/3/2020  Primary Care Physician: Provider, No Known    Subjective   Subjective     CC:  F/U rectal cancer    HPI:  Pt seen and examined. Nursing notes reviewed. No acute events overnight. Pt states Dr. Perry told her she was \"allergic to chemo/radiation.\" Pt states she understands her disease process and feels she wants to be comfortable.    Review of Systems  Gen- No fevers, chills  CV- No chest " pain, palpitations  Resp- No cough, dyspnea  GI- No N/V/D, abd pain    Objective   Objective     Vital Signs:   Temp:  [98.9 °F (37.2 °C)-99.2 °F (37.3 °C)] 98.9 °F (37.2 °C)  Heart Rate:  [75-85] 85  Resp:  [18] 18  BP: (138-148)/(61-65) 148/65        Physical Exam:  Constitutional: No acute distress, awake, alert  HENT: NCAT, mucous membranes moist  Respiratory: Clear to auscultation bilaterally, respiratory effort normal   Cardiovascular: RRR, +murmur, No rubs or gallops, palpable pedal pulses bilaterally  Gastrointestinal: Positive bowel sounds, soft, nontender, nondistended  Musculoskeletal: No bilateral ankle edema  Psychiatric: Tearful, cooperative  Neurologic: Oriented x 3, strength symmetric in all extremities, Cranial Nerves grossly intact to confrontation, speech clear  Skin: No rashes    Results Reviewed:  Results from last 7 days   Lab Units 04/07/20 0423 04/06/20 0434 04/05/20  0742  04/04/20  1105  04/03/20  1304   WBC 10*3/mm3 10.15 11.75*  --   --  9.97  --  7.67   HEMOGLOBIN g/dL 9.9* 10.6* 11.0*   < > 10.1*   < > 7.8*   HEMATOCRIT % 32.7* 33.7* 34.3   < > 31.8*   < > 25.8*   PLATELETS 10*3/mm3 263 291  --   --  279  --  284   INR   --   --   --   --   --   --  1.07    < > = values in this interval not displayed.     Results from last 7 days   Lab Units 04/07/20  0423 04/06/20  1743 04/06/20  0434 04/05/20  0742 04/04/20  1105 04/03/20  1303   SODIUM mmol/L 138  --  139 138 139 137   POTASSIUM mmol/L 3.8 4.3 3.4* 3.4* 4.1 4.2   CHLORIDE mmol/L 103  --  102 102 102 100   CO2 mmol/L 25.0  --  24.0 22.0 26.0 27.0   BUN mg/dL 7*  --  4* 3* 8 12   CREATININE mg/dL 0.58  --  0.56* 0.50* 0.60 0.82   GLUCOSE mg/dL 117*  --  121* 115* 116* 127*   CALCIUM mg/dL 9.1  --  9.4 9.2 9.2 9.2   ALT (SGPT) U/L  --   --   --   --  6 7   AST (SGOT) U/L  --   --   --   --  14 13   TROPONIN T ng/mL  --   --   --   --   --  <0.010   PROBNP pg/mL  --   --   --   --   --  486.4     Estimated Creatinine Clearance: 47.3  mL/min (by C-G formula based on SCr of 0.58 mg/dL).    Microbiology Results Abnormal     None          Imaging Results (Last 24 Hours)     Procedure Component Value Units Date/Time    CT Chest Without Contrast [[id number]] Collected:  04/06/20 1624     Updated:  04/06/20 1712    Narrative:       EXAMINATION: CT CHEST WO CONTRAST-04/06/2020:      INDICATION: Rectal cancer staging; K62.5-Hemorrhage of anus and rectum;  D64.9-Anemia, unspecified; R53.1-Weakness.      TECHNIQUE: CT chest without intravenous contrast.     The radiation dose reduction device was turned on for each scan per the  ALARA (As Low as Reasonably Achievable) protocol.     COMPARISON: CT abdomen and pelvis dated 04/03/2020.     FINDINGS: The thyroid is mildly heterogeneous in attenuation. No bulky  mediastinal adenopathy. Central airways are patent. Esophagus in normal  course and caliber. Atherosclerotic nonaneurysmal thoracic aorta.  Cardiac size borderline enlarged without pericardial effusion. Extended  lung windows demonstrate respiratory motion artifact without focal  consolidation. Area of likely nodular scarring left upper lobe 8 mm  adjacent to the fissure along with a subcentimeter focus of nodular  density right lower lobe measuring 4 mm in the superior portion. No  pleural effusion or coalescent consolidation. Degenerative changes of  the spine without aggressive osseous lesion. No soft tissue body wall  findings of concern. The visualized portions of the upper abdomen  unremarkable status post cholecystectomy.       Impression:       Area of likely nodular scarring posteriorly within the left  upper lobe measuring 8 mm along with a subcentimeter focus 4 mm in size  in the right lower lobe superior portion of indeterminate significance  may represent postinflammatory findings or nodular scarring with  moderate degree of respiratory motion somewhat limiting evaluation.  Attention at these sites on followup to exclude for pulmonary  nodularity  and to ensure stability.     D:  04/06/2020  E:  04/06/2020                        I have reviewed the medications:  Scheduled Meds:    amLODIPine 5 mg Oral Daily   atenolol 50 mg Oral Q12H   atorvastatin 20 mg Oral Daily   bethanechol 10 mg Oral TID   busPIRone 5 mg Oral BID   melatonin 2.5 mg Oral Nightly   pantoprazole 40 mg Intravenous BID AC   polyethylene glycol 17 g Oral Daily   rivastigmine 1 patch Transdermal Daily   rOPINIRole 0.25 mg Oral Nightly   senna 1 tablet Oral Daily   sertraline 100 mg Oral Daily   sodium chloride 10 mL Intravenous Q12H   traZODone 50 mg Oral Nightly     Continuous Infusions:    sodium chloride 50 mL/hr Last Rate: 50 mL/hr (04/07/20 0842)     PRN Meds:.•  acetaminophen  •  acetaminophen  •  ALPRAZolam  •  cloNIDine  •  hydrALAZINE  •  HYDROcodone-acetaminophen  •  loperamide  •  ondansetron  •  ondansetron  •  potassium chloride **OR** potassium chloride **OR** potassium chloride  •  sodium chloride  •  sodium chloride    Assessment/Plan   Assessment & Plan     Active Hospital Problems    Diagnosis  POA   • **Acute blood loss anemia [D62]  Unknown   • Rectal bleeding [K62.5]  Yes   • Mixed hyperlipidemia [E78.2]  Unknown   • Hydronephrosis [N13.30]  Unknown   • Essential hypertension [I10]  Unknown      Resolved Hospital Problems   No resolved problems to display.        Brief Hospital Course to date:  Loni Walker is a 80 y.o. female presenting with abd pain that started 2-3 days ago with intermittent episodes of bright red blood in stool 2 weeks ago. She also states that she has had some dark stools. She reports decreased appetite and a 20 lb weight loss over the last 6 months.  CT of abd/pelvis concerning for malignancy with metastatic disease. Pt is s/p 2 units of PRBCs. Pt had colonoscopy 4/5 with Dr. Baer and found to have small mass in the rectum. Path sent and pending.    This patient's problems and plans were partially entered by my partner and updated as  appropriate by me 04/07/20.    Acute blood loss anemia/rectal bleeding: Rectal Mass   20 lb weight loss over 6 months  decreased appetite  - CT of the abd: Abn wall thickening of the rectum and malignancy can not be excluded. Adenopathy identified throughout the retroperioneum, concerning for metastatic disease.   - Protonix 40 mg IV BID   - GI following, s/p colonoscopy 4/5 with rectal mass, biopsy taken. Path pending.  - Pt seen by Dr. Perry, feels she is a poor candidate for chemo/radiation and suggests a more palliative approach. He discussed this with the patient's daughter. I have consulted Palliative Medicine.  - Will resume Plavix today  - B12, folate WNL  - Iron low 17, s/p 3 doses of IV iron   -Pt s/p 2 units of PRBCs. H&H improved and stable     HTN/HLD  - Continue atenolol 50 mg bid  - Norvasc 5mg resumed   - Clonidine 0.1mg BID PRN resumed      Anxiety/Depression  - continue xanax 0.5 mg TID PRN  - continue prozac 20 mg daily     HX of CVA  -PT/OT following  - Resume Plavix today     Hydronephrosis, history of urinary retention  -Hold Ditropan  --Pt has sulfa allergy, so avoid Flomax  --Continue Bethanechol   --PRN bladder scan, in/out cath    RLS  -Continue Requip 0.25mg qhs    Hypokalemia  -replaced per protocol, resolved    Leukocytosis  - reactive from procedure and resolved     DVT prophylaxis:  Mechanical    Disposition: I expect the patient to be discharged back to [place] possibly with Palliative and/or hospice. Possibly tomorrow. Discussed with CM today.    Attempted to contact daughter today, did not answer.     CODE STATUS:   Code Status and Medical Interventions:   Ordered at: 04/03/20 1920     Limited Support to NOT Include:    Antiarrhythmic Drugs    Cardioversion/Defibrillation    Intubation     Level Of Support Discussed With:    Patient     Code Status:    No CPR     Medical Interventions (Level of Support Prior to Arrest):    Limited         Electronically signed by Galilea Campos  ", 04/07/20, 10:34.        Electronically signed by Galilea Campos DO at 04/07/20 1437     Jalen Cox MD at 04/07/20 0954          S: Doing reasonably well.    Past medical history, social history and family history was reviewed and unchanged from prior visit.    Review of Systems:    Review of Systems   A comprehensive 14 point review of systems was performed and was negative except as mentioned.      Medications:  The current medication list was reviewed in the EMR    ALLERGIES:    Allergies   Allergen Reactions   • Hydrocodone Hives   • Oxycodone Hives   • Penicillins Hives   • Sulfa Antibiotics Hives   • Doxycycline    • Lyrica [Pregabalin]          Physical Exam    VITAL SIGNS:  /65 (BP Location: Left arm, Patient Position: Lying)   Pulse 85   Temp 98.9 °F (37.2 °C) (Oral)   Resp 18   Ht 149.9 cm (59\")   Wt 54.3 kg (119 lb 12.8 oz)   LMP  (LMP Unknown) Comment: Post Menopausal  SpO2 94%   BMI 24.20 kg/m²    Temp:  [98.9 °F (37.2 °C)-99.2 °F (37.3 °C)] 98.9 °F (37.2 °C)      Performance Status: 3    Physical Exam    General: elderly, in no acute distress  Extremities: no lower extremity edema  Skin: no rashes, lesions, bruising, or petechiae  Msk:  Shows  weakness of the large muscle groups  Psych: Mood is stable        RECENT LABS:    Lab Results   Component Value Date    HGB 9.9 (L) 04/07/2020    HCT 32.7 (L) 04/07/2020    MCV 90.8 04/07/2020     04/07/2020    WBC 10.15 04/07/2020    NEUTROABS 5.76 04/03/2020    LYMPHSABS 1.23 04/03/2020    MONOSABS 0.46 04/03/2020    EOSABS 0.15 04/03/2020    BASOSABS 0.05 04/03/2020       Lab Results   Component Value Date    GLUCOSE 117 (H) 04/07/2020    BUN 7 (L) 04/07/2020    CREATININE 0.58 04/07/2020     04/07/2020    K 3.8 04/07/2020     04/07/2020    CO2 25.0 04/07/2020    CALCIUM 9.1 04/07/2020    PROTEINTOT 7.4 04/04/2020    ALBUMIN 3.80 04/04/2020    BILITOT 0.6 04/04/2020    ALKPHOS 70 04/04/2020    AST 14 " 04/04/2020    ALT 6 04/04/2020     Ct Chest Without Contrast    Result Date: 4/6/2020  EXAMINATION: CT CHEST WO CONTRAST-04/06/2020:  INDICATION: Rectal cancer staging; K62.5-Hemorrhage of anus and rectum; D64.9-Anemia, unspecified; R53.1-Weakness.  TECHNIQUE: CT chest without intravenous contrast.  The radiation dose reduction device was turned on for each scan per the ALARA (As Low as Reasonably Achievable) protocol.  COMPARISON: CT abdomen and pelvis dated 04/03/2020.  FINDINGS: The thyroid is mildly heterogeneous in attenuation. No bulky mediastinal adenopathy. Central airways are patent. Esophagus in normal course and caliber. Atherosclerotic nonaneurysmal thoracic aorta. Cardiac size borderline enlarged without pericardial effusion. Extended lung windows demonstrate respiratory motion artifact without focal consolidation. Area of likely nodular scarring left upper lobe 8 mm adjacent to the fissure along with a subcentimeter focus of nodular density right lower lobe measuring 4 mm in the superior portion. No pleural effusion or coalescent consolidation. Degenerative changes of the spine without aggressive osseous lesion. No soft tissue body wall findings of concern. The visualized portions of the upper abdomen unremarkable status post cholecystectomy.      Area of likely nodular scarring posteriorly within the left upper lobe measuring 8 mm along with a subcentimeter focus 4 mm in size in the right lower lobe superior portion of indeterminate significance may represent postinflammatory findings or nodular scarring with moderate degree of respiratory motion somewhat limiting evaluation. Attention at these sites on followup to exclude for pulmonary nodularity and to ensure stability.  D:  04/06/2020 E:  04/06/2020        Ct Abdomen Pelvis With Contrast    Result Date: 4/3/2020  EXAMINATION: CT ABDOMEN/PELVIS W CONTRAST - 04/03/2020  INDICATION: Generalized abdominal pain.  TECHNIQUE: Multiple axial CT imaging is  obtained of the abdomen and pelvis following the administration of intravenous contrast.  The radiation dose reduction device was turned on for each scan per the ALARA (As Low as Reasonably Achievable) protocol.  COMPARISON: None.  FINDINGS: Lung bases are clear. Small retrocrural lymph nodes identified adjacent the aorta at the diaphragmatic hiatus. No bulky adenopathy. Liver is homogeneous in appearance. The spleen is unremarkable. The kidneys and adrenal glands are within normal limits. 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 There is adenopathy identified throughout the retroperitoneum. The largest left periaortic lymph node measures 2 cm in size. Surgical clips seen in the right upper quadrant from prior cholecystectomy. There is incomplete distention identified of the stomach. Pancreas is homogeneous in appearance. There is moderate distention identified of the right kidney. There is dilatation identified of the right ureter to the right UVJ where there is no evidence of a stone. The left kidney is unremarkable in appearance. Vascular calcification seen within the abdominal aorta and iliac vessels. No free fluid or free air.  PELVIS: Pelvic organs are unremarkable. The pelvic portion of the gastrointestinal tract reveals abnormal wall thickening seen of the rectum. A rectal malignancy cannot be excluded. There is no bulky pelvic adenopathy. No free fluid or free air. Mild distention of the bladder. No abnormal mass or fluid collections identified. Degenerative changes seen within the spine and pelvis. Curvature identified the spine with convexity to the right. Delayed imaging reveals contrast seen in the renal collecting systems bilaterally as well as within the ureters and bladder.      1. Abnormal wall thickening identified of the rectum and malignancy cannot be excluded. Correlation to colonoscopy is recommended for further evaluation.  Adenopathy identified throughout the retroperitoneum. Findings concerning for metastatic disease. 3. Negative for pneumonia at the lung bases. 4. Moderate dilatation seen of the right renal collecting system to the UVJ where there is no evidence of obstruction or obstructing lesion. Contrast seen to the bladder.  DICTATED:   2020 EDITED/ls :   2020  This report was finalized on 4/3/2020 3:31 PM by Dr. Inés Briscoe MD.      Xr Chest 1 View    Result Date: 2020   EXAMINATION: XR CHEST 1 VW - 2020  INDICATION: GI bleed, weakness.  COMPARISON: 2017  FINDINGS: Portable chest reveals heart to be enlarged. There is increased pulmonary vascularity bilaterally. No focal parenchymal opacification is present. No pleural effusion or pneumothorax. The bony structures are unremarkable. The pulmonary vascularity is within normal limits.      Prominence of the pulmonary vascularity. Heart is enlarged. No acute parenchymal disease.  DICTATED:   2020 EDITED/ls :   2020  This report was finalized on 2020 9:23 AM by Dr. Inés Briscoe MD.            Assessment/Plan    1.  Metastatic rectal cancer.  Pathology still pending.  With patient's poor performance status and dementia, chemotherapy would be very difficult.  I spoke to her daughter about her condition.  I spoke to the patient this morning.  I have recommended she consider a more palliative care approach with hospice care at the nursing home.      Jalen Cox MD  [facility] Hematology and Oncology    2020     Electronically signed by Jalen Cox MD at 20 0956     Galilea Campos DO at 20 1041              [facility] Medicine Services  PROGRESS NOTE    Patient Name: Loni Walker  : 1940  MRN: [id number]    Date of Admission: 4/3/2020  Primary Care Physician: Provider, No Known    Subjective   Subjective     CC:  F/U GIB    HPI:  Pt seen and examined.  Nursing notes reviewed. No acute events overnight. Pt states she still feels weak, told PT she just couldn't do it today. Pt complains of pain in her legs, worse at night.     Review of Systems  Gen- No fevers, chills  CV- No chest pain, palpitations  Resp- No cough, dyspnea  GI- No N/V/D, abd pain    Objective   Objective     Vital Signs:   Temp:  [97.1 °F (36.2 °C)-99.4 °F (37.4 °C)] 97.1 °F (36.2 °C)  Heart Rate:  [55-83] 72  Resp:  [16-18] 18  BP: (163-171)/(78-91) 171/79        Physical Exam:  Constitutional: No acute distress, awake, alert  HENT: NCAT, mucous membranes moist  Respiratory: Clear to auscultation bilaterally, respiratory effort normal   Cardiovascular: RRR, +murmur, No rubs or gallops, palpable pedal pulses bilaterally  Gastrointestinal: Positive bowel sounds, soft, nontender, nondistended  Musculoskeletal: No bilateral ankle edema  Psychiatric: Appropriate affect, cooperative  Neurologic: Oriented x 3, strength symmetric in all extremities, Cranial Nerves grossly intact to confrontation, speech clear  Skin: No rashes    Results Reviewed:  Results from last 7 days   Lab Units 04/06/20 0434 04/05/20 0742 04/04/20  2341  04/04/20  1105 04/03/20  1304   WBC 10*3/mm3 11.75*  --   --   --  9.97  --  7.67   HEMOGLOBIN g/dL 10.6* 11.0* 10.4*   < > 10.1*   < > 7.8*   HEMATOCRIT % 33.7* 34.3 33.5*   < > 31.8*   < > 25.8*   PLATELETS 10*3/mm3 291  --   --   --  279  --  284   INR   --   --   --   --   --   --  1.07    < > = values in this interval not displayed.     Results from last 7 days   Lab Units 04/06/20 0434 04/05/20  0742 04/04/20  1105 04/03/20  1303   SODIUM mmol/L 139 138 139 137   POTASSIUM mmol/L 3.4* 3.4* 4.1 4.2   CHLORIDE mmol/L 102 102 102 100   CO2 mmol/L 24.0 22.0 26.0 27.0   BUN mg/dL 4* 3* 8 12   CREATININE mg/dL 0.56* 0.50* 0.60 0.82   GLUCOSE mg/dL 121* 115* 116* 127*   CALCIUM mg/dL 9.4 9.2 9.2 9.2   ALT (SGPT) U/L  --   --  6 7   AST (SGOT) U/L  --   --  14 13   TROPONIN T  ng/mL  --   --   --  <0.010   PROBNP pg/mL  --   --   --  486.4     Estimated Creatinine Clearance: 49.4 mL/min (A) (by C-G formula based on SCr of 0.56 mg/dL (L)).    Microbiology Results Abnormal     None          Imaging Results (Last 24 Hours)     ** No results found for the last 24 hours. **               I have reviewed the medications:  Scheduled Meds:    amLODIPine 5 mg Oral Daily   atenolol 50 mg Oral Q12H   atorvastatin 20 mg Oral Daily   bethanechol 10 mg Oral TID   busPIRone 5 mg Oral BID   ferric gluconate 125 mg Intravenous Daily   melatonin 2.5 mg Oral Nightly   pantoprazole 40 mg Intravenous BID AC   polyethylene glycol 17 g Oral Daily   rivastigmine 1 patch Transdermal Daily   senna 1 tablet Oral Daily   sertraline 100 mg Oral Daily   sodium chloride 10 mL Intravenous Q12H   traZODone 50 mg Oral Nightly     Continuous Infusions:    sodium chloride 50 mL/hr Last Rate: 50 mL/hr (04/04/20 2100)     PRN Meds:.•  acetaminophen  •  acetaminophen  •  ALPRAZolam  •  hydrALAZINE  •  HYDROcodone-acetaminophen  •  loperamide  •  ondansetron  •  ondansetron  •  potassium chloride **OR** potassium chloride **OR** potassium chloride  •  sodium chloride  •  sodium chloride    Assessment/Plan   Assessment & Plan     Active Hospital Problems    Diagnosis  POA   • **Acute blood loss anemia [D62]  Unknown   • Rectal bleeding [K62.5]  Yes   • Mixed hyperlipidemia [E78.2]  Unknown   • Hydronephrosis [N13.30]  Unknown   • Essential hypertension [I10]  Unknown      Resolved Hospital Problems   No resolved problems to display.        Brief Hospital Course to date:  Loni Walker is a 80 y.o. female presenting with abd pain that started 2-3 days ago with intermittent episodes of bright red blood in stool 2 weeks ago. She also states that she has had some dark stools. She reports decreased appetite and a 20 lb weight loss over the last 6 months.  CT of abd/pelvis concerning for malignancy with metastatic disease. Pt is s/p 2  units of PRBCs. Pt had colonoscopy 4/5 with Dr. Baer and found to have small mass in the rectum. Path sent and pending.    This patient's problems and plans were partially entered by my partner and updated as appropriate by me 04/06/20.    Acute blood loss anemia/rectal bleeding  20 lb weight loss over 6 months  decreased appetite  - CT of the abd: Abn wall thickening of the rectum and malignancy can not be excluded. Adenopathy identified throughout the retroperioneum, concerning for metastatic disease.   - Protonix 40 mg IV BID   - GI following, s/p colonoscopy 4/5 with rectal mass, biopsy taken. Heme/Onc to see in consult today.  - Will continue to hold Plavix until plan made with Oncology to ensure no further surgery  - B12, folate WNL  - Iron low 17, will order 3 doses of IV iron (3/3)  -Pt s/p 2 units of PRBCs. H&H improved and stable     HTN/HLD  - Continue atenolol 50 mg bid  - Norvasc 5mg resumed   -Will resume Clonidine 0.1mg BID PRN     Anxiety/Depression  - continue xanax 0.5 mg TID PRN  - continue prozac 20 mg daily     HX of CVA  -PT/OT following  - hold home plavix d/t GIB     Hydronephrosis, history of urinary retention  -Hold Ditropan  --Pt has sulfa allergy, so avoid Flomax  --Continue Bethanechol   --If no improvement, will ask Urology to evaluate  --PRN bladder scan, in/out cath    RLS  -Add Requip 0.25mg qhs    Hypokalemia  -replace per protocol    Leukocytosis  -mild, possibly reactive from procedure, repeat CBC in AM     DVT prophylaxis:  Mechanical    Disposition: I expect the patient to be discharged pending Heme/Onc eval for rectal mass and stable H&H    CODE STATUS:   Code Status and Medical Interventions:   Ordered at: 04/03/20 1920     Limited Support to NOT Include:    Antiarrhythmic Drugs    Cardioversion/Defibrillation    Intubation     Level Of Support Discussed With:    Patient     Code Status:    No CPR     Medical Interventions (Level of Support Prior to Arrest):    Limited          Electronically signed by Galilea Campos DO, 20, 10:41.        Electronically signed by Galilea Campos DO at 20 1049     Galilea Campos DO at 20 1117              [facility] Medicine Services  PROGRESS NOTE    Patient Name: Loni Walker  : 1940  MRN: [id number]    Date of Admission: 4/3/2020  Primary Care Physician: Provider, No Known    Subjective   Subjective     CC:  F/U GIB    HPI:  Pt seen and examined. Nursing notes reviewed. Pt states she continues to feel weak. She had a colonoscopy tomorrow and found to have a mass in the rectum. Pt tearful on exam.     Review of Systems  Gen- No fevers, chills  CV- No chest pain, palpitations  Resp- No cough, dyspnea  GI- No N/V/D, abd pain    Objective   Objective     Vital Signs:   Temp:  [97.2 °F (36.2 °C)-99.3 °F (37.4 °C)] 97.7 °F (36.5 °C)  Heart Rate:  [69-84] 81  Resp:  [14-18] 14  BP: (125-166)/(40-91) 160/63        Physical Exam:  Constitutional: No acute distress, awake, alert  HENT: NCAT, mucous membranes moist  Respiratory: Clear to auscultation bilaterally, respiratory effort normal   Cardiovascular: RRR, +murmur, No rubs or gallops, palpable pedal pulses bilaterally  Gastrointestinal: Positive bowel sounds, soft, nontender, nondistended  Musculoskeletal: No bilateral ankle edema  Psychiatric: Anxious/tearful, cooperative  Neurologic: Oriented x 3, strength symmetric in all extremities, Cranial Nerves grossly intact to confrontation, speech clear  Skin: No rashes    Results Reviewed:  Results from last 7 days   Lab Units 20  0742 20  2341 20  1830 20  1105  20  1304   WBC 10*3/mm3  --   --   --  9.97  --  7.67   HEMOGLOBIN g/dL 11.0* 10.4* 10.2* 10.1*   < > 7.8*   HEMATOCRIT % 34.3 33.5* 32.2* 31.8*   < > 25.8*   PLATELETS 10*3/mm3  --   --   --  279  --  284   INR   --   --   --   --   --  1.07    < > = values in this interval not displayed.     Results from  last 7 days   Lab Units 04/05/20  0742 04/04/20  1105 04/03/20  1303   SODIUM mmol/L 138 139 137   POTASSIUM mmol/L 3.4* 4.1 4.2   CHLORIDE mmol/L 102 102 100   CO2 mmol/L 22.0 26.0 27.0   BUN mg/dL 3* 8 12   CREATININE mg/dL 0.50* 0.60 0.82   GLUCOSE mg/dL 115* 116* 127*   CALCIUM mg/dL 9.2 9.2 9.2   ALT (SGPT) U/L  --  6 7   AST (SGOT) U/L  --  14 13   TROPONIN T ng/mL  --   --  <0.010   PROBNP pg/mL  --   --  486.4     Estimated Creatinine Clearance: 49.5 mL/min (A) (by C-G formula based on SCr of 0.5 mg/dL (L)).    Microbiology Results Abnormal     None          Imaging Results (Last 24 Hours)     ** No results found for the last 24 hours. **               I have reviewed the medications:  Scheduled Meds:    amLODIPine 5 mg Oral Daily   atenolol 50 mg Oral Q12H   atorvastatin 20 mg Oral Daily   busPIRone 5 mg Oral BID   ferric gluconate 125 mg Intravenous Daily   melatonin 2.5 mg Oral Nightly   oxybutynin 5 mg Oral TID   pantoprazole 40 mg Intravenous BID AC   polyethylene glycol 17 g Oral Daily   rivastigmine 1 patch Transdermal Daily   senna 1 tablet Oral Daily   sertraline 100 mg Oral Daily   sodium chloride 10 mL Intravenous Q12H   traZODone 50 mg Oral Nightly     Continuous Infusions:    sodium chloride 50 mL/hr Last Rate: 50 mL/hr (04/04/20 2100)     PRN Meds:.•  acetaminophen  •  acetaminophen  •  ALPRAZolam  •  hydrALAZINE  •  HYDROcodone-acetaminophen  •  loperamide  •  ondansetron  •  ondansetron  •  sodium chloride  •  sodium chloride    Assessment/Plan   Assessment & Plan     Active Hospital Problems    Diagnosis  POA   • **Acute blood loss anemia [D62]  Unknown   • Rectal bleeding [K62.5]  Yes   • Mixed hyperlipidemia [E78.2]  Unknown   • Hydronephrosis [N13.30]  Unknown   • Essential hypertension [I10]  Unknown      Resolved Hospital Problems   No resolved problems to display.        Brief Hospital Course to date:  Loni Walker is a 80 y.o. female presenting with abd pain that started 2-3 days ago  with intermittent episodes of bright red blood in stool 2 weeks ago. She also states that she has had some dark stools. She reports decreased appetite and a 20 lb weight loss over the last 6 months.  CT of abd/pelvis concerning for malignancy with metastatic disease. Pt is s/p 2 units of PRBCs. Pt had colonoscopy 4/5 with Dr. Baer and found to have small mass in the rectum. Path sent and pending.    This patient's problems and plans were partially entered by my partner and updated as appropriate by me 04/05/20.    Acute blood loss anemia/rectal bleeding  20 lb weight loss over 6 months  decreased appetite  - CT of the abd: Abn wall thickening of the rectum and malignancy can not be excluded. Adenopathy identified throughout the retroperioneum, concerning for metastatic disease.   - Protonix 40 mg IV BID   - GI following, s/p colonoscopy 4/5 with rectal mass, biopsy taken. Heme/Onc consulted for the AM  - hold Plavix    - B12, folate WNL  - Iron low 17, will order 3 doses of IV iron (2/3)  -Pt s/p 2 units of PRBCs. H&H improved.     HTN/HLD  - Continue atenolol 50 mg bid  - Norvasc 5mg resumed      Anxiety/Depression  - continue xanax 0.5 mg TID PRN  - continue prozac 20 mg daily     HX of CVA  -PT/OT to eval and treat  - hold home plavix d/t GIB     Hydronephrosis, history of urinary retention  -FC placed yesterday, will remove today and see if patient can urinate   -Hold Ditropan  --Pt has sulfa allergy, so avoid Flomax  --Add Bethanechol   --If no improvement, will ask Urology to evaluate  --PRN bladder scan, in/out cath     DVT prophylaxis:  Mechanical    Disposition: I expect the patient to be discharged pending Heme/Onc eval for rectal mass and stable H&H    CODE STATUS:   Code Status and Medical Interventions:   Ordered at: 04/03/20 1920     Limited Support to NOT Include:    Antiarrhythmic Drugs    Cardioversion/Defibrillation    Intubation     Level Of Support Discussed With:    Patient     Code Status:     No CPR     Medical Interventions (Level of Support Prior to Arrest):    Limited         Electronically signed by Galilea Campos DO, 04/05/20, 11:17.        Electronically signed by Galilea Campos DO at 04/05/20 1129          Consult Notes (last 72 hours) (Notes from 04/04/20 1751 through 04/07/20 1751)      Jos Castillo DO at 04/07/20 1358      Consult Orders    1. Inpatient Palliative Care MD Consult [[id number]] ordered by Galilea Campos DO at 04/07/20 0943                Provider, No Known  Consulting physician: Andres    Chief Complaint   Patient presents with   • Abdominal Pain         HPI: Patient is a 80-year-old female with a diagnosis of rectal/colon cancer.  Patient has been evaluated by oncology and is not deemed to be a candidate for any type of treatment including radiation or chemotherapy.    At this point time the patient states that she is not having any symptoms, however does state that she just got pain medication and prior to this she was having some epigastric/abdominal pain which was diffuse in nature.  Does feel that the pain medicine as well as anxiety medicine is sufficient to control her symptoms for present.    On top of this nursing staff does note that patient is having some urinary retention, requiring the patient to be in and out catheter.      Past Medical History:   Diagnosis Date   • Anxiety    • Arthritis    • Bipolar disorder (CMS/HCC)    • CAD (coronary artery disease)    • Carotid stenosis    • Cataracts, bilateral    • Contracture of ankle, right    • Coronary artery disease    • CVA (cerebral vascular accident) (CMS/HCC)    • Dementia with behavioral disturbance (CMS/HCC)    • Depression    • Dysphasia    • Generalized weakness    • GERD (gastroesophageal reflux disease)    • Hx of migraines    • Hyperlipidemia    • Hypertension    • Insomnia    • Osteoarthritis    • Overactive bladder      Past Surgical History:   Procedure Laterality Date   • ABDOMINAL  SURGERY     • CHOLECYSTECTOMY     • COLONOSCOPY N/A 4/5/2020    Procedure: COLONOSCOPY;  Surgeon: Celestine Baer MD;  Location: [facility] ENDOSCOPY;  Service: Gastroenterology;  Laterality: N/A;   • HYSTERECTOMY       Current Code Status     Date Active Code Status Order ID Comments User Context       4/3/2020 1920 No CPR [id number]  Giana Lewis APRN Inpatient       Questions for Current Code Status     Question Answer Comment    Code Status No CPR     Medical Interventions (Level of Support Prior to Arrest) Limited     Limited Support to NOT Include Antiarrhythmic Drugs      Cardioversion/Defibrillation      Intubation     Level Of Support Discussed With Patient         Current Facility-Administered Medications   Medication Dose Route Frequency Provider Last Rate Last Dose   • acetaminophen (TYLENOL) tablet 500 mg  500 mg Oral Q4H PRN Celestine Baer MD   500 mg at 04/06/20 0453   • acetaminophen (TYLENOL) tablet 650 mg  650 mg Oral Q6H PRN Celestine Baer MD   650 mg at 04/05/20 0159   • ALPRAZolam (XANAX) tablet 0.25 mg  0.25 mg Oral TID PRN Celestine Baer MD   0.25 mg at 04/07/20 1252   • amLODIPine (NORVASC) tablet 5 mg  5 mg Oral Daily Celestine Baer MD   5 mg at 04/07/20 0841   • atenolol (TENORMIN) tablet 50 mg  50 mg Oral Q12H Celestine Baer MD   50 mg at 04/07/20 0841   • atorvastatin (LIPITOR) tablet 20 mg  20 mg Oral Daily Celestine Baer MD   20 mg at 04/07/20 0842   • bethanechol (URECHOLINE) tablet 10 mg  10 mg Oral TID Galilea Campos DO   10 mg at 04/07/20 0841   • busPIRone (BUSPAR) tablet 5 mg  5 mg Oral BID Celestine Baer MD   5 mg at 04/07/20 0841   • cloNIDine (CATAPRES) tablet 0.1 mg  0.1 mg Oral Q12H PRN Galilea Campos DO       • clopidogrel (PLAVIX) tablet 75 mg  75 mg Oral Daily Galilea Campos DO   75 mg at 04/07/20 1252   • hydrALAZINE (APRESOLINE) injection 10 mg  10 mg Intravenous Q6H PRN Celestine Baer MD       • HYDROcodone-acetaminophen (NORCO)  MG  per tablet 1 tablet  1 tablet Oral Q6H PRN Celestine Baer MD   1 tablet at 04/07/20 1259   • loperamide (IMODIUM) capsule 2 mg  2 mg Oral Q6H PRN Celestine Baer MD       • melatonin tablet 2.5 mg  2.5 mg Oral Nightly Celestine Baer MD   2.5 mg at 04/06/20 2051   • ondansetron (ZOFRAN) injection 4 mg  4 mg Intravenous Q6H PRN Celestine Baer MD       • ondansetron (ZOFRAN) tablet 4 mg  4 mg Oral Q6H PRN Celestine Baer MD       • pantoprazole (PROTONIX) injection 40 mg  40 mg Intravenous BID AC Celestine Baer MD   40 mg at 04/07/20 0520   • polyethylene glycol 3350 powder (packet)  17 g Oral Daily Celestine Baer MD   17 g at 04/06/20 0812   • potassium chloride (MICRO-K) CR capsule 40 mEq  40 mEq Oral PRN Galilea Campos DO   40 mEq at 04/06/20 1336    Or   • potassium chloride (KLOR-CON) packet 40 mEq  40 mEq Oral PRN Galilea Campos,         Or   • potassium chloride 10 mEq in 100 mL IVPB  10 mEq Intravenous Q1H PRN Galilea Campos,        • rivastigmine (EXELON) 4.6 MG/24HR patch 1 patch  1 patch Transdermal Daily Celestine Baer MD   1 patch at 04/07/20 0843   • rOPINIRole (REQUIP) tablet 0.25 mg  0.25 mg Oral Nightly Galilea Campos DO   0.25 mg at 04/06/20 2048   • senna (SENOKOT) tablet 1 tablet  1 tablet Oral Daily Celestine Baer MD   1 tablet at 04/06/20 0811   • sertraline (ZOLOFT) tablet 100 mg  100 mg Oral Daily Celestine Baer MD   100 mg at 04/07/20 0841   • sodium chloride 0.9 % flush 10 mL  10 mL Intravenous PRN Celestine Baer MD       • sodium chloride 0.9 % flush 10 mL  10 mL Intravenous Q12H Celestine Baer MD   10 mL at 04/06/20 0812   • sodium chloride 0.9 % flush 10 mL  10 mL Intravenous PRN Celestine Baer MD       • sodium chloride 0.9 % infusion  50 mL/hr Intravenous Continuous Celestine Baer MD 50 mL/hr at 04/07/20 0842 50 mL/hr at 04/07/20 0842   • traZODone (DESYREL) tablet 50 mg  50 mg Oral Nightly Celestine Baer MD   50 mg at 04/06/20 2043  "      sodium chloride 50 mL/hr Last Rate: 50 mL/hr (04/07/20 0842)     •  acetaminophen  •  acetaminophen  •  ALPRAZolam  •  cloNIDine  •  hydrALAZINE  •  HYDROcodone-acetaminophen  •  loperamide  •  ondansetron  •  ondansetron  •  potassium chloride **OR** potassium chloride **OR** potassium chloride  •  sodium chloride  •  sodium chloride  Allergies   Allergen Reactions   • Hydrocodone Hives   • Oxycodone Hives   • Penicillins Hives   • Sulfa Antibiotics Hives   • Doxycycline    • Lyrica [Pregabalin]      Family History   Problem Relation Age of Onset   • Arthritis Mother    • Early death Mother    • Heart disease Father    • Hypertension Father    • Hyperlipidemia Father    • Diabetes Father    • Hypertension Daughter      Social History     Socioeconomic History   • Marital status:      Spouse name: Not on file   • Number of children: Not on file   • Years of education: Not on file   • Highest education level: Not on file   Tobacco Use   • Smoking status: Former Smoker     Packs/day: 1.00     Years: 15.00     Pack years: 15.00     Last attempt to quit: 9/21/2016     Years since quitting: 3.5   • Smokeless tobacco: Never Used   Substance and Sexual Activity   • Alcohol use: No   • Drug use: No   • Sexual activity: Never     Review of Systems -all others reviewed and found negative as mentioned in the HPI      /65 (BP Location: Left arm, Patient Position: Lying)   Pulse 85   Temp 98.9 °F (37.2 °C) (Oral)   Resp 18   Ht 149.9 cm (59\")   Wt 54.3 kg (119 lb 12.8 oz)   LMP  (LMP Unknown) Comment: Post Menopausal  SpO2 94%   BMI 24.20 kg/m²      Intake/Output Summary (Last 24 hours) at 4/7/2020 1358  Last data filed at 4/7/2020 1325  Gross per 24 hour   Intake 365 ml   Output 2042 ml   Net -1677 ml     Physical Exam:      General Appearance:    Alert, cooperative, in no acute distress   Head:    Normocephalic, without obvious abnormality, atraumatic   Eyes:            Lids and lashes normal, " conjunctivae and sclerae normal, no   icterus, no pallor, corneas clear, PERRLA   Ears:    Ears appear intact with no abnormalities noted   Throat:   No oral lesions, no thrush, oral mucosa moist   Neck:   No adenopathy, supple, trachea midline, no thyromegaly, no     carotid bruit, no JVD   Back:     No kyphosis present, no scoliosis present, no skin lesions,       erythema or scars, no tenderness to percussion or                   palpation,   range of motion normal   Lungs:     Clear to auscultation,respirations regular, even and                   unlabored    Heart:    Regular rhythm and normal rate, normal S1 and S2, no            murmur, no gallop, no rub, no click   Breast Exam:    Deferred   Abdomen:     Normal bowel sounds, no masses, no organomegaly, soft        non-tender, non-distended, no guarding, no rebound                 tenderness   Genitalia:    Deferred   Extremities:   Moves all extremities well, no edema, no cyanosis, no              redness   Pulses:   Pulses palpable and equal bilaterally   Skin:   No bleeding, bruising or rash   Lymph nodes:   No palpable adenopathy   Neurologic:   Cranial nerves 2 - 12 grossly intact, sensation intact, DTR        present and equal bilaterally       Results from last 7 days   Lab Units 04/07/20  0423   WBC 10*3/mm3 10.15   HEMOGLOBIN g/dL 9.9*   HEMATOCRIT % 32.7*   PLATELETS 10*3/mm3 263     Results from last 7 days   Lab Units 04/07/20  0423  04/04/20  1105   SODIUM mmol/L 138   < > 139   POTASSIUM mmol/L 3.8   < > 4.1   CHLORIDE mmol/L 103   < > 102   CO2 mmol/L 25.0   < > 26.0   BUN mg/dL 7*   < > 8   CREATININE mg/dL 0.58   < > 0.60   CALCIUM mg/dL 9.1   < > 9.2   BILIRUBIN mg/dL  --   --  0.6   ALK PHOS U/L  --   --  70   ALT (SGPT) U/L  --   --  6   AST (SGOT) U/L  --   --  14   GLUCOSE mg/dL 117*   < > 116*    < > = values in this interval not displayed.     Results from last 7 days   Lab Units 04/07/20  0423   SODIUM mmol/L 138   POTASSIUM mmol/L  3.8   CHLORIDE mmol/L 103   CO2 mmol/L 25.0   BUN mg/dL 7*   CREATININE mg/dL 0.58   GLUCOSE mg/dL 117*   CALCIUM mg/dL 9.1     Imaging Results (Last 72 Hours)     Procedure Component Value Units Date/Time    CT Chest Without Contrast [[id number]] Collected:  04/06/20 1624     Updated:  04/07/20 1159    Narrative:       EXAMINATION: CT CHEST WO CONTRAST-04/06/2020:      INDICATION: Rectal cancer staging; K62.5-Hemorrhage of anus and rectum;  D64.9-Anemia, unspecified; R53.1-Weakness.      TECHNIQUE: CT chest without intravenous contrast.     The radiation dose reduction device was turned on for each scan per the  ALARA (As Low as Reasonably Achievable) protocol.     COMPARISON: CT abdomen and pelvis dated 04/03/2020.     FINDINGS: The thyroid is mildly heterogeneous in attenuation. No bulky  mediastinal adenopathy. Central airways are patent. Esophagus in normal  course and caliber. Atherosclerotic nonaneurysmal thoracic aorta.  Cardiac size borderline enlarged without pericardial effusion. Extended  lung windows demonstrate respiratory motion artifact without focal  consolidation. Area of likely nodular scarring left upper lobe 8 mm  adjacent to the fissure along with a subcentimeter focus of nodular  density right lower lobe measuring 4 mm in the superior portion. No  pleural effusion or coalescent consolidation. Degenerative changes of  the spine without aggressive osseous lesion. No soft tissue body wall  findings of concern. The visualized portions of the upper abdomen  unremarkable status post cholecystectomy.       Impression:       Area of likely nodular scarring posteriorly within the left  upper lobe measuring 8 mm along with a subcentimeter focus 4 mm in size  in the right lower lobe superior portion of indeterminate significance  may represent postinflammatory findings or nodular scarring with  moderate degree of respiratory motion somewhat limiting evaluation.  Attention at these sites on followup to  exclude for pulmonary nodularity  and to ensure stability.     D:  04/06/2020  E:  04/06/2020           This report was finalized on 4/7/2020 11:56 AM by Dr. Gustavo Craig.           Impression: Rectal cancer  Neoplastic pain  Anxiety  Urinary retention  GOC      Plan: Talk to the patient as well as time the patient daughter over the phone.  We discussed palliative versus hospice.  Patient does not feel that she is ready for hospice at this point time.  I would recommend palliative medicine to follow at the nursing facility to help with that transition.  Did discuss this with the patient's daughter as well.        Jos Castillo DO  04/07/20  13:58              Electronically signed by Jos Castillo DO at 04/07/20 1405     Jalen Cox MD at 04/06/20 1205      Consult Orders    1. Hematology & Oncology Inpatient Consult [[id number]] ordered by Celestine Baer MD at 04/05/20 0901                REFERRING PHYSICIAN: Galilea Campos DO    PRIMARY CARE PROVIDER: Provider, No Known    REASON FOR CONSULTATION: Metastatic Rectal Cancer      HISTORY OF PRESENT ILLNESS:  80-year-old lady who presents to the hospital with rectal bleeding.  CAT scan of the abdomen revealed mucosal thickening at the rectum and significant retroperitoneal adenopathy.  Biopsies were taken and are still pending.  This is likely metastatic rectal cancer.  She is fairly frail at baseline.  She has some dementia that present.  She lives in a nursing home.  She requires a wheelchair to get around.  She has had 20 pound weight loss over the last 6 months.      Allergies   Allergen Reactions   • Hydrocodone Hives   • Oxycodone Hives   • Penicillins Hives   • Sulfa Antibiotics Hives   • Doxycycline    • Lyrica [Pregabalin]        Past Medical History:   Diagnosis Date   • Anxiety    • Arthritis    • Bipolar disorder (CMS/HCC)    • CAD (coronary artery disease)    • Carotid stenosis    • Cataracts, bilateral    • Contracture of ankle,  right    • Coronary artery disease    • CVA (cerebral vascular accident) (CMS/[facility])    • Dementia with behavioral disturbance (CMS/[facility])    • Depression    • Dysphasia    • Generalized weakness    • GERD (gastroesophageal reflux disease)    • Hx of migraines    • Hyperlipidemia    • Hypertension    • Insomnia    • Osteoarthritis    • Overactive bladder          Current Facility-Administered Medications:   •  acetaminophen (TYLENOL) tablet 500 mg, 500 mg, Oral, Q4H PRN, Celestine Baer MD, 500 mg at 04/06/20 0453  •  acetaminophen (TYLENOL) tablet 650 mg, 650 mg, Oral, Q6H PRN, Celestine Baer MD, 650 mg at 04/05/20 0159  •  ALPRAZolam (XANAX) tablet 0.25 mg, 0.25 mg, Oral, TID PRN, Celestine Baer MD, 0.25 mg at 04/06/20 0620  •  amLODIPine (NORVASC) tablet 5 mg, 5 mg, Oral, Daily, Celestine Baer MD, 5 mg at 04/06/20 0811  •  atenolol (TENORMIN) tablet 50 mg, 50 mg, Oral, Q12H, Celestine Baer MD, 50 mg at 04/06/20 0811  •  atorvastatin (LIPITOR) tablet 20 mg, 20 mg, Oral, Daily, Celestine Baer MD, 20 mg at 04/06/20 0812  •  bethanechol (URECHOLINE) tablet 10 mg, 10 mg, Oral, TID, Galilea Campos DO, 10 mg at 04/06/20 0811  •  busPIRone (BUSPAR) tablet 5 mg, 5 mg, Oral, BID, Celestine Baer MD, 5 mg at 04/06/20 0811  •  cloNIDine (CATAPRES) tablet 0.1 mg, 0.1 mg, Oral, Q12H PRN, Galilea Campos DO  •  hydrALAZINE (APRESOLINE) injection 10 mg, 10 mg, Intravenous, Q6H PRN, Celestine Baer MD  •  HYDROcodone-acetaminophen (NORCO)  MG per tablet 1 tablet, 1 tablet, Oral, Q6H PRN, Celestine Baer MD, 1 tablet at 04/06/20 0620  •  loperamide (IMODIUM) capsule 2 mg, 2 mg, Oral, Q6H PRN, Celestine Baer MD  •  melatonin tablet 2.5 mg, 2.5 mg, Oral, Nightly, Celestine Baer MD, 2.5 mg at 04/05/20 2031  •  ondansetron (ZOFRAN) injection 4 mg, 4 mg, Intravenous, Q6H PRN, Celestine Baer MD  •  ondansetron (ZOFRAN) tablet 4 mg, 4 mg, Oral, Q6H PRN, Celestine Baer MD  •  pantoprazole (PROTONIX)  injection 40 mg, 40 mg, Intravenous, BID AC, Celestine Baer MD, 40 mg at 04/06/20 0533  •  polyethylene glycol 3350 powder (packet), 17 g, Oral, Daily, Celestine Baer MD, 17 g at 04/06/20 0812  •  potassium chloride (MICRO-K) CR capsule 40 mEq, 40 mEq, Oral, PRN, 40 mEq at 04/06/20 0952 **OR** potassium chloride (KLOR-CON) packet 40 mEq, 40 mEq, Oral, PRN **OR** potassium chloride 10 mEq in 100 mL IVPB, 10 mEq, Intravenous, Q1H PRN, Galilea Campos, DO  •  rivastigmine (EXELON) 4.6 MG/24HR patch 1 patch, 1 patch, Transdermal, Daily, Celestine Baer MD, 1 patch at 04/06/20 0813  •  rOPINIRole (REQUIP) tablet 0.25 mg, 0.25 mg, Oral, Nightly, Galilea Campos, DO  •  senna (SENOKOT) tablet 1 tablet, 1 tablet, Oral, Daily, Celestine Baer MD, 1 tablet at 04/06/20 0811  •  sertraline (ZOLOFT) tablet 100 mg, 100 mg, Oral, Daily, Celestine Baer MD, 100 mg at 04/06/20 0811  •  sodium chloride 0.9 % flush 10 mL, 10 mL, Intravenous, PRN, Celestine Baer MD  •  sodium chloride 0.9 % flush 10 mL, 10 mL, Intravenous, Q12H, Celestine Baer MD, 10 mL at 04/06/20 0812  •  sodium chloride 0.9 % flush 10 mL, 10 mL, Intravenous, PRN, Celestine Baer MD  •  sodium chloride 0.9 % infusion, 50 mL/hr, Intravenous, Continuous, Celestine Baer MD, Last Rate: 50 mL/hr at 04/04/20 2100, 50 mL/hr at 04/04/20 2100  •  traZODone (DESYREL) tablet 50 mg, 50 mg, Oral, Nightly, Celestine Baer MD, 50 mg at 04/05/20 2032    Past Surgical History:   Procedure Laterality Date   • ABDOMINAL SURGERY     • CHOLECYSTECTOMY     • COLONOSCOPY N/A 4/5/2020    Procedure: COLONOSCOPY;  Surgeon: Celestine Baer MD;  Location: [facility] ENDOSCOPY;  Service: Gastroenterology;  Laterality: N/A;   • HYSTERECTOMY         Social History     Socioeconomic History   • Marital status:      Spouse name: Not on file   • Number of children: Not on file   • Years of education: Not on file   • Highest education level: Not on file   Tobacco Use   •  Smoking status: Former Smoker     Packs/day: 1.00     Years: 15.00     Pack years: 15.00     Last attempt to quit: 9/21/2016     Years since quitting: 3.5   • Smokeless tobacco: Never Used   Substance and Sexual Activity   • Alcohol use: No   • Drug use: No   • Sexual activity: Never       Family History   Problem Relation Age of Onset   • Arthritis Mother    • Early death Mother    • Heart disease Father    • Hypertension Father    • Hyperlipidemia Father    • Diabetes Father    • Hypertension Daughter         No history exists.         REVIEW OF SYSTEMS:  A 14 point review of systems was performed and is negative except as noted below.    Review of Systems   Constitutional: Positive for appetite change, fatigue and unexpected weight change.   HENT:  Negative.    Eyes: Negative.    Respiratory: Negative.    Cardiovascular: Negative.    Gastrointestinal: Positive for blood in stool.   Endocrine: Negative.    Genitourinary: Negative.     Skin: Negative.    Neurological: Positive for extremity weakness.   Hematological: Negative.    Psychiatric/Behavioral: Positive for confusion.         Objective     Vitals:    04/06/20 0100 04/06/20 0300 04/06/20 0500 04/06/20 0832   BP:    171/79   BP Location:    Left arm   Patient Position:    Lying   Pulse: 55 61 66 72   Resp:    18   Temp:    97.1 °F (36.2 °C)   TempSrc:    Axillary   SpO2: 94% 94% 94%    Weight:   56.7 kg (125 lb)    Height:           Temp:  [97.1 °F (36.2 °C)-99.4 °F (37.4 °C)] 97.1 °F (36.2 °C)     Performance Status: 3    Physical Exam    General: frail appearing female in no acute distress  HEENT: sclerae anicteric, oropharynx clear  Lymphatics: no cervical, supraclavicular, or axillary adenopathy  Cardiovascular: regular rate and rhythm, no murmurs  Lungs: clear to auscultation bilaterally  Abdomen: soft, nontender, nondistended.  No palpable organomegaly  Extremities: no lower extremity edema  Skin: no rashes, lesions, bruising, or  petechiae      LABS:    Lab Results   Component Value Date    HGB 10.6 (L) 04/06/2020    HCT 33.7 (L) 04/06/2020    MCV 89.9 04/06/2020     04/06/2020    WBC 11.75 (H) 04/06/2020    NEUTROABS 5.76 04/03/2020    LYMPHSABS 1.23 04/03/2020    MONOSABS 0.46 04/03/2020    EOSABS 0.15 04/03/2020    BASOSABS 0.05 04/03/2020     Lab Results   Component Value Date    GLUCOSE 121 (H) 04/06/2020    BUN 4 (L) 04/06/2020    CREATININE 0.56 (L) 04/06/2020     04/06/2020    K 3.4 (L) 04/06/2020     04/06/2020    CO2 24.0 04/06/2020    CALCIUM 9.4 04/06/2020    PROTEINTOT 7.4 04/04/2020    ALBUMIN 3.80 04/04/2020    BILITOT 0.6 04/04/2020    ALKPHOS 70 04/04/2020    AST 14 04/04/2020    ALT 6 04/04/2020     Lab Results   Component Value Date    CEA 3.16 04/03/2020         IMAGING    Ct Abdomen Pelvis With Contrast    Result Date: 4/3/2020  EXAMINATION: CT ABDOMEN/PELVIS W CONTRAST - 04/03/2020  INDICATION: Generalized abdominal pain.  TECHNIQUE: Multiple axial CT imaging is obtained of the abdomen and pelvis following the administration of intravenous contrast.  The radiation dose reduction device was turned on for each scan per the ALARA (As Low as Reasonably Achievable) protocol.  COMPARISON: None.  FINDINGS: Lung bases are clear. Small retrocrural lymph nodes identified adjacent the aorta at the diaphragmatic hiatus. No bulky adenopathy. Liver is homogeneous in appearance. The spleen is unremarkable. The kidneys and adrenal glands are within normal limits. 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 There is adenopathy identified throughout the retroperitoneum. The largest left periaortic lymph node measures 2 cm in size. Surgical clips seen in the right upper quadrant from prior cholecystectomy. There is incomplete distention identified of the stomach. Pancreas is homogeneous in appearance. There is moderate distention identified of the right  kidney. There is dilatation identified of the right ureter to the right UVJ where there is no evidence of a stone. The left kidney is unremarkable in appearance. Vascular calcification seen within the abdominal aorta and iliac vessels. No free fluid or free air.  PELVIS: Pelvic organs are unremarkable. The pelvic portion of the gastrointestinal tract reveals abnormal wall thickening seen of the rectum. A rectal malignancy cannot be excluded. There is no bulky pelvic adenopathy. No free fluid or free air. Mild distention of the bladder. No abnormal mass or fluid collections identified. Degenerative changes seen within the spine and pelvis. Curvature identified the spine with convexity to the right. Delayed imaging reveals contrast seen in the renal collecting systems bilaterally as well as within the ureters and bladder.      1. Abnormal wall thickening identified of the rectum and malignancy cannot be excluded. Correlation to colonoscopy is recommended for further evaluation. Adenopathy identified throughout the retroperitoneum. Findings concerning for metastatic disease. 3. Negative for pneumonia at the lung bases. 4. Moderate dilatation seen of the right renal collecting system to the UVJ where there is no evidence of obstruction or obstructing lesion. Contrast seen to the bladder.  DICTATED:   04/03/2020 EDITED/ls :   04/03/2020  This report was finalized on 4/3/2020 3:31 PM by Dr. Inés Briscoe MD.      Xr Chest 1 View    Result Date: 4/4/2020   EXAMINATION: XR CHEST 1 VW - 04/03/2020  INDICATION: GI bleed, weakness.  COMPARISON: 03/06/2017  FINDINGS: Portable chest reveals heart to be enlarged. There is increased pulmonary vascularity bilaterally. No focal parenchymal opacification is present. No pleural effusion or pneumothorax. The bony structures are unremarkable. The pulmonary vascularity is within normal limits.      Prominence of the pulmonary vascularity. Heart is enlarged. No acute parenchymal  disease.  DICTATED:   04/03/2020 EDITED/ls :   04/03/2020  This report was finalized on 4/4/2020 9:23 AM by Dr. Inés Briscoe MD.        ASSESSMENT/PLAN:    1.  Likely metastatic rectal cancer.  I will get a CAT scan of the chest to see if there is any disease present.  Treatment in this lady is going to be exceedingly difficult.  She has mild baseline dementia.  She lives in a nursing home.  Her performance status is  pretty poor.  I spoke to her and also her medical power of  who is her daughter.  Chemotherapy in this situation can easily shorten her life.  Options include single agent Xeloda versus considering palliative care with hospice at the nursing home itself.  It is unclear how much she understands her situation.  In the past she has been fairly adamant about not receiving radiation or chemotherapy to prolong her life according to her daughter.  So this may be the decision she makes.        Jalen Cox MD    4/6/2020    Electronically signed by Jalen Cox MD at 04/06/20 1212

## 2020-04-07 NOTE — PROGRESS NOTES
Continued Stay Note  [facility]     Patient Name: Loni Walker  MRN: [id number]  Today's Date: 4/7/2020    Admit Date: 4/3/2020    Discharge Plan     Row Name 04/07/20 1726       Plan    Plan  [facility] with Hospice     Plan Comments  Hospice referral received.  Call made to daughterMaribel.  Hospice plan of care and goals of care discussed.  Questions and concerns addressed.  Overview of hospice services provided.  Pt will admit to hospice with a diagnosis of rectal cancer with bleeding.  Pt will transfer to [facility] via ambulance at noon Wednesday, April 4.  Pt has no DME needs currently.  Pt daughter given 24h number and verbalized understanding to call once pt has arrived in order to be admitted to hospice.  Please, call [phone] if I can be of further assistance.          Lisandro Still RN

## 2020-04-07 NOTE — THERAPY TREATMENT NOTE
Patient Name: Loni Walker  : 1940    MRN: [id number]                              Today's Date: 2020       Admit Date: 4/3/2020    Visit Dx:     ICD-10-CM ICD-9-CM   1. Rectal bleeding K62.5 569.3   2. Anemia, unspecified type D64.9 285.9   3. Generalized weakness R53.1 780.79     Patient Active Problem List   Diagnosis   • Essential hypertension   • CVA (cerebral vascular accident) (CMS/[facility])   • Carotid stenosis   • Constipation   • Cerebrovascular accident (CVA) (CMS/[facility])   • Altered mental status, unspecified   • Hypertensive emergency   • History of urinary retention   • UTI (urinary tract infection)   • Rectal bleeding   • Mixed hyperlipidemia   • Acute blood loss anemia   • Hydronephrosis     Past Medical History:   Diagnosis Date   • Anxiety    • Arthritis    • Bipolar disorder (CMS/[facility])    • CAD (coronary artery disease)    • Carotid stenosis    • Cataracts, bilateral    • Contracture of ankle, right    • Coronary artery disease    • CVA (cerebral vascular accident) (CMS/[facility])    • Dementia with behavioral disturbance (CMS/[facility])    • Depression    • Dysphasia    • Generalized weakness    • GERD (gastroesophageal reflux disease)    • Hx of migraines    • Hyperlipidemia    • Hypertension    • Insomnia    • Osteoarthritis    • Overactive bladder      Past Surgical History:   Procedure Laterality Date   • ABDOMINAL SURGERY     • CHOLECYSTECTOMY     • COLONOSCOPY N/A 2020    Procedure: COLONOSCOPY;  Surgeon: Celestine Baer MD;  Location: [facility] ENDOSCOPY;  Service: Gastroenterology;  Laterality: N/A;   • HYSTERECTOMY       General Information     Row Name 20 1040          PT Evaluation Time/Intention    Document Type  therapy note (daily note)  -LM     Mode of Treatment  physical therapy  -     Row Name 20 1040          General Information    Patient Profile Reviewed?  yes  -LM     Existing Precautions/Restrictions  fall;other (see comments) R PF Contracture and residual RLE  weakness from previous CVA  -LM     Row Name 04/07/20 1040          Cognitive Assessment/Intervention- PT/OT    Orientation Status (Cognition)  oriented x 4  -LM     Row Name 04/07/20 1040          Safety Issues, Functional Mobility    Safety Issues Affecting Function (Mobility)  safety precaution awareness;safety precautions follow-through/compliance;sequencing abilities  -LM     Impairments Affecting Function (Mobility)  endurance/activity tolerance;strength;range of motion (ROM);balance;coordination;postural/trunk control;motor control;motor planning  -LM     Comment, Safety Issues/Impairments (Mobility)  R PF Contracture  -LM       User Key  (r) = Recorded By, (t) = Taken By, (c) = Cosigned By    Initials Name Provider Type    LM Danae Zamorano, DILAN Physical Therapist        Mobility     Row Name 04/07/20 1040          Bed Mobility Assessment/Treatment    Bed Mobility Assessment/Treatment  supine-sit;sit-supine  -LM     Supine-Sit [place] (Bed Mobility)  moderate assist (50% patient effort);1 person assist  -LM     Sit-Supine [place] (Bed Mobility)  minimum assist (75% patient effort);1 person assist  -LM     Assistive Device (Bed Mobility)  bed rails;head of bed elevated  -LM     Comment (Bed Mobility)  Vc's for sequencing.  -LM     Row Name 04/07/20 1040          Transfer Assessment/Treatment    Comment (Transfers)  Completed 2 stand pivot transfers with ModAx2.  Able to stand with MaxAx1 while 2nd person completed dependent pericare.  Pt stands on toes with transfers (has PF contracture on the R)  -LM     Row Name 04/07/20 1040          Bed-Chair Transfer    Bed-Chair [place] (Transfers)  moderate assist (50% patient effort);2 person assist  -LM     Assistive Device (Bed-Chair Transfers)  other (see comments) No AD; One person in front; One person in back  -LM     Row Name 04/07/20 1040          Sit-Stand Transfer    Sit-Stand [place] (Transfers)  maximum assist (25% patient effort);1 person  assist  -LM     Assistive Device (Sit-Stand Transfers)  other (see comments) No AD  -LM       User Key  (r) = Recorded By, (t) = Taken By, (c) = Cosigned By    Initials Name Provider Type    Danae Solorio PT Physical Therapist        Obj/Interventions     Row Name 04/07/20 1040          Therapeutic Exercise    Lower Extremity (Therapeutic Exercise)  gastroc stretch, right;heel slides, bilateral;SLR (straight leg raise), bilateral  -LM     Lower Extremity Range of Motion (Therapeutic Exercise)  ankle dorsiflexion/plantar flexion, left;hip abduction/adduction, bilateral;other (see comments) B Ankle Circles - CW, CCW  -LM     Exercise Type (Therapeutic Exercise)  AROM (active range of motion);PROM (passive range of motion) PROM for R gastroc stretch and R ankle circles only  -LM     Position (Therapeutic Exercise)  supine  -LM     Sets/Reps (Therapeutic Exercise)  x10 each  -LM     Expected Outcome (Therapeutic Exercise)  improve functional stability;improve performance, gait skills;improve performance, transfer skills  -LM       User Key  (r) = Recorded By, (t) = Taken By, (c) = Cosigned By    Initials Name Provider Type    Danae Solorio PT Physical Therapist        Goals/Plan    No documentation.       Clinical Impression     Row Name 04/07/20 1040          Pain Assessment    Additional Documentation  Pain Scale: Numbers Pre/Post-Treatment (Group)  -[facility] Name 04/07/20 1040          Pain Scale: Numbers Pre/Post-Treatment    Pain Scale: Numbers, Pretreatment  6/10  -LM     Pain Scale: Numbers, Post-Treatment  4/10  -LM     Pain Location - Orientation  generalized  -LM     Pain Location  abdomen  -LM     Pain Intervention(s)  Repositioned;Ambulation/increased activity  -LM     Row Name 04/07/20 1040          Plan of Care Review    Plan of Care Reviewed With  patient  -LM     Progress  no change  -LM     Row Name 04/07/20 1040          Vital Signs    Pretreatment Heart Rate (beats/min)  69  -LM      Posttreatment Heart Rate (beats/min)  68  -LM     Pre SpO2 (%)  95  -LM     O2 Delivery Pre Treatment  room air  -LM     Post SpO2 (%)  92  -LM     O2 Delivery Post Treatment  room air  -LM     Pre Patient Position  Supine  -LM     Intra Patient Position  Standing  -LM     Post Patient Position  Supine  -LM     Row Name 04/07/20 1040          Positioning and Restraints    Pre-Treatment Position  in bed  -LM     Post Treatment Position  bed  -LM     In Bed  supine;call light within reach;encouraged to call for assist;exit alarm on;notified nsg  -LM       User Key  (r) = Recorded By, (t) = Taken By, (c) = Cosigned By    Initials Name Provider Type    Danae Solorio, DILAN Physical Therapist        Outcome Measures     Row Name 04/07/20 1040          How much help from another person do you currently need...    Turning from your back to your side while in flat bed without using bedrails?  3  -LM     Moving from lying on back to sitting on the side of a flat bed without bedrails?  2  -LM     Moving to and from a bed to a chair (including a wheelchair)?  2  -LM     Standing up from a chair using your arms (e.g., wheelchair, bedside chair)?  2  -LM     Climbing 3-5 steps with a railing?  1  -LM     To walk in hospital room?  1  -LM     AM-PAC 6 Clicks Score (PT)  11  -LM     Row Name 04/07/20 1040          Functional Assessment    Outcome Measure Options  AM-PAC 6 Clicks Basic Mobility (PT)  -LM       User Key  (r) = Recorded By, (t) = Taken By, (c) = Cosigned By    Initials Name Provider Type    Danae Solorio PT Physical Therapist          PT Recommendation and Plan     Plan of Care Reviewed With: patient  Progress: no change  Outcome Summary: Pt progressing slowly towards skilled PT goals.  Pt transferred supine-->sit with ModAx1, completed 2 stand pivots with ModAx2, and transferred sit-->supine with MinAx1.  Pt completed BLE ther ex without complaint - requires passive stretching of the R ankle due to PF  contracture.  Continue with skilled PT to improve mobility and safety.     Time Calculation:   PT Charges     Row Name 04/07/20 1040             Time Calculation    Start Time  1040  -LM      PT Received On  04/07/20  -LM      PT Goal Re-Cert Due Date  04/14/20  -LM         Timed Charges    70377 - PT Therapeutic Exercise Minutes  8  -LM      11800 - PT Therapeutic Activity Minutes  15  -LM        User Key  (r) = Recorded By, (t) = Taken By, (c) = Cosigned By    Initials Name Provider Type     Danae Zamorano, DILAN Physical Therapist        Therapy Charges for Today     Code Description Service Date Service Provider Modifiers Qty    92024326182 HC PT THERAPEUTIC ACT EA 15 MIN 4/7/2020 Danae Zamorano, PT GP 1    37475425143 HC PT THER PROC EA 15 MIN 4/7/2020 Danae Zamorano, PT GP 1          PT G-Codes  Outcome Measure Options: AM-PAC 6 Clicks Basic Mobility (PT)  AM-PAC 6 Clicks Score (PT): 11  AM-PAC 6 Clicks Score (OT): 17    Danae Zamorano PT  4/7/2020

## 2020-04-07 NOTE — PLAN OF CARE
Problem: Patient Care Overview  Goal: Plan of Care Review  Flowsheets (Taken 4/7/2020 1042)  Outcome Summary: Pt participates in therapy with encouragement. Dependent for toileting tasks. Min A UB dressing tasks. Up to EOB with assist x1. [place] [facility] [place]. OT will continue to follow IP. Plan is SNF at d/c with palliative care to follow.

## 2020-04-07 NOTE — PROGRESS NOTES
"S: Doing reasonably well.    Past medical history, social history and family history was reviewed and unchanged from prior visit.    Review of Systems:    Review of Systems   A comprehensive 14 point review of systems was performed and was negative except as mentioned.      Medications:  The current medication list was reviewed in the EMR    ALLERGIES:    Allergies   Allergen Reactions   • Hydrocodone Hives   • Oxycodone Hives   • Penicillins Hives   • Sulfa Antibiotics Hives   • Doxycycline    • Lyrica [Pregabalin]          Physical Exam    VITAL SIGNS:  /65 (BP Location: Left arm, Patient Position: Lying)   Pulse 85   Temp 98.9 °F (37.2 °C) (Oral)   Resp 18   Ht 149.9 cm (59\")   Wt 54.3 kg (119 lb 12.8 oz)   LMP  (LMP Unknown) Comment: Post Menopausal  SpO2 94%   BMI 24.20 kg/m²   Temp:  [98.9 °F (37.2 °C)-99.2 °F (37.3 °C)] 98.9 °F (37.2 °C)      Performance Status: 3    Physical Exam    General: elderly, in no acute distress  Extremities: no lower extremity edema  Skin: no rashes, lesions, bruising, or petechiae  Msk:  Shows  weakness of the large muscle groups  Psych: Mood is stable        RECENT LABS:    Lab Results   Component Value Date    HGB 9.9 (L) 04/07/2020    HCT 32.7 (L) 04/07/2020    MCV 90.8 04/07/2020     04/07/2020    WBC 10.15 04/07/2020    NEUTROABS 5.76 04/03/2020    LYMPHSABS 1.23 04/03/2020    MONOSABS 0.46 04/03/2020    EOSABS 0.15 04/03/2020    BASOSABS 0.05 04/03/2020       Lab Results   Component Value Date    GLUCOSE 117 (H) 04/07/2020    BUN 7 (L) 04/07/2020    CREATININE 0.58 04/07/2020     04/07/2020    K 3.8 04/07/2020     04/07/2020    CO2 25.0 04/07/2020    CALCIUM 9.1 04/07/2020    PROTEINTOT 7.4 04/04/2020    ALBUMIN 3.80 04/04/2020    BILITOT 0.6 04/04/2020    ALKPHOS 70 04/04/2020    AST 14 04/04/2020    ALT 6 04/04/2020     Ct Chest Without Contrast    Result Date: 4/6/2020  EXAMINATION: CT CHEST WO CONTRAST-04/06/2020:  INDICATION: Rectal " cancer staging; K62.5-Hemorrhage of anus and rectum; D64.9-Anemia, unspecified; R53.1-Weakness.  TECHNIQUE: CT chest without intravenous contrast.  The radiation dose reduction device was turned on for each scan per the ALARA (As Low as Reasonably Achievable) protocol.  COMPARISON: CT abdomen and pelvis dated 04/03/2020.  FINDINGS: The thyroid is mildly heterogeneous in attenuation. No bulky mediastinal adenopathy. Central airways are patent. Esophagus in normal course and caliber. Atherosclerotic nonaneurysmal thoracic aorta. Cardiac size borderline enlarged without pericardial effusion. Extended lung windows demonstrate respiratory motion artifact without focal consolidation. Area of likely nodular scarring left upper lobe 8 mm adjacent to the fissure along with a subcentimeter focus of nodular density right lower lobe measuring 4 mm in the superior portion. No pleural effusion or coalescent consolidation. Degenerative changes of the spine without aggressive osseous lesion. No soft tissue body wall findings of concern. The visualized portions of the upper abdomen unremarkable status post cholecystectomy.      Area of likely nodular scarring posteriorly within the left upper lobe measuring 8 mm along with a subcentimeter focus 4 mm in size in the right lower lobe superior portion of indeterminate significance may represent postinflammatory findings or nodular scarring with moderate degree of respiratory motion somewhat limiting evaluation. Attention at these sites on followup to exclude for pulmonary nodularity and to ensure stability.  D:  04/06/2020 E:  04/06/2020        Ct Abdomen Pelvis With Contrast    Result Date: 4/3/2020  EXAMINATION: CT ABDOMEN/PELVIS W CONTRAST - 04/03/2020  INDICATION: Generalized abdominal pain.  TECHNIQUE: Multiple axial CT imaging is obtained of the abdomen and pelvis following the administration of intravenous contrast.  The radiation dose reduction device was turned on for each scan  per the ALARA (As Low as Reasonably Achievable) protocol.  COMPARISON: None.  FINDINGS: Lung bases are clear. Small retrocrural lymph nodes identified adjacent the aorta at the diaphragmatic hiatus. No bulky adenopathy. Liver is homogeneous in appearance. The spleen is unremarkable. The kidneys and adrenal glands are within normal limits. 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 There is adenopathy identified throughout the retroperitoneum. The largest left periaortic lymph node measures 2 cm in size. Surgical clips seen in the right upper quadrant from prior cholecystectomy. There is incomplete distention identified of the stomach. Pancreas is homogeneous in appearance. There is moderate distention identified of the right kidney. There is dilatation identified of the right ureter to the right UVJ where there is no evidence of a stone. The left kidney is unremarkable in appearance. Vascular calcification seen within the abdominal aorta and iliac vessels. No free fluid or free air.  PELVIS: Pelvic organs are unremarkable. The pelvic portion of the gastrointestinal tract reveals abnormal wall thickening seen of the rectum. A rectal malignancy cannot be excluded. There is no bulky pelvic adenopathy. No free fluid or free air. Mild distention of the bladder. No abnormal mass or fluid collections identified. Degenerative changes seen within the spine and pelvis. Curvature identified the spine with convexity to the right. Delayed imaging reveals contrast seen in the renal collecting systems bilaterally as well as within the ureters and bladder.      1. Abnormal wall thickening identified of the rectum and malignancy cannot be excluded. Correlation to colonoscopy is recommended for further evaluation. Adenopathy identified throughout the retroperitoneum. Findings concerning for metastatic disease. 3. Negative for pneumonia at the lung bases. 4. Moderate  dilatation seen of the right renal collecting system to the UVJ where there is no evidence of obstruction or obstructing lesion. Contrast seen to the bladder.  DICTATED:   04/03/2020 EDITED/ls :   04/03/2020  This report was finalized on 4/3/2020 3:31 PM by Dr. Inés Briscoe MD.      Xr Chest 1 View    Result Date: 4/4/2020   EXAMINATION: XR CHEST 1 VW - 04/03/2020  INDICATION: GI bleed, weakness.  COMPARISON: 03/06/2017  FINDINGS: Portable chest reveals heart to be enlarged. There is increased pulmonary vascularity bilaterally. No focal parenchymal opacification is present. No pleural effusion or pneumothorax. The bony structures are unremarkable. The pulmonary vascularity is within normal limits.      Prominence of the pulmonary vascularity. Heart is enlarged. No acute parenchymal disease.  DICTATED:   04/03/2020 EDITED/ls :   04/03/2020  This report was finalized on 4/4/2020 9:23 AM by Dr. Inés Briscoe MD.            Assessment/Plan    1.  Metastatic rectal cancer.  Pathology still pending.  With patient's poor performance status and dementia, chemotherapy would be very difficult.  I spoke to her daughter about her condition.  I spoke to the patient this morning.  I have recommended she consider a more palliative care approach with hospice care at the nursing home.      Jalen Cox MD  [facility]    4/7/2020

## 2020-04-07 NOTE — THERAPY TREATMENT NOTE
Acute Care - Occupational Therapy Treatment Note  [facility]     Patient Name: Loni Walker  : 1940  MRN: [id number]  Today's Date: 2020     Date of Referral to OT: 20  Referring Physician: GABRIELLE Lewis    Admit Date: 4/3/2020       ICD-10-CM ICD-9-CM   1. Rectal bleeding K62.5 569.3   2. Anemia, unspecified type D64.9 285.9   3. Generalized weakness R53.1 780.79   4. Impaired mobility and ADLs Z74.09 799.89     Patient Active Problem List   Diagnosis   • Essential hypertension   • CVA (cerebral vascular accident) (CMS/[facility])   • Carotid stenosis   • Constipation   • Cerebrovascular accident (CVA) (CMS/[facility])   • Altered mental status, unspecified   • Hypertensive emergency   • History of urinary retention   • UTI (urinary tract infection)   • Rectal bleeding   • Mixed hyperlipidemia   • Acute blood loss anemia   • Hydronephrosis     Past Medical History:   Diagnosis Date   • Anxiety    • Arthritis    • Bipolar disorder (CMS/[facility])    • CAD (coronary artery disease)    • Carotid stenosis    • Cataracts, bilateral    • Contracture of ankle, right    • Coronary artery disease    • CVA (cerebral vascular accident) (CMS/[facility])    • Dementia with behavioral disturbance (CMS/[facility])    • Depression    • Dysphasia    • Generalized weakness    • GERD (gastroesophageal reflux disease)    • Hx of migraines    • Hyperlipidemia    • Hypertension    • Insomnia    • Osteoarthritis    • Overactive bladder      Past Surgical History:   Procedure Laterality Date   • ABDOMINAL SURGERY     • CHOLECYSTECTOMY     • COLONOSCOPY N/A 2020    Procedure: COLONOSCOPY;  Surgeon: Celestine Baer MD;  Location: [facility];  Service: Gastroenterology;  Laterality: N/A;   • HYSTERECTOMY         Therapy Treatment    Rehabilitation Treatment Summary     Row Name 20 1045             Treatment Time/Intention    Discipline  occupational therapist  -TB      Document Type  therapy note (daily note)  -TB      Patient Effort   good  -TB      Existing Precautions/Restrictions  fall  -TB      Treatment Considerations/Comments  R PF contracture and residual R side weakness from remote CVA  -TB      Recorded by [TB] Raquel Fung OT 04/07/20 1141      Row Name 04/07/20 1045             Vital Signs    O2 Delivery Post Treatment  room air  -TB      Pre Patient Position  Supine  -TB      Intra Patient Position  Standing  -TB      Post Patient Position  Supine  -TB      Recorded by [TB] Raquel Fung OT 04/07/20 1141      Row Name 04/07/20 1045             Cognitive Assessment/Intervention- PT/OT    Affect/Mental Status (Cognitive)  anxious  -TB      Orientation Status (Cognition)  oriented x 4  -TB      Follows Commands (Cognition)  over 90% accuracy;verbal cues/prompting required  -TB      Recorded by [TB] Raquel Fung OT 04/07/20 1141      Row Name 04/07/20 1045             Safety Issues, Functional Mobility    Safety Issues Affecting Function (Mobility)  safety precaution awareness;safety precautions follow-through/compliance;sequencing abilities  -TB      Impairments Affecting Function (Mobility)  balance;endurance/activity tolerance;pain;postural/trunk control;range of motion (ROM);strength  -TB      Comment, Safety Issues/Impairments (Mobility)  R PF contracture, R side residual weakness from remote CVA  -TB      Recorded by [TB] Raquel Fung OT 04/07/20 1141      Row Name 04/07/20 1045             Bed Mobility Assessment/Treatment    Supine-Sit [place] (Bed Mobility)  moderate assist (50% patient effort);1 person assist  -TB      Sit-Supine [place] (Bed Mobility)  minimum assist (75% patient effort);1 person assist  -TB      Bed Mobility, Safety Issues  decreased use of arms for pushing/pulling;decreased use of legs for bridging/pushing  -TB      Assistive Device (Bed Mobility)  bed rails;head of bed elevated  -TB      Comment (Bed Mobility)  encouragement, cues and assist to sequence   -TB      Recorded by [TB] Raquel Fung, OT 04/07/20 1141      Row Name 04/07/20 1045             Functional Mobility    Functional Mobility- Ind. Level  unable to perform  -TB      Functional Mobility- Comment  Pt is non-ambulatory  -TB      Recorded by [TB] Raquel Fung, OT 04/07/20 1141      Row Name 04/07/20 1045             Transfer Assessment/Treatment    Transfer Assessment/Treatment  toilet transfer  -TB      Comment (Transfers)  Stand Pivot to/from BSC  -TB      Recorded by [TB] Raquel Fung, OT 04/07/20 1141      Row Name 04/07/20 1045             Sit-Stand Transfer    Sit-Stand [place] (Transfers)  maximum assist (25% patient effort);1 person assist  -TB      Assistive Device (Sit-Stand Transfers)  -- gait belt  -TB      Recorded by [TB] Raquel Fung, OT 04/07/20 1141      Row Name 04/07/20 1045             Toilet Transfer    Type (Toilet Transfer)  stand pivot/stand step  -TB      [place] Level (Toilet Transfer)  moderate assist (50% patient effort);2 person assist  -TB      Assistive Device (Toilet Transfer)  commode, bedside without drop arms gait belt  -TB      Recorded by [TB] Raquel Fung, OT 04/07/20 1141      Row Name 04/07/20 1045             ADL Assessment/Intervention    42710 - OT Self Care/Mgmt Minutes  15  -TB      BADL Assessment/Intervention  upper body dressing;toileting;bathing  -TB      Recorded by [TB] Raquel Fung, OT 04/07/20 1141      Row Name 04/07/20 1045             Bathing Assessment/Intervention    Bathing [place] Level  proximal lower extremities;perineal area;dependent (less than 25% patient effort)  -TB      Comment (Bathing)  following bowel incontinence  -TB      Recorded by [TB] Raquel Fung, OT 04/07/20 1141      Row Name 04/07/20 1045             Upper Body Dressing Assessment/Training    Upper Body Dressing [place] Level  doff;don;pajama/robe;minimum assist (75% patient  effort);verbal cues  -TB      Comment (Upper Body Dressing)  assist for lines  -TB      Recorded by [TB] Raquel Fung, OT 04/07/20 1141      Row Name 04/07/20 1045             Toileting Assessment/Training    [place] Level (Toileting)  toileting skills;adjust/manage clothing;perform perineal hygiene;dependent (less than 25% patient effort)  -TB      Toileting Position  supine;supported standing  -TB      Comment (Toileting)  completed toileting x2 episodes  -TB      Recorded by [TB] Raquel Fung, OT 04/07/20 1141      Row Name 04/07/20 1045             BADL Safety/Performance    Impairments, BADL Safety/Performance  balance;endurance/activity tolerance;pain;range of motion;strength;trunk/postural control  -TB      Recorded by [TB] Raquel Fung, OT 04/07/20 1141      Row Name 04/07/20 1045             Motor Skills Assessment/Interventions    Additional Documentation  Balance (Group)  -TB      Recorded by [TB] Raquel Fung, OT 04/07/20 1141      Row Name 04/07/20 1045             Balance    Balance  dynamic sitting balance;dynamic standing balance  -TB      Recorded by [TB] Raquel Fung, OT 04/07/20 1141      Row Name 04/07/20 1045             Static Sitting Balance    Level of [place] (Unsupported Sitting, Static Balance)  standby assist  -TB      Recorded by [TB] Raquel Fung, OT 04/07/20 1141      Row Name 04/07/20 1045             Dynamic Sitting Balance    Level of [place], Reaches Outside Midline (Sitting, Dynamic Balance)  contact guard assist  -TB      Sitting Position, Reaches Outside Midline (Sitting, Dynamic Balance)  sitting on edge of bed BSC  -TB      Comment, Reaches Outside Midline (Sitting, Dynamic Balance)  ADL tasks  -TB      Recorded by [TB] Raquel Fung, OT 04/07/20 1141      Row Name 04/07/20 1045             Dynamic Standing Balance    Level of [place], Reaches Outside Midline (Standing, Dynamic Balance)   moderate assist, 50 to 74% patient effort;2 person assist  -TB      Assistive Device Utilized (Supported Standing, Dynamic Balance)  -- gait belt  -TB      Comment, Reaches Outside Midline (Standing, Dynamic Balance)  BSC transfers  -TB      Recorded by [TB] Raquel Fung OT 04/07/20 1141      Row Name 04/07/20 1045             Positioning and Restraints    Pre-Treatment Position  in bed  -TB      Post Treatment Position  bed  -TB      In Bed  fowlers;call light within reach;encouraged to call for assist;exit alarm on;with PT  -TB      Recorded by [TB] Raquel Fung, OT 04/07/20 1141      Row Name 04/07/20 1045             Pain Scale: Numbers Pre/Post-Treatment    Pain Scale: Numbers, Pretreatment  6/10  -TB      Pain Scale: Numbers, Post-Treatment  4/10  -TB      Pain Location  abdomen  -TB      Pre/Post Treatment Pain Comment  pt tolerated brief OOB activity  -TB      Pain Intervention(s)  Ambulation/increased activity;Repositioned  -TB      Recorded by [TB] Raquel Fung, OT 04/07/20 1141      Row Name                Wound 04/03/20 1850 medial coccyx Pressure Injury    Wound - Properties Group Date first assessed: 04/03/20 [MC] Time first assessed: 1850 [MC] Present on Hospital Admission: Y [MC] Orientation: medial [MC] Location: coccyx [MC] Primary Wound Type: Pressure inj [MC] Stage, Pressure Injury: deep tissue injury [MC] Recorded by:  [MC] Nidia Gonzales RN 04/03/20 1938    Row Name 04/07/20 1045             Coping    Observed Emotional State  accepting;cooperative  -TB      Verbalized Emotional State  acceptance  -TB      Recorded by [TB] Raquel Fung OT 04/07/20 1141      Row Name 04/07/20 1045             Plan of Care Review    Plan of Care Reviewed With  patient  -TB      Recorded by [TB] Raquel Fung OT 04/07/20 1141      Row Name 04/07/20 1045             Outcome Summary/Treatment Plan (OT)    Daily Summary of Progress (OT)  progress toward functional  goals as expected  -TB      Barriers to Overall Progress (OT)  medically complex  -TB      Plan for Continued Treatment (OT)  per POC  -TB      Anticipated Discharge Disposition (OT)  skilled nursing facility  -TB      Recorded by [TB] Raquel Fung OT 04/07/20 1141        User Key  (r) = Recorded By, (t) = Taken By, (c) = Cosigned By    Initials Name Effective Dates Discipline    TB Raquel uFng OT 06/08/18 -  OT    Nidia Mullins RN 08/22/16 -  Nurse        Wound 04/03/20 1850 medial coccyx Pressure Injury (Active)   Dressing Appearance open to air 4/7/2020  8:41 AM   Closure None 4/7/2020  8:41 AM   Base blanchable 4/7/2020  8:41 AM   Periwound dry;blanchable 4/7/2020  8:41 AM   Periwound Temperature warm 4/7/2020  8:41 AM   Drainage Amount none 4/7/2020  8:41 AM   Care, Wound barrier applied 4/6/2020  8:00 PM   Dressing Care, Wound open to air 4/6/2020  8:00 PM   Periwound Care, Wound dry periwound area maintained 4/6/2020  8:00 PM           OT Recommendation and Plan  Outcome Summary/Treatment Plan (OT)  Daily Summary of Progress (OT): progress toward functional goals as expected  Barriers to Overall Progress (OT): medically complex  Plan for Continued Treatment (OT): per POC  Anticipated Discharge Disposition (OT): skilled nursing facility  Daily Summary of Progress (OT): progress toward functional goals as expected  Plan of Care Review  Plan of Care Reviewed With: patient  Plan of Care Reviewed With: patient  Outcome Summary: Pt participates in therapy with encouragement. Dependent for toileting tasks. Min A UB dressing tasks. Up to EOB with assist x1. [place] to[place] [place] x2. OT will continue to follow IP. Plan is SNF at d/c with palliative care to follow.  Outcome Measures     Row Name 04/07/20 1045             How much help from another is currently needed...    Putting on and taking off regular lower body clothing?  2  -TB      Bathing (including washing, rinsing, and  drying)  2  -TB      Toileting (which includes using toilet bed pan or urinal)  2  -TB      Putting on and taking off regular upper body clothing  2  -TB      Taking care of personal grooming (such as brushing teeth)  3  -TB      Eating meals  4  -TB      AM-PAC 6 Clicks Score (OT)  15  -TB         Functional Assessment    Outcome Measure Options  AM-PAC 6 Clicks Daily Activity (OT)  -TB        User Key  (r) = Recorded By, (t) = Taken By, (c) = Cosigned By    Initials Name Provider Type    Raquel Hernandez OT Occupational Therapist           Time Calculation:   Time Calculation- OT     Row Name 04/07/20 1144 04/07/20 1045          Time Calculation- OT    OT Start Time  1045  -TB  --     OT Received On  04/07/20  -TB  --     OT Goal Re-Cert Due Date  04/14/20  -TB  --        Timed Charges    78311 - OT Self Care/Mgmt Minutes  --  15  -TB       User Key  (r) = Recorded By, (t) = Taken By, (c) = Cosigned By    Initials Name Provider Type    Raquel Hernandez OT Occupational Therapist        Therapy Charges for Today     Code Description Service Date Service Provider Modifiers Qty    [id number] HC OT SELF CARE/MGMT/TRAIN EA 15 MIN 4/7/2020 Raquel Fung OT GO 1               Raquel Fung OT  4/7/2020

## 2020-04-08 VITALS
BODY MASS INDEX: 24.11 KG/M2 | OXYGEN SATURATION: 94 % | HEART RATE: 79 BPM | RESPIRATION RATE: 18 BRPM | HEIGHT: 59 IN | DIASTOLIC BLOOD PRESSURE: 89 MMHG | TEMPERATURE: 97.7 F | WEIGHT: 119.6 LBS | SYSTOLIC BLOOD PRESSURE: 147 MMHG

## 2020-04-08 PROBLEM — C20 RECTAL CANCER METASTASIZED TO INTRA-ABDOMINAL LYMPH NODE (HCC): Status: ACTIVE | Noted: 2020-04-08

## 2020-04-08 PROBLEM — C77.2 RECTAL CANCER METASTASIZED TO INTRA-ABDOMINAL LYMPH NODE (HCC): Status: ACTIVE | Noted: 2020-04-08

## 2020-04-08 PROBLEM — D62 ACUTE BLOOD LOSS ANEMIA: Status: RESOLVED | Noted: 2020-04-03 | Resolved: 2020-04-08

## 2020-04-08 LAB
ANION GAP SERPL CALCULATED.3IONS-SCNC: 12 MMOL/L (ref 5–15)
BUN BLD-MCNC: 8 MG/DL (ref 8–23)
BUN/CREAT SERPL: 12.5 (ref 7–25)
CALCIUM SPEC-SCNC: 9.7 MG/DL (ref 8.6–10.5)
CHLORIDE SERPL-SCNC: 97 MMOL/L (ref 98–107)
CO2 SERPL-SCNC: 26 MMOL/L (ref 22–29)
CREAT BLD-MCNC: 0.64 MG/DL (ref 0.57–1)
DEPRECATED RDW RBC AUTO: 48 FL (ref 37–54)
ERYTHROCYTE [DISTWIDTH] IN BLOOD BY AUTOMATED COUNT: 15 % (ref 12.3–15.4)
GFR SERPL CREATININE-BSD FRML MDRD: 89 ML/MIN/1.73
GLUCOSE BLD-MCNC: 110 MG/DL (ref 65–99)
HCT VFR BLD AUTO: 34.3 % (ref 34–46.6)
HGB BLD-MCNC: 10.5 G/DL (ref 12–15.9)
MCH RBC QN AUTO: 27.7 PG (ref 26.6–33)
MCHC RBC AUTO-ENTMCNC: 30.6 G/DL (ref 31.5–35.7)
MCV RBC AUTO: 90.5 FL (ref 79–97)
PLATELET # BLD AUTO: 261 10*3/MM3 (ref 140–450)
PMV BLD AUTO: 10.6 FL (ref 6–12)
POTASSIUM BLD-SCNC: 4.1 MMOL/L (ref 3.5–5.2)
RBC # BLD AUTO: 3.79 10*6/MM3 (ref 3.77–5.28)
SODIUM BLD-SCNC: 135 MMOL/L (ref 136–145)
WBC NRBC COR # BLD: 9.24 10*3/MM3 (ref 3.4–10.8)

## 2020-04-08 PROCEDURE — 80048 BASIC METABOLIC PNL TOTAL CA: CPT | Performed by: FAMILY MEDICINE

## 2020-04-08 PROCEDURE — 85027 COMPLETE CBC AUTOMATED: CPT | Performed by: FAMILY MEDICINE

## 2020-04-08 PROCEDURE — 99239 HOSP IP/OBS DSCHRG MGMT >30: CPT | Performed by: PHYSICIAN ASSISTANT

## 2020-04-08 RX ORDER — LOPERAMIDE HYDROCHLORIDE 2 MG/1
2 CAPSULE ORAL EVERY 6 HOURS PRN
Qty: 45 CAPSULE | Refills: 0 | Status: SHIPPED | OUTPATIENT
Start: 2020-04-08

## 2020-04-08 RX ORDER — ROPINIROLE 0.25 MG/1
0.25 TABLET, FILM COATED ORAL NIGHTLY
Qty: 30 TABLET | Refills: 0 | Status: SHIPPED | OUTPATIENT
Start: 2020-04-08 | End: 2020-05-08

## 2020-04-08 RX ORDER — ALPRAZOLAM 0.25 MG/1
0.25 TABLET ORAL 3 TIMES DAILY PRN
Qty: 9 TABLET | Refills: 0 | Status: SHIPPED | OUTPATIENT
Start: 2020-04-08 | End: 2020-04-11

## 2020-04-08 RX ORDER — OMEPRAZOLE 40 MG/1
40 CAPSULE, DELAYED RELEASE ORAL DAILY
Qty: 30 CAPSULE | Refills: 0 | Status: SHIPPED | OUTPATIENT
Start: 2020-04-08 | End: 2020-05-08

## 2020-04-08 RX ORDER — HYDROCODONE BITARTRATE AND ACETAMINOPHEN 10; 325 MG/1; MG/1
1 TABLET ORAL EVERY 6 HOURS PRN
Qty: 12 TABLET | Refills: 0 | Status: SHIPPED | OUTPATIENT
Start: 2020-04-08 | End: 2020-04-11

## 2020-04-08 RX ADMIN — HYDROCODONE BITARTRATE AND ACETAMINOPHEN 1 TABLET: 10; 325 TABLET ORAL at 05:21

## 2020-04-08 RX ADMIN — CLOPIDOGREL BISULFATE 75 MG: 75 TABLET ORAL at 08:29

## 2020-04-08 RX ADMIN — SERTRALINE HYDROCHLORIDE 100 MG: 100 TABLET ORAL at 08:28

## 2020-04-08 RX ADMIN — SENNOSIDES 1 TABLET: 8.6 TABLET, FILM COATED ORAL at 08:33

## 2020-04-08 RX ADMIN — ALPRAZOLAM 0.25 MG: 0.25 TABLET ORAL at 08:29

## 2020-04-08 RX ADMIN — BETHANECHOL CHLORIDE 10 MG: 10 TABLET ORAL at 08:29

## 2020-04-08 RX ADMIN — RIVASTIGMINE TRANSDERMAL SYSTEM 1 PATCH: 4.6 PATCH, EXTENDED RELEASE TRANSDERMAL at 08:32

## 2020-04-08 RX ADMIN — ATORVASTATIN CALCIUM 20 MG: 20 TABLET, FILM COATED ORAL at 08:29

## 2020-04-08 RX ADMIN — AMLODIPINE BESYLATE 5 MG: 5 TABLET ORAL at 08:29

## 2020-04-08 RX ADMIN — BUSPIRONE HYDROCHLORIDE 5 MG: 5 TABLET ORAL at 08:29

## 2020-04-08 RX ADMIN — ATENOLOL 50 MG: 50 TABLET ORAL at 08:29

## 2020-04-08 NOTE — PROGRESS NOTES
Case Management Discharge Note      Final Note: Spoke with Alexa with [facility] and Vanda with Hospice, patient is to return to [facility] to be admitted to [facility]osp[facility]. [facility] ambulance scheduled for noon today.  PCS in [place].  Please call report to [phone], [facility] has EPIC access no need to fax dc info.           Destination      No service has been selected for the patient.      Durable Medical Equipment      No service has been selected for the patient.      Dialysis/Infusion      No service has been selected for the patient.      Home Medical Care      No service has been selected for the patient.      Therapy      No service has been selected for the patient.      Community Resources      No service has been selected for the patient.             Final Discharge Disposition Code: 04 - intermediate care facility

## 2020-04-08 NOTE — DISCHARGE SUMMARY
[facility] Medicine Services  DISCHARGE SUMMARY    Patient Name: Loni Walker  : 1940  MRN: [id number]    Date of Admission: 4/3/2020 12:28 PM  Date of Discharge:  2020  Primary Care Physician: Provider, No Known    Consults     Date and Time Order Name Status Description    2020 0832 Inpatient Radiation Oncology Consult      2020 0943 Inpatient Palliative Care MD Consult Completed     2020 0030 Hematology & Oncology Inpatient Consult Completed     4/3/2020 1709 Inpatient Gastroenterology Consult Completed           Hospital Course     Presenting Problem:   Rectal bleeding [K62.5]  Rectal bleeding [K62.5]    Active Hospital Problems    Diagnosis  POA   • Rectal cancer metastasized to intra-abdominal lymph node (CMS/HCC) [C20, C77.2]  Yes   • Rectal bleeding [K62.5]  Yes   • Mixed hyperlipidemia [E78.2]  Yes   • Hydronephrosis [N13.30]  Yes   • Essential hypertension [I10]  Yes      Resolved Hospital Problems    Diagnosis Date Resolved POA   • **Acute blood loss anemia [D62] 2020 Yes          Hospital Course:  Loni Walker is a 80 y.o. female with CVA, HTN, migraines, osteoarthritis, CAD, HLD, carotid stenosis, urinary retention who was admitted on 4/3/2020 due to rectal bleeding and abdominal pain.  On admission her hemoglobin and hematocrit were found to be 7.8 and 25.8.  Her CT abdomen pelvis with contrast revealed abnormal wall thickening of the rectum.  Adenopathy throughout the retroperitoneum both findings concerning for malignancy with metastasis.  There is also moderate dilation of the right renal collecting system to the UVJ.  No evidence of obstructing stone.  She also underwent CT chest which showed area of nodular scarring posterior to the left upper lobe measuring 8 mm and a 4 mm focus on the right lower lobe superior portion.  The patient received 2 units of packed red blood cells with an appropriate response of H&H after transfusion.  Her iron  "level was low at 17 and she received 3 doses of IV iron.  Prior to admission the patient was on Plavix and this was held due to anemia and bleeding.  The patient was started on Protonix 40 mg twice daily and gastroenterology was consulted to evaluate her.  She underwent colonoscopy on 4/5/2020 which revealed small mass in the rectum posteriorly starting at the anal verge bleeding site biopsied.  Very few sigmoid diverticuli.  Her rectal mass pathology revealed infiltrating squamous carcinoma.  She was evaluated by oncology  who felt that her rectal cancer was metastasized and did not feel she was a candidate for chemotherapy.  He is recommending palliative approach.  The patient was apprehensive to \"hospice.\"  However, she agreed to have palliative care evaluate her.  Palliative care evaluated the patient on 4/7/2020 and recommending them following her care at [facility].  Daughter is requesting Hospice evaluation at nursing facility and stating we should avoid using the word \"hospice.\"        Discharge Follow Up Recommendations for outpatient labs/diagnostics:  Follow up with Palliative Care.  Hospice to follow at home.      Day of Discharge     HPI:   Ms. Walker reports mild abdominal pain since yesterday.  She reports no bowel movement in 3 days.  She denies nausea, vomiting, cough, shortness of breath, fever or chills.      Review of Systems  Gen- No fevers, chills  CV- No chest pain, palpitations  Resp- No cough, dyspnea  GI- positive for mild abdominal pain; negative for nausea/vomiting      Vital Signs:   Temp:  [97.7 °F (36.5 °C)] 97.7 °F (36.5 °C)  Heart Rate:  [79] 79  Resp:  [18] 18  BP: (147-174)/(89-91) 147/89     Physical Exam:  Constitutional: No acute distress, awake, alert  HENT: NCAT, mucous membranes moist  Respiratory: Clear to auscultation bilaterally, respiratory effort normal   Cardiovascular: RRR, no murmurs, rubs, or gallops, palpable pedal pulses " bilaterally  Gastrointestinal: Positive bowel sounds, soft, nontender, nondistended  Musculoskeletal: No bilateral ankle edema  Psychiatric: Appropriate affect, cooperative  Neurologic: Oriented x 3, Cranial Nerves grossly intact to confrontation, speech clear  Skin: No rashes      Pertinent  and/or Most Recent Results     Results from last 7 days   Lab Units 04/08/20  0357 04/07/20  0423 04/06/20  1743 04/06/20  0434 04/05/20  0742 04/04/20  2341 04/04/20  1830 04/04/20  1105  04/03/20  1304 04/03/20  1303   WBC 10*3/mm3 9.24 10.15  --  11.75*  --   --   --  9.97  --  7.67  --    HEMOGLOBIN g/dL 10.5* 9.9*  --  10.6* 11.0* 10.4* 10.2* 10.1*   < > 7.8*  --    HEMATOCRIT % 34.3 32.7*  --  33.7* 34.3 33.5* 32.2* 31.8*   < > 25.8*  --    PLATELETS 10*3/mm3 261 263  --  291  --   --   --  279  --  284  --    SODIUM mmol/L 135* 138  --  139 138  --   --  139  --   --  137   POTASSIUM mmol/L 4.1 3.8 4.3 3.4* 3.4*  --   --  4.1  --   --  4.2   CHLORIDE mmol/L 97* 103  --  102 102  --   --  102  --   --  100   CO2 mmol/L 26.0 25.0  --  24.0 22.0  --   --  26.0  --   --  27.0   BUN mg/dL 8 7*  --  4* 3*  --   --  8  --   --  12   CREATININE mg/dL 0.64 0.58  --  0.56* 0.50*  --   --  0.60  --   --  0.82   GLUCOSE mg/dL 110* 117*  --  121* 115*  --   --  116*  --   --  127*   CALCIUM mg/dL 9.7 9.1  --  9.4 9.2  --   --  9.2  --   --  9.2    < > = values in this interval not displayed.     Results from last 7 days   Lab Units 04/04/20  1105 04/03/20  1304 04/03/20  1303   BILIRUBIN mg/dL 0.6  --  0.2   ALK PHOS U/L 70  --  69   ALT (SGPT) U/L 6  --  7   AST (SGOT) U/L 14  --  13   PROTIME Seconds  --  13.6  --    INR   --  1.07  --            Invalid input(s): TG, LDLCALC, LDLREALC  Results from last 7 days   Lab Units 04/03/20  1304 04/03/20  1303   HEMOGLOBIN A1C % 5.40  --    PROBNP pg/mL  --  486.4   TROPONIN T ng/mL  --  <0.010       Brief Urine Lab Results  (Last result in the past 365 days)      Color   Clarity   Blood    Leuk Est   Nitrite   Protein   CREAT   Urine HCG        04/03/20 1342 Yellow Clear Negative Negative Negative Negative               Microbiology Results Abnormal     None          Imaging Results (All)     Procedure Component Value Units Date/Time    CT Chest Without Contrast [[id number]] Collected:  04/06/20 1624     Updated:  04/07/20 1159    Narrative:       EXAMINATION: CT CHEST WO CONTRAST-04/06/2020:      INDICATION: Rectal cancer staging; K62.5-Hemorrhage of anus and rectum;  D64.9-Anemia, unspecified; R53.1-Weakness.      TECHNIQUE: CT chest without intravenous contrast.     The radiation dose reduction device was turned on for each scan per the  ALARA (As Low as Reasonably Achievable) protocol.     COMPARISON: CT abdomen and pelvis dated 04/03/2020.     FINDINGS: The thyroid is mildly heterogeneous in attenuation. No bulky  mediastinal adenopathy. Central airways are patent. Esophagus in normal  course and caliber. Atherosclerotic nonaneurysmal thoracic aorta.  Cardiac size borderline enlarged without pericardial effusion. Extended  lung windows demonstrate respiratory motion artifact without focal  consolidation. Area of likely nodular scarring left upper lobe 8 mm  adjacent to the fissure along with a subcentimeter focus of nodular  density right lower lobe measuring 4 mm in the superior portion. No  pleural effusion or coalescent consolidation. Degenerative changes of  the spine without aggressive osseous lesion. No soft tissue body wall  findings of concern. The visualized portions of the upper abdomen  unremarkable status post cholecystectomy.       Impression:       Area of likely nodular scarring posteriorly within the left  upper lobe measuring 8 mm along with a subcentimeter focus 4 mm in size  in the right lower lobe superior portion of indeterminate significance  may represent postinflammatory findings or nodular scarring with  moderate degree of respiratory motion somewhat limiting  evaluation.  Attention at these sites on followup to exclude for pulmonary nodularity  and to ensure stability.     D:  04/06/2020  E:  04/06/2020           This report was finalized on 4/7/2020 11:56 AM by Dr. Gustavo Craig.       XR Chest 1 View [[id number]] Collected:  04/03/20 1501     Updated:  04/04/20 0926    Narrative:          EXAMINATION: XR CHEST 1 VW - 04/03/2020     INDICATION: GI bleed, weakness.     COMPARISON: 03/06/2017     FINDINGS: Portable chest reveals heart to be enlarged. There is  increased pulmonary vascularity bilaterally. No focal parenchymal  opacification is present. No pleural effusion or pneumothorax. The bony  structures are unremarkable. The pulmonary vascularity is within normal  limits.       Impression:       Prominence of the pulmonary vascularity. Heart is enlarged.  No acute parenchymal disease.     DICTATED:   04/03/2020  EDITED/ls :   04/03/2020      This report was finalized on 4/4/2020 9:23 AM by Dr. Inés Briscoe MD.       CT Abdomen Pelvis With Contrast [[id number]] Collected:  04/03/20 1431     Updated:  04/03/20 1534    Narrative:       EXAMINATION: CT ABDOMEN/PELVIS W CONTRAST - 04/03/2020      INDICATION: Generalized abdominal pain.     TECHNIQUE: Multiple axial CT imaging is obtained of the abdomen and  pelvis following the administration of intravenous contrast.      The radiation dose reduction device was turned on for each scan per the  ALARA (As Low as Reasonably Achievable) protocol.     COMPARISON: None.     FINDINGS: Lung bases are clear. Small retrocrural lymph nodes identified  adjacent the aorta at the diaphragmatic hiatus. No bulky adenopathy.  Liver is homogeneous in appearance. The spleen is unremarkable. The  kidneys and adrenal glands are within normal limits. 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 There is adenopathy identified  throughout the retroperitoneum. The largest  left periaortic lymph node  measures 2 cm in size. Surgical clips seen in the right upper quadrant  from prior cholecystectomy. There is incomplete distention identified of  the stomach. Pancreas is homogeneous in appearance. There is moderate  distention identified of the right kidney. There is dilatation  identified of the right ureter to the right UVJ where there is no  evidence of a stone. The left kidney is unremarkable in appearance.  Vascular calcification seen within the abdominal aorta and iliac  vessels. No free fluid or free air.     PELVIS: Pelvic organs are unremarkable. The pelvic portion of the  gastrointestinal tract reveals abnormal wall thickening seen of the  rectum. A rectal malignancy cannot be excluded. There is no bulky pelvic  adenopathy. No free fluid or free air. Mild distention of the bladder.  No abnormal mass or fluid collections identified. Degenerative changes  seen within the spine and pelvis. Curvature identified the spine with  convexity to the right. Delayed imaging reveals contrast seen in the  renal collecting systems bilaterally as well as within the ureters and  bladder.       Impression:       1. Abnormal wall thickening identified of the rectum and malignancy  cannot be excluded. Correlation to colonoscopy is recommended for  further evaluation. Adenopathy identified throughout the  retroperitoneum. Findings concerning for metastatic disease.  3. Negative for pneumonia at the lung bases.  4. Moderate dilatation seen of the right renal collecting system to the  UVJ where there is no evidence of obstruction or obstructing lesion.  Contrast seen to the bladder.     DICTATED:   04/03/2020  EDITED/ls :   04/03/2020      This report was finalized on 4/3/2020 3:31 PM by Dr. Inés Briscoe MD.                            Plan for Follow-up of Pending Labs/Results:     Discharge Details        Discharge Medications      New Medications      Instructions Start Date   rOPINIRole  0.25 MG tablet  Commonly known as:  REQUIP   0.25 mg, Oral, Nightly, Take 1 hour before bedtime.         Changes to Medications      Instructions Start Date   ALPRAZolam 0.5 MG tablet  Commonly known as:  XANAX  What changed:  when to take this   0.25 mg, Oral, 2 Times Daily PRN      ferrous sulfate 325 (65 FE) MG tablet  What changed:  Another medication with the same name was removed. Continue taking this medication, and follow the directions you see here.   325 mg, Oral, 2 Times Daily      omeprazole 40 MG capsule  Commonly known as:  PrilOSEC  What changed:  when to take this   40 mg, Oral, Daily         Continue These Medications      Instructions Start Date   acetaminophen 500 MG tablet  Commonly known as:  TYLENOL   500 mg, Oral, Every 4 Hours PRN      amLODIPine 5 MG tablet  Commonly known as:  NORVASC   5 mg, Oral, Daily      atorvastatin 20 MG tablet  Commonly known as:  LIPITOR   20 mg, Oral, Daily      busPIRone 5 MG tablet  Commonly known as:  BUSPAR   5 mg, Oral, 2 Times Daily      cloNIDine 0.1 MG tablet  Commonly known as:  CATAPRES   0.1 mg, Oral, 2 Times Daily, As needed of SBP> 180       clopidogrel 75 MG tablet  Commonly known as:  PLAVIX   75 mg, Oral, Daily      divalproex 250 MG 24 hr tablet  Commonly known as:  DEPAKOTE   125 mg, Oral, 2 Times Daily      HYDROcodone-acetaminophen  MG per tablet  Commonly known as:  NORCO   1 tablet, Oral, Every 6 Hours PRN      loperamide 2 MG capsule  Commonly known as:  IMODIUM   2 mg, Oral, Every 6 Hours PRN      melatonin 3 MG tablet   3 mg, Oral, Nightly      MULTIVITAMIN PO   1 tablet, Oral, Daily      ondansetron 4 MG tablet  Commonly known as:  ZOFRAN   4 mg, Oral, Every 6 Hours PRN      oxybutynin 5 MG tablet  Commonly known as:  DITROPAN   5 mg, Oral, 3 Times Daily      polyethylene glycol packet  Commonly known as:  MIRALAX   17 g, Oral, Daily      rivastigmine 4.6 MG/24HR patch  Commonly known as:  EXELON   1 patch, Transdermal, Daily       senna 8.6 MG tablet  Commonly known as:  SENOKOT   1 tablet, Oral, Daily      sertraline 100 MG tablet  Commonly known as:  ZOLOFT   100 mg, Oral, Daily      traZODone 50 MG tablet  Commonly known as:  DESYREL   50 mg, Oral, Nightly      vitamin C 250 MG tablet  Commonly known as:  ASCORBIC ACID   250 mg, Oral, Daily      vitamin D3 125 MCG (5000 UT) capsule capsule   2,000 Units, Oral, Daily         Stop These Medications    dilTIAZem 120 MG tablet  Commonly known as:  CARDIZEM     dilTIAZem  MG 24 hr capsule  Commonly known as:  CARDIZEM CD     dimethicone 1.3 % lotion lotion  Commonly known as:  AVEENO     doxazosin 2 MG tablet  Commonly known as:  CARDURA     hydrocortisone 2.5 % cream     magnesium oxide 400 (241.3 Mg) MG tablet tablet  Commonly known as:  MAGOX            Allergies   Allergen Reactions   • Hydrocodone Hives   • Oxycodone Hives   • Penicillins Hives   • Sulfa Antibiotics Hives   • Doxycycline    • Lyrica [Pregabalin]          Discharge Disposition: home with hospice to [facility]    Diet:  Hospital:  Diet Order   Procedures   • Diet Regular       Activity: as tolerated  Activity Instructions     Activity as Tolerated            Restrictions or Other Recommendations:  none       CODE STATUS:    Code Status and Medical Interventions:   Ordered at: 04/03/20 1920     Limited Support to NOT Include:    Antiarrhythmic Drugs    Cardioversion/Defibrillation    Intubation     Level Of Support Discussed With:    Patient     Code Status:    No CPR     Medical Interventions (Level of Support Prior to Arrest):    Limited       Future Appointments   Date Time Provider Department Center   4/8/2020  3:00 PM  [facility]  [facility]   4/8/2020  4:15 PM  [facility]  [facility]       Additional Instructions for the Follow-ups that You Need to Schedule     Call MD With Problems / Concerns   As directed      Discharge Follow-up with Specified Provider: Follow up with  Palliative Care.   As directed      To:  Follow up with Palliative Care.               Time Spent on Discharge:  60 minutes    Electronically signed by Nicki Hernandez PA-C, 04/08/20, 9:01 AM.

## 2020-04-08 NOTE — PLAN OF CARE
Problem: Patient Care Overview  Goal: Plan of Care Review  Outcome: Ongoing (interventions implemented as appropriate)  Flowsheets  Taken 4/7/2020 1040 by Danae Zamorano PT  Progress: no change  Taken 4/7/2020 2000 by Ca Zazueta, RN  Plan of Care Reviewed With: patient  Taken 4/8/2020 0435 by Ca Zazueta, RN  Outcome Summary: Patient rested throughout shift. Patient voided several times with minimal post void residual shown on bladder scan. No indication to anchor coles catheter yet.VSS. Will continue to monitor.

## 2020-04-08 NOTE — PROGRESS NOTES
Continued Stay Note  [facility]     Patient Name: Loni Walker  MRN: [id number]  Today's Date: 4/8/2020    Admit Date: 4/3/2020    Discharge Plan     Row Name 04/08/20 1112       Plan    Plan  [place] with Hospice    Plan Comments  Will place prescriptions in chart.  Follow up call made to pt daughter, Maribel, who stated she has no further questions at this time.  Plan for pt to dc to [place] today with hospice via ambulance at 12:00p.  Please, call [phone] if I can be of further assistance.        Lisandro Still RN

## 2020-04-08 NOTE — CONSULTS
CONSULTATION NOTE    NAME:      Loni Walker  :                                                          1940  DATE OF CONSULTATION:                       2020   REQUESTING PHYSICIAN:                   Kyle Hawley MD  REASON FOR CONSULTATION:           Rectal Mass  1. Rectal bleeding    2. Anemia, unspecified type    3. Generalized weakness    4. Impaired mobility and ADLs       Thank you for requesting the services of radiation oncology.  We have been asked to see Mrs. Walker for consideration of palliative radiotherapy to a symptomatic rectal mass.       BRIEF HISTORY:  Loni Walker is a very pleasant 80 y.o. female  who was admitted from the [facility] for complaints of abdominal pain and intermittent episodes of both bright red bloody stool and melena x 2 weeks.  Additionally, she endorses anorexia and a 20 pound weight loss over the past 6 months.  CT abdomen 4/3/2020 revealed mucosal thickening at the rectum and significant retroperitoneal adenopathy concerning for metastatic disease.  She underwent colonoscopy with biopsy per Dr. Baer, which revealed a small mass in the rectum posteriorly starting at the anal verge.  Pathology positive for squamous cell carcinoma.  She received 2 units PRBCs and 3 doses of IV iron.  CT chest showed area of nodular scarring posterior to the left upper lobe measuring 8 mm, and a 4 mm focus of the right lower lobe of lung.  She was seen by Dr. Cox for her metastatic rectal cancer, and he did not feel that she would not be a good candidate for systemic treatment given her poor performance status, baseline dementia, and wishes to not undergo life prolonging treatments.  She is a DNR.  She is scheduled to return to the [facility] via ambulance later this afternoon with Hospice care.  Mrs. Walker's RN today states that she has not had any bloody stools since she was admitted.  The patient denies abdominal or rectal pain, nausea, or vomiting.   Given the visitor restrictions in light of the coronavirus, the patient's daughter/POA was phoned to participate in this conversation.  We have been asked to see this patient regarding consideration of palliative radiotherapy to the rectal mass.        Allergies   Allergen Reactions   • Hydrocodone Hives   • Oxycodone Hives   • Penicillins Hives   • Sulfa Antibiotics Hives   • Doxycycline    • Lyrica [Pregabalin]        Social History     Tobacco Use   • Smoking status: Former Smoker     Packs/day: 1.00     Years: 15.00     Pack years: 15.00     Last attempt to quit: 9/21/2016     Years since quitting: 3.5   • Smokeless tobacco: Never Used   Substance Use Topics   • Alcohol use: No   • Drug use: No       Past Medical History:   Diagnosis Date   • Anxiety    • Arthritis    • Bipolar disorder (CMS/HCC)    • CAD (coronary artery disease)    • Carotid stenosis    • Cataracts, bilateral    • Contracture of ankle, right    • Coronary artery disease    • CVA (cerebral vascular accident) (CMS/HCC)    • Dementia with behavioral disturbance (CMS/HCC)    • Depression    • Dysphasia    • Generalized weakness    • GERD (gastroesophageal reflux disease)    • Hx of migraines    • Hyperlipidemia    • Hypertension    • Insomnia    • Osteoarthritis    • Overactive bladder        family history includes Arthritis in her mother; Diabetes in her father; Early death in her mother; Heart disease in her father; Hyperlipidemia in her father; Hypertension in her daughter and father.     Past Surgical History:   Procedure Laterality Date   • ABDOMINAL SURGERY     • CHOLECYSTECTOMY     • COLONOSCOPY N/A 4/5/2020    Procedure: COLONOSCOPY;  Surgeon: Celestine Baer MD;  [facility] ENDOSCOPY;  Service: Gastroenterology;  Laterality: N/A;   • HYSTERECTOMY          Review of Systems   Constitutional: Positive for appetite change, fatigue and unexpected weight change.   Gastrointestinal: Positive for blood in stool.   Genitourinary: Positive  for difficulty urinating (urinary retention requiring I&O catheter as well as external Purewick catheter).    Musculoskeletal: Positive for back pain and gait problem.   Neurological: Positive for extremity weakness and gait problem.   Hematological: Bruises/bleeds easily.   All other systems reviewed and are negative.         Objective   VITAL SIGNS:   Vitals:    04/07/20 0722 04/07/20 1900 04/08/20 0500 04/08/20 0828   BP: 148/65 174/91  147/89   BP Location: Left arm Right arm     Patient Position: Lying Lying     Pulse: 85   79   Resp: 18 18     Temp: 98.9 °F (37.2 °C) 97.7 °F (36.5 °C)     TempSrc: Oral Oral     SpO2:       Weight:   54.3 kg (119 lb 9.6 oz)    Height:            KPS        70%    Physical Exam   Constitutional: She is oriented to person, place, and time. She appears well-developed and well-nourished. No distress.   HENT:   Head: Normocephalic and atraumatic.   Eyes: Pupils are equal, round, and reactive to light. Conjunctivae are normal.   Neck: Normal range of motion. Neck supple.   Cardiovascular: Normal rate and regular rhythm.   No murmur heard.  Pulmonary/Chest: Effort normal and breath sounds normal. No respiratory distress.   Abdominal: Soft. Bowel sounds are normal. She exhibits no distension and no mass. There is no tenderness.   Musculoskeletal: Normal range of motion. She exhibits no edema.   Lymphadenopathy:     She has no cervical adenopathy.        Right: No supraclavicular adenopathy present.        Left: No supraclavicular adenopathy present.   Neurological: She is alert and oriented to person, place, and time.   Skin: Skin is warm and dry.   Psychiatric: She has a normal mood and affect. Her behavior is normal. Judgment and thought content normal.   Nursing note and vitals reviewed.  Rectal: Deferred.       The following portions of the patient's history were reviewed and updated as appropriate: allergies, current medications, past family history, past medical history, past  social history, past surgical history and problem list.    IMAGING:  I have reviewed the following imaging studies:    MRI abdomen/pelvis 4/3/2020:  IMPRESSION:  1. Abnormal wall thickening identified of the rectum and malignancy  cannot be excluded. Correlation to colonoscopy is recommended for  further evaluation. Adenopathy identified throughout the  retroperitoneum. Findings concerning for metastatic disease.  3. Negative for pneumonia at the lung bases.  4. Moderate dilatation seen of the right renal collecting system to the  UVJ where there is no evidence of obstruction or obstructing lesion.  Contrast seen to the bladder.    CT chest 4/6/2020:  IMPRESSION:  Area of likely nodular scarring posteriorly within the left  upper lobe measuring 8 mm along with a subcentimeter focus 4 mm in size  in the right lower lobe superior portion of indeterminate significance  may represent postinflammatory findings or nodular scarring with  moderate degree of respiratory motion somewhat limiting evaluation.  Attention at these sites on followup to exclude for pulmonary nodularity  and to ensure stability.    PATHOLOGY:  Rectal Mass 4/5/2020:  Gross Description    Received in formalin labeled as rectal mass biopsy consists of multiple tan soft tissue fragments aggregating 1.0 x 1.0 x 0.3 cm, submitted in toto in a single cassette.  HBM/klb    Microscopic Description    Sections of the rectal mass biopsy shows moderate to poorly differentiated squamous carcinoma which focally shows keratin marlen formation.  None of the available biopsy fragments are possible to assess the depth of the invading tumor.         Assessment      IMPRESSION: Mrs. Walker is an 80 y.o. 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  She is currently transitioning into hospice care with plans to return to her nursing home today.  She has been made DNR.  She has been seen by medical oncology, who did not feel that she would  be a good candidate for chemotherapy.  Today the patient, her daughter, and I discussed palliative radiotherapy to the rectal mass.  I explained that the goals of treatment were palliative with the intention of slowing/stopping the bleeding, but unfortunately the outcome would not be curative.  We discussed that this would be arranged in 5 daily treatments.  Risks and benefits of radiotherapy were discussed.  The patient and her daughter verbalized understanding, but agreed that at this time, they did not wish to seek any further treatment.  They were not interested in undergoing radiation.  Mrs. Walker would like to return to the [facility] with hospice care and pursue comfort measures only.    RECOMMENDATIONS:   Mrs. Walker will remain under the care of hospice.  Radiation Oncology will sign off.  Please let us know if we can offer any assistance in the care of this pleasant patient.      Emeterio Velazquez, GABRIELLE

## 2020-04-09 ENCOUNTER — READMISSION MANAGEMENT (OUTPATIENT)
Dept: CALL CENTER | Facility: HOSPITAL | Age: 80
End: 2020-04-09

## 2020-04-09 NOTE — OUTREACH NOTE
Prep Survey      Responses   [facility] facility patient discharged from?  [facility]   Is LACE score < 7 ?  No   Eligibility  Not Eligible   What are the reasons patient is not eligible?  Hospice/Pallative Care   Does the patient have one of the following disease processes/diagnoses(primary or secondary)?  Other   Prep survey completed?  Yes          Dianna Murphy RN

## 2020-04-22 ENCOUNTER — TELEPHONE (OUTPATIENT)
Dept: PALLIATIVE CARE | Facility: CLINIC | Age: 80
End: 2020-04-22

## 2020-04-22 NOTE — TELEPHONE ENCOUNTER
"Pt has a new pt apt tomorrow. After reviewing pt chart, it appeared pt went to SNF with Hospice care and would not be needing our services.  However, after reviewing Hospice records, pt was never admitted.  Contacted daughter to see if pt was still in need of services. Explained services and the differences in Hospice vs Palliative, and offered HOME/SNF Palliative from [facility].  Dtr said her mom was in the \"[facility]\" at [facility] and so at this time they did not anything from us.  Explained that should things change to please contact [facility] and we would be more than happy to help. Dtr v/u   "

## 2023-06-21 NOTE — PROGRESS NOTES
"Daily Progress Note    Patient: [facility] Day: 5    Subjective: She does have some sensation of need to void, and has voided small amounts.    Objective:     Visit Vitals   • /50   • Pulse 65   • Temp 98.1 °F (36.7 °C)   • Resp 16   • Ht 57.01\" (144.8 cm)   • Wt 130 lb (59 kg)   • LMP  (LMP Unknown)   • SpO2 93%   • BMI 28.12 kg/m2         Intake/Output Summary (Last 24 hours) at 03/10/17 0709  Last data filed at 03/10/17 0400   Gross per 24 hour   Intake    645 ml   Output    750 ml   Net   -105 ml   I/O cath - 300 mls lastly      Physical Exam:   General Appearance: alert, appears stated age and cooperative  Head: normocephalic, without obvious abnormality and atraumatic  Lungs respirations regular  Extremities moves extremities well, no edema, no cyanosis and no redness          Assessment/Plan: Still retaining urine although beginning to void small amounts. Will add a cholinergic      Patient Active Problem List   Diagnosis   • Hypertension   • CVA (cerebral vascular accident)   • Carotid stenosis   • Constipation   • Cerebrovascular accident (CVA)   • Altered mental status, unspecified   • Hypertensive emergency   • Urinary retention   • UTI (urinary tract infection)             Jed Simmons MD - 3/10/2017, 7:09 AM      " [Abdomen Masses] : No abdominal masses [Abdomen Tenderness] : ~T No ~M abdominal tenderness [No HSM] : no hepatosplenomegaly [No Rash or Lesion] : No rash or lesion [Alert] : alert [Oriented to Person] : oriented to person [Oriented to Place] : oriented to place [Oriented to Time] : oriented to time [Calm] : calm [de-identified] : Soft [de-identified] : Examined over the commode: large rectal prolapse - approx 10 cm, reducible viable; ALEXIA patulous anus with poor squeeze pressure [de-identified] : Normal [de-identified] : Normal [de-identified] : Normal [de-identified] : Normal

## 2024-10-15 NOTE — PLAN OF CARE
----- Message from Josh MULLINS sent at 10/15/2024  9:35 AM CDT -----  Regarding: [facility]  [facility]    Reason for Message: questions about pain    Additional Information: pt would like to know what to do about pain in foot while waiting  for appt, out of pain meds.     Call Back Number: [phone]  Caller Relationship: Patient  Caller Name: GARTH DELGADO   Problem: Fall Risk (Adult)  Goal: Identify Related Risk Factors and Signs and Symptoms  Outcome: Ongoing (interventions implemented as appropriate)  Goal: Absence of Falls  Outcome: Ongoing (interventions implemented as appropriate)

## (undated) DEVICE — SUCTION CANISTER, 1000CC,SAFELINER: Brand: DEROYAL

## (undated) DEVICE — THE BITE BLOCK MAXI, LATEX FREE STRAP IS USED TO PROTECT THE ENDOSCOPE INSERTION TUBE FROM BEING BITTEN BY THE PATIENT.

## (undated) DEVICE — SOL IRR H2O BTL 1000ML STRL

## (undated) DEVICE — SINGLE-USE BIOPSY FORCEPS: Brand: RADIAL JAW 4

## (undated) DEVICE — KT ORCA ORCAPOD DISP STRL

## (undated) DEVICE — SYR LUERLOK 50ML

## (undated) DEVICE — SPNG VERSALON 4X4 4PLY NONSTRL LF BG/200

## (undated) DEVICE — INTRO ACCSR BLNT TP

## (undated) DEVICE — CONTN GRAD MEAS TRIANG 32OZ BLK

## (undated) DEVICE — HYBRID CO2 TUBING/CAP SET FOR OLYMPUS® SCOPES & CO2 SOURCE: Brand: ERBE

## (undated) DEVICE — TUBING, SUCTION, 1/4" X 10', STRAIGHT: Brand: MEDLINE

## (undated) DEVICE — LUBE GEL ENDOGLIDE 1.1OZ